# Patient Record
Sex: FEMALE | Race: OTHER | Employment: FULL TIME | URBAN - METROPOLITAN AREA
[De-identification: names, ages, dates, MRNs, and addresses within clinical notes are randomized per-mention and may not be internally consistent; named-entity substitution may affect disease eponyms.]

---

## 2017-06-29 ENCOUNTER — HOSPITAL ENCOUNTER (OUTPATIENT)
Age: 32
Setting detail: SPECIMEN
Discharge: HOME | End: 2017-06-29
Payer: COMMERCIAL

## 2017-06-29 ENCOUNTER — OFFICE VISIT (OUTPATIENT)
Dept: OBGYN CLINIC | Age: 32
End: 2017-06-29
Payer: COMMERCIAL

## 2017-06-29 VITALS
WEIGHT: 229 LBS | RESPIRATION RATE: 18 BRPM | HEART RATE: 75 BPM | SYSTOLIC BLOOD PRESSURE: 125 MMHG | DIASTOLIC BLOOD PRESSURE: 80 MMHG | BODY MASS INDEX: 38.11 KG/M2

## 2017-06-29 DIAGNOSIS — R35.0 FREQUENT URINATION: ICD-10-CM

## 2017-06-29 DIAGNOSIS — Z01.419 WELL WOMAN EXAM: Primary | ICD-10-CM

## 2017-06-29 DIAGNOSIS — K42.9 UMBILICAL HERNIA WITHOUT OBSTRUCTION AND WITHOUT GANGRENE: ICD-10-CM

## 2017-06-29 DIAGNOSIS — Z12.4 CERVICAL CANCER SCREENING: ICD-10-CM

## 2017-06-29 PROCEDURE — 99395 PREV VISIT EST AGE 18-39: CPT | Performed by: SPECIALIST

## 2017-06-29 RX ORDER — NITROFURANTOIN 25; 75 MG/1; MG/1
100 CAPSULE ORAL 2 TIMES DAILY
Qty: 20 CAPSULE | Refills: 0 | Status: SHIPPED | OUTPATIENT
Start: 2017-06-29 | End: 2017-07-09

## 2017-06-29 ASSESSMENT — ENCOUNTER SYMPTOMS
VOMITING: 0
CONSTIPATION: 0
NAUSEA: 0
DIARRHEA: 0
COUGH: 0
APNEA: 0
ABDOMINAL PAIN: 0
ABDOMINAL DISTENTION: 0
EYE PAIN: 0

## 2017-06-30 LAB
CHLAMYDIA BY THIN PREP: NEGATIVE
N. GONORRHOEAE DNA, THIN PREP: NEGATIVE

## 2017-07-05 LAB — CYTOLOGY REPORT: NORMAL

## 2017-10-10 ENCOUNTER — HOSPITAL ENCOUNTER (EMERGENCY)
Age: 32
Discharge: HOME | End: 2017-10-10
Attending: EMERGENCY MEDICINE
Payer: COMMERCIAL

## 2017-10-10 VITALS
HEART RATE: 73 BPM | SYSTOLIC BLOOD PRESSURE: 113 MMHG | WEIGHT: 227.31 LBS | BODY MASS INDEX: 37.87 KG/M2 | HEIGHT: 65 IN | TEMPERATURE: 97.7 F | DIASTOLIC BLOOD PRESSURE: 59 MMHG | OXYGEN SATURATION: 98 % | RESPIRATION RATE: 16 BRPM

## 2017-10-10 DIAGNOSIS — K08.89 PAIN, DENTAL: Primary | ICD-10-CM

## 2017-10-10 PROCEDURE — 99282 EMERGENCY DEPT VISIT SF MDM: CPT

## 2017-10-10 RX ORDER — PENICILLIN V POTASSIUM 500 MG/1
500 TABLET ORAL 4 TIMES DAILY
Qty: 40 TABLET | Refills: 0 | Status: SHIPPED | OUTPATIENT
Start: 2017-10-10 | End: 2017-10-20

## 2017-10-10 RX ORDER — IBUPROFEN 800 MG/1
800 TABLET ORAL EVERY 8 HOURS PRN
Qty: 25 TABLET | Refills: 0 | Status: SHIPPED | OUTPATIENT
Start: 2017-10-10 | End: 2019-08-15 | Stop reason: ALTCHOICE

## 2017-10-10 RX ORDER — TRAMADOL HYDROCHLORIDE 50 MG/1
50 TABLET ORAL EVERY 6 HOURS PRN
Qty: 15 TABLET | Refills: 0 | Status: SHIPPED | OUTPATIENT
Start: 2017-10-10 | End: 2017-10-20

## 2017-10-10 ASSESSMENT — PAIN DESCRIPTION - PAIN TYPE: TYPE: ACUTE PAIN

## 2017-10-10 ASSESSMENT — PAIN SCALES - GENERAL: PAINLEVEL_OUTOF10: 6

## 2017-10-10 ASSESSMENT — PAIN DESCRIPTION - LOCATION: LOCATION: MOUTH

## 2017-10-10 NOTE — ED PROVIDER NOTES
in nurses notes    Allergies:is allergic to morphine. PHYSICAL EXAM     INITIAL VITALS: BP (!) 113/59   Pulse 73   Temp 97.7 °F (36.5 °C) (Oral)   Resp 16   Ht 5' 5\" (1.651 m)   Wt 227 lb 5 oz (103.1 kg)   SpO2 98%   BMI 37.83 kg/m²   CONSTITUTIONAL: Vital signs reviewed, Alert and oriented X 3. HEAD: Atraumatic, Normocephalic. EYES: Eyes are normal to inspection, Pupils equal, round and reactive to light. NECK: Normal ROM, No jugular venous distention, No meningeal signs, Cervical spine nontender. MOUTH:  + dental pain at 29, with no signs of abscess formation or Bassem sign noted. No swelling involving the airway at all. No tongue elevation. No trismus. Lips are normal.  No tooth fractures. RESPIRATORY CHEST: No respiratory distress. ABDOMEN: Abdomen is nontender, No distension. UPPER EXTREMITY: Inspection normal, No cyanosis. NEURO: GCS is 15, Speech normal, Memory normal.   SKIN: Skin is warm, Skin is dry. PSYCHIATRIC: Oriented X 3, Normal affect. EMERGENCY DEPARTMENT COURSE:   Pain meds and antibiotic prescriptions. Pt provided with dental clinic list and instructed to call as soon as possible for an appointment. Instructed to return for worsening or any new symptoms including throat swelling, difficulty swallowing or breathing. Pt agrees. FINAL IMPRESSION:     1.  Pain, dental          DISPOSITION:  DISPOSITION Decision to Discharge      PATIENT REFERRED TO:  Kamla Dudley MD  [address] [address]  [phone]    Call in 1 day      [facility] ED  [place] [address]  [phone]    If symptoms worsen      DISCHARGE MEDICATIONS:  New Prescriptions    IBUPROFEN (ADVIL;MOTRIN) 800 MG TABLET    Take 1 tablet by mouth every 8 hours as needed for Pain    PENICILLIN V POTASSIUM (VEETID) 500 MG TABLET    Take 1 tablet by mouth 4 times daily for 10 days    TRAMADOL (ULTRAM) 50 MG TABLET    Take 1 tablet by mouth every 6 hours as needed for Pain       (Please note that portions of this note were completed with a voice recognition program.  Efforts were made to edit the dictations but occasionally words are mis-transcribed.)    Jonny Vázquez 124, PA-C  10/10/17 1248

## 2017-11-10 ENCOUNTER — HOSPITAL ENCOUNTER (EMERGENCY)
Age: 32
Discharge: HOME | End: 2017-11-10
Attending: EMERGENCY MEDICINE
Payer: COMMERCIAL

## 2017-11-10 VITALS
RESPIRATION RATE: 16 BRPM | HEART RATE: 73 BPM | HEIGHT: 65 IN | WEIGHT: 230 LBS | SYSTOLIC BLOOD PRESSURE: 109 MMHG | TEMPERATURE: 97.8 F | DIASTOLIC BLOOD PRESSURE: 67 MMHG | BODY MASS INDEX: 38.32 KG/M2 | OXYGEN SATURATION: 97 %

## 2017-11-10 DIAGNOSIS — Z20.5 EXPOSURE TO HEPATITIS: Primary | ICD-10-CM

## 2017-11-10 LAB
HAV IGM SER IA-ACNC: NONREACTIVE
HEPATITIS B CORE IGM ANTIBODY: NONREACTIVE
HEPATITIS B SURFACE ANTIGEN: NONREACTIVE
HEPATITIS C ANTIBODY: NONREACTIVE
HIV AG/AB: NONREACTIVE

## 2017-11-10 PROCEDURE — 87389 HIV-1 AG W/HIV-1&-2 AB AG IA: CPT

## 2017-11-10 PROCEDURE — 36415 COLL VENOUS BLD VENIPUNCTURE: CPT

## 2017-11-10 PROCEDURE — 99283 EMERGENCY DEPT VISIT LOW MDM: CPT

## 2017-11-10 PROCEDURE — 80074 ACUTE HEPATITIS PANEL: CPT

## 2017-11-10 NOTE — ED PROVIDER NOTES
headache, numbness or focal weakness. Skin:  Denies rash. Negative in 10 essential Systems except as mentioned above and in the HPI. PAST MEDICAL HISTORY    has a past medical history of Abnormal Pap smear; Blood type A+; Bone disease; Gestational diabetes; Menarche; Parity; and Postpartum depression. SURGICAL HISTORY      has a past surgical history that includes Leg Surgery (Right, 10/2012). CURRENT MEDICATIONS       Current Discharge Medication List      CONTINUE these medications which have NOT CHANGED    Details   ibuprofen (ADVIL;MOTRIN) 800 MG tablet Take 1 tablet by mouth every 8 hours as needed for Pain  Qty: 25 tablet, Refills: 0      Acetaminophen 650 MG TABS Take 650 mg by mouth every 6 hours as needed for Pain  Qty: 30 tablet, Refills: 1             ALLERGIES     is allergic to morphine. FAMILY HISTORY     indicated that the status of her mother is unknown. She indicated that the status of her maternal grandmother is unknown. She indicated that the status of her maternal grandfather is unknown.      family history includes Cancer in her maternal grandfather; Depression in her mother; Diabetes in her mother; Early Death in her maternal grandfather and maternal grandmother; Heart Disease in her mother; High Blood Pressure in her maternal grandfather, maternal grandmother, and mother; Kidney Disease in her mother; Liver Disease in her maternal grandmother and mother; Stroke in her mother; Thyroid Disease in her mother. SOCIAL HISTORY      reports that she has been smoking Cigarettes. She has a 11.00 pack-year smoking history. She has never used smokeless tobacco. She reports that she uses drugs, including Marijuana. She reports that she does not drink alcohol. PHYSICAL EXAM     INITIAL VITALS:  height is 5' 5\" (1.651 m) and weight is 230 lb (104.3 kg). Her oral temperature is 97.8 °F (36.6 °C). Her blood pressure is 109/67 and her pulse is 73.  Her respiration is 16 and SpO2: 97%   Weight: 230 lb (104.3 kg)   Height: 5' 5\" (1.651 m)         CRITICAL CARE:      CONSULTS:  None      PROCEDURES:      FINAL IMPRESSION      1.  Exposure to hepatitis            DISPOSITION/PLAN   DISPOSITION Decision to Discharge        PATIENT REFERRED TO:  Harmeet Duval MD  [place] [phone]    Schedule an appointment as soon as possible for a visit         DISCHARGE MEDICATIONS:  Current Discharge Medication List          (Please note that portions of this note were completed with a voice recognition program.  Efforts were made to edit the dictations but occasionally words are mis-transcribed.)    Marquis Yodit DO  Attending Emergency Physician          Marquis Yodit DO  11/10/17 1021

## 2019-04-25 ENCOUNTER — HOSPITAL ENCOUNTER (OUTPATIENT)
Age: 34
Setting detail: OBSERVATION
Discharge: HOME | End: 2019-04-26
Attending: SURGERY | Admitting: SURGERY
Payer: COMMERCIAL

## 2019-04-25 ENCOUNTER — ANESTHESIA EVENT (OUTPATIENT)
Dept: OPERATING ROOM | Age: 34
End: 2019-04-25
Payer: COMMERCIAL

## 2019-04-25 ENCOUNTER — ANESTHESIA (OUTPATIENT)
Dept: OPERATING ROOM | Age: 34
End: 2019-04-25
Payer: COMMERCIAL

## 2019-04-25 VITALS — TEMPERATURE: 96.4 F | DIASTOLIC BLOOD PRESSURE: 63 MMHG | SYSTOLIC BLOOD PRESSURE: 103 MMHG | OXYGEN SATURATION: 91 %

## 2019-04-25 DIAGNOSIS — G89.18 ACUTE POST-OPERATIVE PAIN: Primary | ICD-10-CM

## 2019-04-25 DIAGNOSIS — Z87.19 S/P HERNIA REPAIR: ICD-10-CM

## 2019-04-25 DIAGNOSIS — Z98.890 S/P HERNIA REPAIR: ICD-10-CM

## 2019-04-25 LAB
-: NORMAL
HCG, PREGNANCY URINE (POC): NEGATIVE

## 2019-04-25 PROCEDURE — 3700000001 HC ADD 15 MINUTES (ANESTHESIA): Performed by: SURGERY

## 2019-04-25 PROCEDURE — 6360000002 HC RX W HCPCS: Performed by: SURGERY

## 2019-04-25 PROCEDURE — C1781 MESH (IMPLANTABLE): HCPCS | Performed by: SURGERY

## 2019-04-25 PROCEDURE — 7100000000 HC PACU RECOVERY - FIRST 15 MIN: Performed by: SURGERY

## 2019-04-25 PROCEDURE — 7100000001 HC PACU RECOVERY - ADDTL 15 MIN: Performed by: SURGERY

## 2019-04-25 PROCEDURE — 6360000002 HC RX W HCPCS: Performed by: NURSE ANESTHETIST, CERTIFIED REGISTERED

## 2019-04-25 PROCEDURE — 2709999900 HC NON-CHARGEABLE SUPPLY: Performed by: SURGERY

## 2019-04-25 PROCEDURE — 3700000000 HC ANESTHESIA ATTENDED CARE: Performed by: SURGERY

## 2019-04-25 PROCEDURE — 88302 TISSUE EXAM BY PATHOLOGIST: CPT

## 2019-04-25 PROCEDURE — 2580000003 HC RX 258: Performed by: ANESTHESIOLOGY

## 2019-04-25 PROCEDURE — 6370000000 HC RX 637 (ALT 250 FOR IP): Performed by: ANESTHESIOLOGY

## 2019-04-25 PROCEDURE — 6360000002 HC RX W HCPCS: Performed by: ANESTHESIOLOGY

## 2019-04-25 PROCEDURE — 3600000002 HC SURGERY LEVEL 2 BASE: Performed by: SURGERY

## 2019-04-25 PROCEDURE — 6370000000 HC RX 637 (ALT 250 FOR IP): Performed by: SURGERY

## 2019-04-25 PROCEDURE — 2500000003 HC RX 250 WO HCPCS: Performed by: SURGERY

## 2019-04-25 PROCEDURE — G0378 HOSPITAL OBSERVATION PER HR: HCPCS

## 2019-04-25 PROCEDURE — 3600000012 HC SURGERY LEVEL 2 ADDTL 15MIN: Performed by: SURGERY

## 2019-04-25 PROCEDURE — 2500000003 HC RX 250 WO HCPCS: Performed by: NURSE ANESTHETIST, CERTIFIED REGISTERED

## 2019-04-25 PROCEDURE — 84703 CHORIONIC GONADOTROPIN ASSAY: CPT

## 2019-04-25 DEVICE — PATCH HERN M DIA2.5IN CIR W/ STRP SEPRA TECHNOLOGY ABSRB: Type: IMPLANTABLE DEVICE | Site: UMBILICAL | Status: FUNCTIONAL

## 2019-04-25 RX ORDER — SODIUM CHLORIDE 0.9 % (FLUSH) 0.9 %
10 SYRINGE (ML) INJECTION PRN
Status: DISCONTINUED | OUTPATIENT
Start: 2019-04-25 | End: 2019-04-26 | Stop reason: HOSPADM

## 2019-04-25 RX ORDER — NEOSTIGMINE METHYLSULFATE 5 MG/5 ML
SYRINGE (ML) INTRAVENOUS PRN
Status: DISCONTINUED | OUTPATIENT
Start: 2019-04-25 | End: 2019-04-25 | Stop reason: SDUPTHER

## 2019-04-25 RX ORDER — DOCUSATE SODIUM 100 MG/1
100 CAPSULE, LIQUID FILLED ORAL 2 TIMES DAILY
Qty: 30 CAPSULE | Refills: 2 | Status: SHIPPED | OUTPATIENT
Start: 2019-04-25 | End: 2019-08-15 | Stop reason: ALTCHOICE

## 2019-04-25 RX ORDER — SODIUM CHLORIDE, SODIUM LACTATE, POTASSIUM CHLORIDE, CALCIUM CHLORIDE 600; 310; 30; 20 MG/100ML; MG/100ML; MG/100ML; MG/100ML
INJECTION, SOLUTION INTRAVENOUS CONTINUOUS
Status: DISCONTINUED | OUTPATIENT
Start: 2019-04-25 | End: 2019-04-25

## 2019-04-25 RX ORDER — M-VIT,TX,IRON,MINS/CALC/FOLIC 27MG-0.4MG
1 TABLET ORAL DAILY
COMMUNITY
End: 2020-09-17

## 2019-04-25 RX ORDER — SENNA PLUS 8.6 MG/1
1 TABLET ORAL DAILY PRN
Status: DISCONTINUED | OUTPATIENT
Start: 2019-04-25 | End: 2019-04-26 | Stop reason: HOSPADM

## 2019-04-25 RX ORDER — OXYCODONE HYDROCHLORIDE AND ACETAMINOPHEN 5; 325 MG/1; MG/1
1 TABLET ORAL EVERY 4 HOURS PRN
Status: DISCONTINUED | OUTPATIENT
Start: 2019-04-25 | End: 2019-04-26 | Stop reason: HOSPADM

## 2019-04-25 RX ORDER — GLYCOPYRROLATE 1 MG/5 ML
SYRINGE (ML) INTRAVENOUS PRN
Status: DISCONTINUED | OUTPATIENT
Start: 2019-04-25 | End: 2019-04-25 | Stop reason: SDUPTHER

## 2019-04-25 RX ORDER — PROMETHAZINE HYDROCHLORIDE 25 MG/ML
6.25 INJECTION, SOLUTION INTRAMUSCULAR; INTRAVENOUS
Status: DISCONTINUED | OUTPATIENT
Start: 2019-04-25 | End: 2019-04-25 | Stop reason: CLARIF

## 2019-04-25 RX ORDER — OXYCODONE HYDROCHLORIDE AND ACETAMINOPHEN 5; 325 MG/1; MG/1
2 TABLET ORAL EVERY 4 HOURS PRN
Status: DISCONTINUED | OUTPATIENT
Start: 2019-04-25 | End: 2019-04-26 | Stop reason: HOSPADM

## 2019-04-25 RX ORDER — MEPERIDINE HYDROCHLORIDE 50 MG/ML
12.5 INJECTION INTRAMUSCULAR; INTRAVENOUS; SUBCUTANEOUS EVERY 5 MIN PRN
Status: DISCONTINUED | OUTPATIENT
Start: 2019-04-25 | End: 2019-04-25 | Stop reason: HOSPADM

## 2019-04-25 RX ORDER — ROCURONIUM BROMIDE 10 MG/ML
INJECTION, SOLUTION INTRAVENOUS PRN
Status: DISCONTINUED | OUTPATIENT
Start: 2019-04-25 | End: 2019-04-25 | Stop reason: SDUPTHER

## 2019-04-25 RX ORDER — MIDAZOLAM HYDROCHLORIDE 1 MG/ML
INJECTION INTRAMUSCULAR; INTRAVENOUS PRN
Status: DISCONTINUED | OUTPATIENT
Start: 2019-04-25 | End: 2019-04-25 | Stop reason: SDUPTHER

## 2019-04-25 RX ORDER — OXYCODONE HYDROCHLORIDE AND ACETAMINOPHEN 5; 325 MG/1; MG/1
2 TABLET ORAL PRN
Status: DISCONTINUED | OUTPATIENT
Start: 2019-04-25 | End: 2019-04-25 | Stop reason: HOSPADM

## 2019-04-25 RX ORDER — SODIUM CHLORIDE 0.9 % (FLUSH) 0.9 %
10 SYRINGE (ML) INJECTION EVERY 12 HOURS SCHEDULED
Status: DISCONTINUED | OUTPATIENT
Start: 2019-04-25 | End: 2019-04-26 | Stop reason: HOSPADM

## 2019-04-25 RX ORDER — LABETALOL 20 MG/4 ML (5 MG/ML) INTRAVENOUS SYRINGE
5 EVERY 10 MIN PRN
Status: DISCONTINUED | OUTPATIENT
Start: 2019-04-25 | End: 2019-04-25 | Stop reason: HOSPADM

## 2019-04-25 RX ORDER — DIPHENHYDRAMINE HYDROCHLORIDE 50 MG/ML
12.5 INJECTION INTRAMUSCULAR; INTRAVENOUS
Status: DISCONTINUED | OUTPATIENT
Start: 2019-04-25 | End: 2019-04-25 | Stop reason: HOSPADM

## 2019-04-25 RX ORDER — CEFAZOLIN SODIUM 1 G/3ML
INJECTION, POWDER, FOR SOLUTION INTRAMUSCULAR; INTRAVENOUS PRN
Status: DISCONTINUED | OUTPATIENT
Start: 2019-04-25 | End: 2019-04-25 | Stop reason: SDUPTHER

## 2019-04-25 RX ORDER — SCOLOPAMINE TRANSDERMAL SYSTEM 1 MG/1
1 PATCH, EXTENDED RELEASE TRANSDERMAL
Status: DISCONTINUED | OUTPATIENT
Start: 2019-04-25 | End: 2019-04-26 | Stop reason: HOSPADM

## 2019-04-25 RX ORDER — DEXAMETHASONE SODIUM PHOSPHATE 4 MG/ML
INJECTION, SOLUTION INTRA-ARTICULAR; INTRALESIONAL; INTRAMUSCULAR; INTRAVENOUS; SOFT TISSUE PRN
Status: DISCONTINUED | OUTPATIENT
Start: 2019-04-25 | End: 2019-04-25 | Stop reason: SDUPTHER

## 2019-04-25 RX ORDER — BUPIVACAINE HYDROCHLORIDE 5 MG/ML
INJECTION, SOLUTION EPIDURAL; INTRACAUDAL PRN
Status: DISCONTINUED | OUTPATIENT
Start: 2019-04-25 | End: 2019-04-25 | Stop reason: ALTCHOICE

## 2019-04-25 RX ORDER — ONDANSETRON 2 MG/ML
INJECTION INTRAMUSCULAR; INTRAVENOUS PRN
Status: DISCONTINUED | OUTPATIENT
Start: 2019-04-25 | End: 2019-04-25 | Stop reason: SDUPTHER

## 2019-04-25 RX ORDER — ONDANSETRON 2 MG/ML
4 INJECTION INTRAMUSCULAR; INTRAVENOUS EVERY 6 HOURS PRN
Status: DISCONTINUED | OUTPATIENT
Start: 2019-04-25 | End: 2019-04-26 | Stop reason: HOSPADM

## 2019-04-25 RX ORDER — PROPOFOL 10 MG/ML
INJECTION, EMULSION INTRAVENOUS PRN
Status: DISCONTINUED | OUTPATIENT
Start: 2019-04-25 | End: 2019-04-25 | Stop reason: SDUPTHER

## 2019-04-25 RX ORDER — FAMOTIDINE 20 MG/1
20 TABLET, FILM COATED ORAL 2 TIMES DAILY
Status: DISCONTINUED | OUTPATIENT
Start: 2019-04-25 | End: 2019-04-26 | Stop reason: HOSPADM

## 2019-04-25 RX ORDER — OXYCODONE HYDROCHLORIDE AND ACETAMINOPHEN 5; 325 MG/1; MG/1
1 TABLET ORAL EVERY 6 HOURS PRN
Qty: 28 TABLET | Refills: 0 | Status: SHIPPED | OUTPATIENT
Start: 2019-04-25 | End: 2019-05-02

## 2019-04-25 RX ORDER — OXYCODONE HYDROCHLORIDE AND ACETAMINOPHEN 5; 325 MG/1; MG/1
1 TABLET ORAL PRN
Status: DISCONTINUED | OUTPATIENT
Start: 2019-04-25 | End: 2019-04-25 | Stop reason: HOSPADM

## 2019-04-25 RX ORDER — FENTANYL CITRATE 50 UG/ML
INJECTION, SOLUTION INTRAMUSCULAR; INTRAVENOUS PRN
Status: DISCONTINUED | OUTPATIENT
Start: 2019-04-25 | End: 2019-04-25 | Stop reason: SDUPTHER

## 2019-04-25 RX ORDER — KETOROLAC TROMETHAMINE 30 MG/ML
INJECTION, SOLUTION INTRAMUSCULAR; INTRAVENOUS PRN
Status: DISCONTINUED | OUTPATIENT
Start: 2019-04-25 | End: 2019-04-25 | Stop reason: SDUPTHER

## 2019-04-25 RX ORDER — DOCUSATE SODIUM 100 MG/1
100 CAPSULE, LIQUID FILLED ORAL 2 TIMES DAILY
Status: DISCONTINUED | OUTPATIENT
Start: 2019-04-25 | End: 2019-04-26 | Stop reason: HOSPADM

## 2019-04-25 RX ORDER — LIDOCAINE HYDROCHLORIDE 10 MG/ML
INJECTION, SOLUTION EPIDURAL; INFILTRATION; INTRACAUDAL; PERINEURAL PRN
Status: DISCONTINUED | OUTPATIENT
Start: 2019-04-25 | End: 2019-04-25 | Stop reason: SDUPTHER

## 2019-04-25 RX ADMIN — Medication 0.4 MG: at 13:09

## 2019-04-25 RX ADMIN — ROCURONIUM BROMIDE 10 MG: 10 INJECTION INTRAVENOUS at 12:30

## 2019-04-25 RX ADMIN — ROCURONIUM BROMIDE 30 MG: 10 INJECTION INTRAVENOUS at 12:06

## 2019-04-25 RX ADMIN — SODIUM CHLORIDE, POTASSIUM CHLORIDE, SODIUM LACTATE AND CALCIUM CHLORIDE: 600; 310; 30; 20 INJECTION, SOLUTION INTRAVENOUS at 09:35

## 2019-04-25 RX ADMIN — KETOROLAC TROMETHAMINE 30 MG: 30 INJECTION, SOLUTION INTRAMUSCULAR at 12:52

## 2019-04-25 RX ADMIN — HYDROMORPHONE HYDROCHLORIDE 0.5 MG: 1 INJECTION, SOLUTION INTRAMUSCULAR; INTRAVENOUS; SUBCUTANEOUS at 13:42

## 2019-04-25 RX ADMIN — OXYCODONE HYDROCHLORIDE AND ACETAMINOPHEN 2 TABLET: 5; 325 TABLET ORAL at 23:12

## 2019-04-25 RX ADMIN — LIDOCAINE HYDROCHLORIDE 50 MG: 10 INJECTION, SOLUTION EPIDURAL; INFILTRATION; INTRACAUDAL; PERINEURAL at 12:05

## 2019-04-25 RX ADMIN — Medication 3 MG: at 13:09

## 2019-04-25 RX ADMIN — OXYCODONE HYDROCHLORIDE AND ACETAMINOPHEN 2 TABLET: 5; 325 TABLET ORAL at 19:03

## 2019-04-25 RX ADMIN — ONDANSETRON 4 MG: 2 INJECTION INTRAMUSCULAR; INTRAVENOUS at 12:52

## 2019-04-25 RX ADMIN — HYDROMORPHONE HYDROCHLORIDE 0.5 MG: 1 INJECTION, SOLUTION INTRAMUSCULAR; INTRAVENOUS; SUBCUTANEOUS at 14:20

## 2019-04-25 RX ADMIN — FAMOTIDINE 20 MG: 20 TABLET ORAL at 20:42

## 2019-04-25 RX ADMIN — MIDAZOLAM 2 MG: 1 INJECTION INTRAMUSCULAR; INTRAVENOUS at 12:02

## 2019-04-25 RX ADMIN — ROCURONIUM BROMIDE 10 MG: 10 INJECTION INTRAVENOUS at 12:14

## 2019-04-25 RX ADMIN — DEXAMETHASONE SODIUM PHOSPHATE 10 MG: 4 INJECTION, SOLUTION INTRA-ARTICULAR; INTRALESIONAL; INTRAMUSCULAR; INTRAVENOUS; SOFT TISSUE at 12:08

## 2019-04-25 RX ADMIN — HYDROMORPHONE HYDROCHLORIDE 0.5 MG: 1 INJECTION, SOLUTION INTRAMUSCULAR; INTRAVENOUS; SUBCUTANEOUS at 13:52

## 2019-04-25 RX ADMIN — CEFAZOLIN 2000 MG: 1 INJECTION, POWDER, FOR SOLUTION INTRAMUSCULAR; INTRAVENOUS at 12:08

## 2019-04-25 RX ADMIN — FENTANYL CITRATE 100 MCG: 50 INJECTION, SOLUTION INTRAMUSCULAR; INTRAVENOUS at 12:05

## 2019-04-25 RX ADMIN — PROPOFOL 200 MG: 10 INJECTION, EMULSION INTRAVENOUS at 12:05

## 2019-04-25 RX ADMIN — SODIUM CHLORIDE, POTASSIUM CHLORIDE, SODIUM LACTATE AND CALCIUM CHLORIDE: 600; 310; 30; 20 INJECTION, SOLUTION INTRAVENOUS at 13:01

## 2019-04-25 RX ADMIN — OXYCODONE HYDROCHLORIDE AND ACETAMINOPHEN 2 TABLET: 5; 325 TABLET ORAL at 15:12

## 2019-04-25 RX ADMIN — DOCUSATE SODIUM 100 MG: 100 CAPSULE, LIQUID FILLED ORAL at 20:42

## 2019-04-25 RX ADMIN — Medication 2 G: at 20:43

## 2019-04-25 RX ADMIN — Medication 2 G: at 12:08

## 2019-04-25 ASSESSMENT — PAIN DESCRIPTION - LOCATION
LOCATION: ARM
LOCATION: ABDOMEN

## 2019-04-25 ASSESSMENT — PULMONARY FUNCTION TESTS
PIF_VALUE: 23
PIF_VALUE: 3
PIF_VALUE: 24
PIF_VALUE: 21
PIF_VALUE: 20
PIF_VALUE: 23
PIF_VALUE: 23
PIF_VALUE: 26
PIF_VALUE: 23
PIF_VALUE: 22
PIF_VALUE: 23
PIF_VALUE: 22
PIF_VALUE: 24
PIF_VALUE: 18
PIF_VALUE: 22
PIF_VALUE: 24
PIF_VALUE: 24
PIF_VALUE: 22
PIF_VALUE: 23
PIF_VALUE: 19
PIF_VALUE: 23
PIF_VALUE: 24
PIF_VALUE: 1
PIF_VALUE: 22
PIF_VALUE: 26
PIF_VALUE: 25
PIF_VALUE: 22
PIF_VALUE: 23
PIF_VALUE: 24
PIF_VALUE: 2
PIF_VALUE: 21
PIF_VALUE: 23
PIF_VALUE: 19
PIF_VALUE: 19
PIF_VALUE: 22
PIF_VALUE: 23
PIF_VALUE: 24
PIF_VALUE: 19
PIF_VALUE: 24
PIF_VALUE: 23
PIF_VALUE: 1
PIF_VALUE: 25
PIF_VALUE: 2
PIF_VALUE: 19
PIF_VALUE: 21
PIF_VALUE: 23
PIF_VALUE: 1
PIF_VALUE: 4
PIF_VALUE: 22
PIF_VALUE: 24
PIF_VALUE: 23
PIF_VALUE: 19
PIF_VALUE: 3
PIF_VALUE: 21
PIF_VALUE: 23
PIF_VALUE: 22
PIF_VALUE: 23
PIF_VALUE: 19
PIF_VALUE: 23
PIF_VALUE: 22
PIF_VALUE: 23
PIF_VALUE: 27
PIF_VALUE: 21
PIF_VALUE: 21
PIF_VALUE: 22
PIF_VALUE: 24
PIF_VALUE: 21
PIF_VALUE: 23
PIF_VALUE: 3
PIF_VALUE: 20
PIF_VALUE: 25
PIF_VALUE: 23

## 2019-04-25 ASSESSMENT — PAIN SCALES - GENERAL
PAINLEVEL_OUTOF10: 8
PAINLEVEL_OUTOF10: 7
PAINLEVEL_OUTOF10: 0
PAINLEVEL_OUTOF10: 0
PAINLEVEL_OUTOF10: 8
PAINLEVEL_OUTOF10: 9
PAINLEVEL_OUTOF10: 4
PAINLEVEL_OUTOF10: 9
PAINLEVEL_OUTOF10: 8

## 2019-04-25 ASSESSMENT — PAIN DESCRIPTION - PAIN TYPE
TYPE: SURGICAL PAIN

## 2019-04-25 ASSESSMENT — PAIN - FUNCTIONAL ASSESSMENT: PAIN_FUNCTIONAL_ASSESSMENT: 0-10

## 2019-04-25 ASSESSMENT — LIFESTYLE VARIABLES: SMOKING_STATUS: 1

## 2019-04-25 NOTE — ANESTHESIA PRE PROCEDURE
Department of Anesthesiology  Preprocedure Note       Name:  Davide Quach   Age:  29 y.o.  :  1985                                          MRN:  [id number]         Date:  2019      Surgeon: Fuentes Arceo):  Manuela Ricardo MD    Procedure: HERNIA UMBILICAL REPAIR W/MESH (N/A )    Medications prior to admission:   Prior to Admission medications    Medication Sig Start Date End Date Taking? Authorizing Provider   ibuprofen (ADVIL;MOTRIN) 800 MG tablet Take 1 tablet by mouth every 8 hours as needed for Pain 10/10/17   Rachel Voss PA-C   Acetaminophen 650 MG TABS Take 650 mg by mouth every 6 hours as needed for Pain 9/1/15   Effie Muhmamad DO       Current medications:    Current Facility-Administered Medications   Medication Dose Route Frequency Provider Last Rate Last Dose    lactated ringers infusion   Intravenous Continuous Arvin Soliman MD           Allergies:     Allergies   Allergen Reactions    Morphine Other (See Comments)     Muscle spasms in eyes - bilateral       Problem List:    Patient Active Problem List   Diagnosis Code    Right hip pain M25.551    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    Amenorrhea N91.2    Encounter for supervision of other normal pregnancy J22.27    Umbilical hernia Q71.4    Morbid obesity with BMI of 40.0-44.9, adult ([facility] E66.01, Z68.41     , M apg , wt 8#8 O80       Past Medical History:        Diagnosis Date    Abnormal Pap smear     LSIL    Blood type A+     Bone disease     fibrousdysplasia    Gestational diabetes     2 out of 3 preg== 1st-diet control----2nd-insulin/diet    Menarche @12y/o    Parity       --  vag del -- ALVERTO 9/10/2013    Postpartum depression 2011    Dx pp depression, put on Prozac       Past Surgical History:        Procedure Laterality Date    LEG SURGERY Right 10/2012    right leg d/t fibrousdysplaia       Social History:    Social History     Tobacco Use    Smoking status: Current Every Day Smoker     Packs/day: 1.00     Years: 11.00     Pack years: 11.00     Types: Cigarettes    Smokeless tobacco: Never Used   Substance Use Topics    Alcohol use: No                                Ready to quit: Not Answered  Counseling given: Not Answered      Vital Signs (Current): There were no vitals filed for this visit. BP Readings from Last 3 Encounters:   11/10/17 109/67   10/10/17 (!) 113/59   06/29/17 125/80       NPO Status:                                                                                 BMI:   Wt Readings from Last 3 Encounters:   11/10/17 230 lb (104.3 kg)   10/10/17 227 lb 5 oz (103.1 kg)   06/29/17 229 lb (103.9 kg)     There is no height or weight on file to calculate BMI.    CBC:   Lab Results   Component Value Date    WBC 8.3 09/01/2015    RBC 3.96 09/01/2015    RBC 3.76 06/05/2012    HGB 12.4 09/02/2015    HCT 36.9 09/02/2015    MCV 95.9 09/01/2015    RDW 13.0 09/01/2015     09/01/2015     06/05/2012       CMP:   Lab Results   Component Value Date     10/05/2013    K 4.3 10/05/2013     10/05/2013    CO2 24 10/05/2013    BUN 12 10/05/2013    CREATININE 0.56 10/05/2013    GFRAA >60 10/05/2013    LABGLOM >60 10/05/2013    GLUCOSE 78 06/18/2015    GLUCOSE 88 10/05/2013    GLUCOSE 76 01/11/2012    CALCIUM 8.6 06/23/2012    BILITOT 0.34 06/23/2012    ALKPHOS 95 06/23/2012    AST 21 06/23/2012    ALT 23 06/23/2012       POC Tests: No results for input(s): POCGLU, POCNA, POCK, POCCL, POCBUN, POCHEMO, POCHCT in the last 72 hours.     Coags: No results found for: PROTIME, INR, APTT    HCG (If Applicable):   Lab Results   Component Value Date    PREGTESTUR POSITIVE (A) 12/24/2014    HCG NEGATIVE 10/22/2012    HCGQUANT 39,224 (H) 01/24/2013        ABGs: No results found for: PHART, PO2ART, RKM8WLQ, WTD3NBE, BEART, F1ENQUOO     Type & Screen (If Applicable):  No results found for: [facility]    Anesthesia Evaluation  Patient summary reviewed and Nursing notes reviewed no history of anesthetic complications:   Airway: Mallampati: I  TM distance: >3 FB   Neck ROM: full  Mouth opening: > = 3 FB Dental: normal exam         Pulmonary:normal exam    (+) current smoker                           Cardiovascular:Negative CV ROS                      Neuro/Psych:   Negative Neuro/Psych ROS              GI/Hepatic/Renal:   (+) morbid obesity          Endo/Other: Negative Endo/Other ROS                    Abdominal:           Vascular:                                        Anesthesia Plan      general     ASA 2       Induction: intravenous. MIPS: Postoperative opioids intended and Prophylactic antiemetics administered. Anesthetic plan and risks discussed with patient. Plan discussed with CRNA.                   Hemant Londono MD   4/25/2019

## 2019-04-25 NOTE — H&P
HISTORY and [address]47         NAME:  Jacinta Hinkle  MRN: [id number]   YOB: 1985   Date: 2019   Age: 29 y.o. Gender: female       COMPLAINT AND PRESENT HISTORY:      Fausto Caceres is a 29 yr old female who is having an umbilical hernia repaired with mesh. She has been having pain in the area., Hernia has been present for about 3-4 yrs since she had a baby, Pain increased over time, She has no change in bowel habits or blood in stool. She says she has good health Denies chronic diseases. Eating and sleeping well, No recent illnesses    DIAGNOSTIC RESULTS   Radiology:     EKG:   Labs:    U/A:      PAST MEDICAL HISTORY     Past Medical History:   Diagnosis Date    Abnormal Pap smear     LSIL    Blood type A+     Bone disease     fibrousdysplasia    Gestational diabetes     2 out of 3 preg== 1st-diet control----2nd-insulin/diet    Menarche @12y/o    Parity       --  vag del -- ALVERTO 9/10/2013    Postpartum depression 2011    Dx pp depression, put on Prozac       Pt denies any history of Diabetes mellitus type 2, stroke, heart disease, COPD, Asthma, GERD, HLD, Cancer, Seizures,Thyroid disease, Kidney Disease, Hepatitis, TB, Psychiatric Disorders or Substance abuse.     SURGICAL HISTORY       Past Surgical History:   Procedure Laterality Date    DILATION AND CURETTAGE OF UTERUS      LEG SURGERY Right 10/2012    right leg d/t fibrousdysplaia       FAMILY HISTORY       Family History   Problem Relation Age of Onset    Diabetes Mother     High Blood Pressure Mother     Heart Disease Mother         stents    Kidney Disease Mother         d/t diabetes    Depression Mother     Stroke Mother     Liver Disease Mother     Thyroid Disease Mother     Early Death Maternal Grandmother     High Blood Pressure Maternal Grandmother     Liver Disease Maternal Grandmother     Early Death Maternal Grandfather     High Blood Pressure Maternal Grandfather  Cancer Maternal Grandfather         colon       SOCIAL HISTORY       Social History     Socioeconomic History    Marital status: Single     Spouse name: None    Number of children: None    Years of education: None    Highest education level: None   Occupational History    None   Social Needs    Financial resource strain: None    Food insecurity:     Worry: None     Inability: None    Transportation needs:     Medical: None     Non-medical: None   Tobacco Use    Smoking status: Current Every Day Smoker     Packs/day: 1.00     Years: 11.00     Pack years: 11.00     Types: Cigarettes    Smokeless tobacco: Never Used   Substance and Sexual Activity    Alcohol use: No    Drug use: No     Types: Marijuana     Comment: History of    Sexual activity: Yes     Partners: Male   Lifestyle    Physical activity:     Days per week: None     Minutes per session: None    Stress: None   Relationships    Social connections:     Talks on phone: None     Gets together: None     Attends Caodaism service: None     Active member of club or organization: None     Attends meetings of clubs or organizations: None     Relationship status: None    Intimate partner violence:     Fear of current or ex partner: None     Emotionally abused: None     Physically abused: None     Forced sexual activity: None   Other Topics Concern    None   Social History Narrative    None           REVIEW OF SYSTEMS      Allergies   Allergen Reactions    Morphine Other (See Comments)     Muscle spasms in eyes - bilateral       No current facility-administered medications on file prior to encounter.       Current Outpatient Medications on File Prior to Encounter   Medication Sig Dispense Refill    Multiple Vitamins-Minerals (THERAPEUTIC MULTIVITAMIN-MINERALS) tablet Take 1 tablet by mouth daily      Acetaminophen 650 MG TABS Take 650 mg by mouth every 6 hours as needed for Pain 30 tablet 1    ibuprofen (ADVIL;MOTRIN) 800 MG tablet Take 1 tablet by mouth every 8 hours as needed for Pain 25 tablet 0                      General health:  Patient states health is overall very good, No fevers or infections recently, She feels well and has good energy                  Skin:  No  Skin lesions. .    Head, eyes, ears, nose, throat:  Does not get headaches, No sore throats or sinus problems,      Neck:  No pain, stiffness or masses. Cardiovascular/Respiratory system:  Has no chest pressure,   Denies CAD, Denies lung problems and no cough              Gastrointestinal tract: Has umbilical hernia and has pain on palpation  No change in bowel habits,  LMP 1 week  G 2 P 2  Hernia formed shortly after the birth of her last child in 2015    Genitourinary:  Currently has no UTI sx    Locomotor:  No bone or joint pains. No swelling or deformities. Neuropsychiatric:  No referable complaints. See HPI. Umbilical hernia     GENERAL PHYSICAL EXAM:         Vitals: BP 94/61   Pulse 62   Temp 97.3 °F (36.3 °C) (Oral)   Resp 18   Ht 5' 6\" (1.676 m)   Wt 230 lb (104.3 kg)   SpO2 97%   BMI 37.12 kg/m²  Body mass index is 37.12 kg/m². GENERAL APPEARANCE:  Dano Hunter is 29 y.o.,  female, moderately obese,  Physical Exam   Constitutional: She appears well-developed and well-nourished. HENT:   Head: Normocephalic. Right Ear: External ear normal.   Left Ear: External ear normal.   Nose: Nose normal.   Mouth/Throat: Oropharynx is clear and moist.   Eyes: Pupils are equal, round, and reactive to light. Neck: Normal range of motion. No JVD present. No thyromegaly present. Cardiovascular: Normal rate, normal heart sounds and intact distal pulses. No murmur heard. Pulmonary/Chest: Effort normal. She has no wheezes. Abdominal: Soft. Bowel sounds are normal.   Umbilical hernia is present. Approx 6 cm oval, Non tender to palpation. Slighly below and left of umbilicus   Musculoskeletal: Normal range of motion.  She exhibits no edema or deformity. Neurological: She is alert. She displays normal reflexes. No cranial nerve deficit. Skin: Skin is warm. Capillary refill takes less than 2 seconds. Psychiatric: She has a normal mood and affect. Her behavior is normal. Thought content normal.         PROVISIONAL DIAGNOSES:        Umbilical hernia   For repair with mesh  Active Problems:    * No active hospital problems. *  Resolved Problems:    * No resolved hospital problems.  JUSTIN Villanueva - CNP on 4/25/2019 at 9:39 AM

## 2019-04-25 NOTE — OP NOTE
OPERATIVE NOTE    DATE OF PROCEDURE: 4/25/2019     Jimbo Whelan MD    ASSISTANT: none    PREOPERATIVE DIAGNOSIS : large umbilical  hernia    POSTOPERATIVE DIAGNOSIS: Same    OPERATION: umbilical hernia repair with mesh    ANESTHESIA: General anesthesia    ESTIMATED BLOOD LOSS:  53MF    COMPLICATIONS: None. SPECIMENS:  Was Obtained: hernia sac    HISTORY: The patient is a 29y.o. year old female with history of above preop diagnosis. I explained the risk, benefits, expected outcome, and alternatives to the procedure. Patient understands and is in agreement. PROCEDURE: The patient was given general anesthesia. The abdomen was prepped and draped in typical sterile fashion. Incision is made in the midline at the umbilicus and dissection is taken down with electro bovie cautery to the hernia sac. The hernia sac is opened and removed and sent for specimen. The adhesions are taken down sharply. The ventralex ST with strap circular mesh medium is dipped in saline and inserted into the peritoneal space. The mesh is sutured into place with interrupted O-PDS suture. The fascia is closed over the mesh with 0-Vicryl in figure of 8 sutures. The thin umbilical skin is ischemic and had to be removed. The scarpas layer is closed with 3-0 vicryl in interrupted suture. The dermis is closed with 4-0 Vicryl in interrupted fashion, and the skin is closed with 4-0 Monocryl in running fashion. The patient surgical glue is applied to the skin and the patient is awakened and extubated and taken to the recovery room in stable condition.     Electronically signed by Rosalva Cole MD on 4/25/2019 at 1:12 PM

## 2019-04-25 NOTE — ANESTHESIA POSTPROCEDURE EVALUATION
Department of Anesthesiology  Postprocedure Note    Patient: Josseline Trejo  MRN: [id number]  YOB: 1985  Date of evaluation: 4/25/2019  Time:  3:08 PM     Procedure Summary     Date:  04/25/19 Room / Location:  [address] [place] [address]    Anesthesia Start:  1201 Anesthesia Stop:  6557    Procedure: HERNIA UMBILICAL REPAIR W/MESH (N/A Abdomen) Diagnosis:  (UMBILICAL HERNIA    PAT ON ADMIT PER ANES)    Surgeon:  Chika Morales MD Responsible Provider:  Tabitha Oneill MD    Anesthesia Type:  general ASA Status:  2          Anesthesia Type: general    Ambrocio Phase I: Ambrocio Score: 8    Ambrocio Phase II:      Last vitals: Reviewed and per EMR flowsheets.        Anesthesia Post Evaluation    Comments: POST- ANESTHESIA EVALUATION       Pt Name: Josseline Trejo  MRN: [id number]  [place]: 1985  Date of evaluation: 4/25/2019  Time:  3:08 PM      BP (!) 109/59   Pulse 58   Temp 98.5 °F (36.9 °C) (Oral)   Resp 20   Ht 5' 6\" (1.676 m)   Wt 230 lb (104.3 kg)   SpO2 99%   BMI 37.12 kg/m²      Consciousness Level  Awake  Cardiopulmonary Status  Stable  Pain Adequately Treated YES  Nausea / Vomiting  NO  Adequate Hydration  YES  Anesthesia Related Complications NONE      Electronically signed by Tabitha Oneill MD on 4/25/2019 at 3:08 PM

## 2019-04-25 NOTE — CARE COORDINATION
[address]  DVT Prophylaxis and Vaccine Status  Work List  Mandatory for all patients      Patient must be on both Chemical prophylaxis and Mechanical prophylaxis.  If chemical/mechanical prophylaxis is not ordered, the physician must document a reason for not using prophylaxis     Chemical Prophylaxis  Is patient on chemical prophylaxis: No  If no chemical prophylaxis Is a order in for No Chemical VTE prophylaxisYes  If no was the physician notified not applicable      Mechanical Prophylaxis  Is patient on mechanical prophylaxis, intermittent pneumatic compression device: Yes  If no was the physician notified not applicable        Pneumonia Vaccine  Vaccine indicated:  Not indicated  If indicated was the vaccine given: not applicable    Influenza Vaccine (applicable from October through March):  Vaccine indicated: Up-to-date  If indicated was the vaccine given: yes    Patient Education  Education completed on DVT prophylaxis: yes

## 2019-04-26 VITALS
DIASTOLIC BLOOD PRESSURE: 60 MMHG | TEMPERATURE: 98.1 F | WEIGHT: 230 LBS | HEART RATE: 58 BPM | OXYGEN SATURATION: 96 % | BODY MASS INDEX: 53.23 KG/M2 | RESPIRATION RATE: 20 BRPM | HEIGHT: 55 IN | SYSTOLIC BLOOD PRESSURE: 100 MMHG

## 2019-04-26 LAB
ANION GAP SERPL CALCULATED.3IONS-SCNC: 12 MMOL/L (ref 9–17)
BUN BLDV-MCNC: 8 MG/DL (ref 6–20)
BUN/CREAT BLD: ABNORMAL (ref 9–20)
CALCIUM SERPL-MCNC: 8.4 MG/DL (ref 8.6–10.4)
CHLORIDE BLD-SCNC: 104 MMOL/L (ref 98–107)
CO2: 22 MMOL/L (ref 20–31)
CREAT SERPL-MCNC: 0.49 MG/DL (ref 0.5–0.9)
GFR AFRICAN AMERICAN: >60 ML/MIN
GFR NON-AFRICAN AMERICAN: >60 ML/MIN
GFR SERPL CREATININE-BSD FRML MDRD: ABNORMAL ML/MIN/{1.73_M2}
GFR SERPL CREATININE-BSD FRML MDRD: ABNORMAL ML/MIN/{1.73_M2}
GLUCOSE BLD-MCNC: 142 MG/DL (ref 70–99)
HCT VFR BLD CALC: 39.3 % (ref 36–46)
HEMOGLOBIN: 13.2 G/DL (ref 12–16)
MCH RBC QN AUTO: 32.1 PG (ref 26–34)
MCHC RBC AUTO-ENTMCNC: 33.6 G/DL (ref 31–37)
MCV RBC AUTO: 95.5 FL (ref 80–100)
NRBC AUTOMATED: ABNORMAL PER 100 WBC
PDW BLD-RTO: 13.1 % (ref 11.5–14.9)
PLATELET # BLD: 229 K/UL (ref 150–450)
PMV BLD AUTO: 7.4 FL (ref 6–12)
POTASSIUM SERPL-SCNC: 4.1 MMOL/L (ref 3.7–5.3)
RBC # BLD: 4.11 M/UL (ref 4–5.2)
SODIUM BLD-SCNC: 138 MMOL/L (ref 135–144)
SURGICAL PATHOLOGY REPORT: NORMAL
WBC # BLD: 14.5 K/UL (ref 3.5–11)

## 2019-04-26 PROCEDURE — 85027 COMPLETE CBC AUTOMATED: CPT

## 2019-04-26 PROCEDURE — G0378 HOSPITAL OBSERVATION PER HR: HCPCS

## 2019-04-26 PROCEDURE — 36415 COLL VENOUS BLD VENIPUNCTURE: CPT

## 2019-04-26 PROCEDURE — 96372 THER/PROPH/DIAG INJ SC/IM: CPT

## 2019-04-26 PROCEDURE — 80048 BASIC METABOLIC PNL TOTAL CA: CPT

## 2019-04-26 PROCEDURE — 6360000002 HC RX W HCPCS: Performed by: SURGERY

## 2019-04-26 PROCEDURE — 6370000000 HC RX 637 (ALT 250 FOR IP): Performed by: SURGERY

## 2019-04-26 RX ADMIN — FAMOTIDINE 20 MG: 20 TABLET ORAL at 07:47

## 2019-04-26 RX ADMIN — Medication 2 G: at 03:24

## 2019-04-26 RX ADMIN — ENOXAPARIN SODIUM 40 MG: 100 INJECTION SUBCUTANEOUS at 07:47

## 2019-04-26 RX ADMIN — DOCUSATE SODIUM 100 MG: 100 CAPSULE, LIQUID FILLED ORAL at 07:47

## 2019-04-26 RX ADMIN — OXYCODONE HYDROCHLORIDE AND ACETAMINOPHEN 2 TABLET: 5; 325 TABLET ORAL at 03:24

## 2019-04-26 RX ADMIN — OXYCODONE HYDROCHLORIDE AND ACETAMINOPHEN 2 TABLET: 5; 325 TABLET ORAL at 07:47

## 2019-04-26 ASSESSMENT — PAIN SCALES - GENERAL
PAINLEVEL_OUTOF10: 0
PAINLEVEL_OUTOF10: 9
PAINLEVEL_OUTOF10: 8

## 2019-04-26 NOTE — PLAN OF CARE
Problem: Pain:  Description  Pain management should include both nonpharmacologic and pharmacologic interventions. Goal: Control of acute pain  Description  Control of acute pain  4/26/2019 1039 by Tyrone Hackett RN  Outcome: Met This Shift  Note:   Adequate pain control maintained this shift. Medicated per PRN orders. See MAR. Problem: Skin Integrity:  Goal: Will show no infection signs and symptoms  Description  Will show no infection signs and symptoms  4/26/2019 1039 by Tyrone Hackett RN  Outcome: Met This Shift  Note:   Patient remains afebrile this shift. Incision is clean, dry and intact. No redness or drainage noted.

## 2019-04-26 NOTE — ANESTHESIA POSTPROCEDURE EVALUATION
Department of Anesthesiology  Postprocedure Note    Patient: Dedra Hardy  MRN: [id number]  YOB: 1985  Date of evaluation: 4/26/2019  Time:  9:04 AM     Procedure Summary     Date:  04/25/19 Room / Location:  [address] [place] [address] / [address] [place] [address]    Anesthesia Start:  6508 Anesthesia Stop:  363    Procedure: HERNIA UMBILICAL REPAIR W/MESH (N/A Abdomen) Diagnosis:  (UMBILICAL HERNIA    PAT ON ADMIT PER ANES)    Surgeon:  Gladys Sanchez MD Responsible Provider:  Kyle Chaudhary MD    Anesthesia Type:  general ASA Status:  2          Anesthesia Type: general    Ambrocio Phase I: Ambrocio Score: 8    Ambrocio Phase II:      Last vitals: Reviewed and per EMR flowsheets. Anesthesia Post Evaluation    Comments: Patient about to go home voices no complaints.

## 2019-04-26 NOTE — PLAN OF CARE
Problem: Pain:  Description  Pain management should include both nonpharmacologic and pharmacologic interventions. Goal: Pain level will decrease  Description  Pain level will decrease  4/26/2019 1113 by Мария Soni RN  Outcome: Completed     Problem: Pain:  Description  Pain management should include both nonpharmacologic and pharmacologic interventions.   Goal: Control of chronic pain  Description  Control of chronic pain  4/26/2019 1113 by Мария Soni RN  Outcome: Completed     Problem: Skin Integrity:  Goal: Will show no infection signs and symptoms  Description  Will show no infection signs and symptoms  4/26/2019 1113 by Мария Soni RN  Outcome: Completed     Problem: Skin Integrity:  Goal: Absence of new skin breakdown  Description  Absence of new skin breakdown  4/26/2019 1113 by Мария Soni RN  Outcome: Completed

## 2019-04-26 NOTE — PROGRESS NOTES
Surgery Progress Note              POD # 1    PATIENT NAME: Herbie Gilliland     TODAY'S DATE: 4/26/2019, 7:05 AM    Chief complaint: s/p hernia repair with mesh    SUBJECTIVE:    Pt is feeling well, tolerating regular diet. Patients pain is well controlled. Pt is  passing gas. Pt has had a bowel movement. OBJECTIVE:   VITALS:  /60   Pulse 58   Temp 98.1 °F (36.7 °C) (Oral)   Resp 20   Ht (!) 5.6\" (0.142 m)   Wt 230 lb (104.3 kg)   SpO2 96%   BMI 5156.49 kg/m²     INTAKE/OUTPUT:      Intake/Output Summary (Last 24 hours) at 4/26/2019 0705  Last data filed at 4/26/2019 8954  Gross per 24 hour   Intake 1500 ml   Output 610 ml   Net 890 ml                 CONSTITUTIONAL:  awake and alert.   No acute distress  CARDIOVASCULAR:  regular rate and rhythm and No Murmur  LUNGS:  CTA Bilaterally  ABDOMEN:   Abdomen soft, non-tender, non-distended  INCISION: Incision clean/dry/intact    Data:  CBC:   Lab Results   Component Value Date    WBC 8.3 09/01/2015    RBC 3.96 09/01/2015    RBC 3.76 06/05/2012    HGB 12.4 09/02/2015    HCT 36.9 09/02/2015    MCV 95.9 09/01/2015    MCH 32.6 09/01/2015    MCHC 34.0 09/01/2015    RDW 13.0 09/01/2015     09/01/2015     06/05/2012    MPV 9.2 09/01/2015     CMP:    Lab Results   Component Value Date     10/05/2013    K 4.3 10/05/2013     10/05/2013    CO2 24 10/05/2013    BUN 12 10/05/2013    CREATININE 0.56 10/05/2013    GFRAA >60 10/05/2013    LABGLOM >60 10/05/2013    GLUCOSE 78 06/18/2015    GLUCOSE 88 10/05/2013    GLUCOSE 76 01/11/2012    LABALBU 4.0 06/23/2012    CALCIUM 8.6 06/23/2012    BILITOT 0.34 06/23/2012    ALKPHOS 95 06/23/2012    AST 21 06/23/2012    ALT 23 06/23/2012           ASSESSMENT   29 y.o. female Patient Active Hospital Problem List:    Patient Active Problem List   Diagnosis    Right hip pain    ABC (aneurysmal bone cyst)    Amenorrhea    Encounter for supervision of other normal pregnancy    Umbilical hernia    Morbid obesity with BMI of 40.0-44.9, adult ([facility] [address]     , M apg , wt 8#8    S/P hernia repair             Plan  1. GI prophylaxis proton pump inhibitor per orders, pharmacologic prophylaxis (with any of the following: enoxaparin (Lovenox) 40mg SQ 2h prior to surgery then QD) and intermittent pneumatic compression boots  2. normal diet as tolerated  3.  Discharge home    Electronically signed by Debbie Mendoza MD  on 2019 at 7:05 AM

## 2019-04-26 NOTE — DISCHARGE INSTR - ACTIVITY
Activity: no heavy lifting, pushing, pulling for 6 weeks, no driving for 2 weeks or while on analgesics

## 2019-04-26 NOTE — PLAN OF CARE
Problem: Pain:  Description  Pain management should include both nonpharmacologic and pharmacologic interventions.   Goal: Pain level will decrease  Description  Pain level will decrease  4/26/2019 0625 by Kavitha Crook RN  Outcome: Ongoing     Problem: Skin Integrity:  Goal: Will show no infection signs and symptoms  Description  Will show no infection signs and symptoms  4/26/2019 0625 by Kavitha Crook RN  Outcome: Ongoing

## 2019-07-30 ENCOUNTER — APPOINTMENT (OUTPATIENT)
Dept: ULTRASOUND IMAGING | Age: 34
End: 2019-07-30
Payer: COMMERCIAL

## 2019-07-30 ENCOUNTER — APPOINTMENT (OUTPATIENT)
Dept: CT IMAGING | Age: 34
End: 2019-07-30
Payer: COMMERCIAL

## 2019-07-30 ENCOUNTER — HOSPITAL ENCOUNTER (EMERGENCY)
Age: 34
Discharge: HOME | End: 2019-07-30
Attending: EMERGENCY MEDICINE
Payer: COMMERCIAL

## 2019-07-30 VITALS
RESPIRATION RATE: 16 BRPM | DIASTOLIC BLOOD PRESSURE: 93 MMHG | HEIGHT: 65 IN | WEIGHT: 226 LBS | SYSTOLIC BLOOD PRESSURE: 137 MMHG | BODY MASS INDEX: 37.65 KG/M2 | OXYGEN SATURATION: 98 % | HEART RATE: 74 BPM | TEMPERATURE: 98.3 F

## 2019-07-30 DIAGNOSIS — R10.9 ABDOMINAL PAIN, UNSPECIFIED ABDOMINAL LOCATION: Primary | ICD-10-CM

## 2019-07-30 LAB
ABSOLUTE EOS #: 0.2 K/UL (ref 0–0.4)
ABSOLUTE IMMATURE GRANULOCYTE: NORMAL K/UL (ref 0–0.3)
ABSOLUTE LYMPH #: 2.3 K/UL (ref 1–4.8)
ABSOLUTE MONO #: 0.5 K/UL (ref 0.1–1.3)
ALBUMIN SERPL-MCNC: 4.1 G/DL (ref 3.5–5.2)
ALBUMIN/GLOBULIN RATIO: ABNORMAL (ref 1–2.5)
ALP BLD-CCNC: 56 U/L (ref 35–104)
ALT SERPL-CCNC: 13 U/L (ref 5–33)
ANION GAP SERPL CALCULATED.3IONS-SCNC: 12 MMOL/L (ref 9–17)
AST SERPL-CCNC: 13 U/L
BASOPHILS # BLD: 1 % (ref 0–2)
BASOPHILS ABSOLUTE: 0 K/UL (ref 0–0.2)
BILIRUB SERPL-MCNC: 0.18 MG/DL (ref 0.3–1.2)
BILIRUBIN URINE: NEGATIVE
BUN BLDV-MCNC: 12 MG/DL (ref 6–20)
BUN/CREAT BLD: ABNORMAL (ref 9–20)
CALCIUM SERPL-MCNC: 8.9 MG/DL (ref 8.6–10.4)
CHLORIDE BLD-SCNC: 105 MMOL/L (ref 98–107)
CO2: 20 MMOL/L (ref 20–31)
COLOR: YELLOW
COMMENT UA: NORMAL
CREAT SERPL-MCNC: 0.48 MG/DL (ref 0.5–0.9)
DIFFERENTIAL TYPE: NORMAL
EOSINOPHILS RELATIVE PERCENT: 2 % (ref 0–4)
GFR AFRICAN AMERICAN: >60 ML/MIN
GFR NON-AFRICAN AMERICAN: >60 ML/MIN
GFR SERPL CREATININE-BSD FRML MDRD: ABNORMAL ML/MIN/{1.73_M2}
GFR SERPL CREATININE-BSD FRML MDRD: ABNORMAL ML/MIN/{1.73_M2}
GLUCOSE BLD-MCNC: 93 MG/DL (ref 70–99)
GLUCOSE URINE: NEGATIVE
HCG(URINE) PREGNANCY TEST: NEGATIVE
HCT VFR BLD CALC: 41.1 % (ref 36–46)
HEMOGLOBIN: 13.9 G/DL (ref 12–16)
IMMATURE GRANULOCYTES: NORMAL %
KETONES, URINE: NEGATIVE
LACTIC ACID: 0.5 MMOL/L (ref 0.5–2.2)
LEUKOCYTE ESTERASE, URINE: NEGATIVE
LYMPHOCYTES # BLD: 30 % (ref 24–44)
MCH RBC QN AUTO: 32.1 PG (ref 26–34)
MCHC RBC AUTO-ENTMCNC: 33.7 G/DL (ref 31–37)
MCV RBC AUTO: 95.2 FL (ref 80–100)
MONOCYTES # BLD: 6 % (ref 1–7)
NITRITE, URINE: NEGATIVE
NRBC AUTOMATED: NORMAL PER 100 WBC
PDW BLD-RTO: 14.2 % (ref 11.5–14.9)
PH UA: 5 (ref 5–8)
PLATELET # BLD: 222 K/UL (ref 150–450)
PLATELET ESTIMATE: NORMAL
PMV BLD AUTO: 8 FL (ref 6–12)
POTASSIUM SERPL-SCNC: 4 MMOL/L (ref 3.7–5.3)
PROTEIN UA: NEGATIVE
RBC # BLD: 4.32 M/UL (ref 4–5.2)
RBC # BLD: NORMAL 10*6/UL
SEG NEUTROPHILS: 61 % (ref 36–66)
SEGMENTED NEUTROPHILS ABSOLUTE COUNT: 4.8 K/UL (ref 1.3–9.1)
SODIUM BLD-SCNC: 137 MMOL/L (ref 135–144)
SPECIFIC GRAVITY UA: 1.03 (ref 1–1.03)
TOTAL PROTEIN: 6.9 G/DL (ref 6.4–8.3)
TURBIDITY: CLEAR
URINE HGB: NEGATIVE
UROBILINOGEN, URINE: NORMAL
WBC # BLD: 7.8 K/UL (ref 3.5–11)
WBC # BLD: NORMAL 10*3/UL

## 2019-07-30 PROCEDURE — 99284 EMERGENCY DEPT VISIT MOD MDM: CPT

## 2019-07-30 PROCEDURE — 80053 COMPREHEN METABOLIC PANEL: CPT

## 2019-07-30 PROCEDURE — 81025 URINE PREGNANCY TEST: CPT

## 2019-07-30 PROCEDURE — 85025 COMPLETE CBC W/AUTO DIFF WBC: CPT

## 2019-07-30 PROCEDURE — 36415 COLL VENOUS BLD VENIPUNCTURE: CPT

## 2019-07-30 PROCEDURE — 76830 TRANSVAGINAL US NON-OB: CPT

## 2019-07-30 PROCEDURE — 81003 URINALYSIS AUTO W/O SCOPE: CPT

## 2019-07-30 PROCEDURE — 2580000003 HC RX 258: Performed by: EMERGENCY MEDICINE

## 2019-07-30 PROCEDURE — 93976 VASCULAR STUDY: CPT

## 2019-07-30 PROCEDURE — 6360000004 HC RX CONTRAST MEDICATION: Performed by: EMERGENCY MEDICINE

## 2019-07-30 PROCEDURE — 83605 ASSAY OF LACTIC ACID: CPT

## 2019-07-30 PROCEDURE — 74177 CT ABD & PELVIS W/CONTRAST: CPT

## 2019-07-30 RX ORDER — 0.9 % SODIUM CHLORIDE 0.9 %
80 INTRAVENOUS SOLUTION INTRAVENOUS ONCE
Status: COMPLETED | OUTPATIENT
Start: 2019-07-30 | End: 2019-07-30

## 2019-07-30 RX ORDER — SENNOSIDES 8.6 MG
1300 CAPSULE ORAL EVERY 8 HOURS PRN
COMMUNITY
End: 2019-08-15 | Stop reason: SINTOL

## 2019-07-30 RX ORDER — SODIUM CHLORIDE 0.9 % (FLUSH) 0.9 %
10 SYRINGE (ML) INJECTION PRN
Status: DISCONTINUED | OUTPATIENT
Start: 2019-07-30 | End: 2019-07-30 | Stop reason: HOSPADM

## 2019-07-30 RX ADMIN — Medication 10 ML: at 18:00

## 2019-07-30 RX ADMIN — SODIUM CHLORIDE 80 ML: 9 INJECTION, SOLUTION INTRAVENOUS at 18:00

## 2019-07-30 RX ADMIN — IOVERSOL 75 ML: 741 INJECTION INTRA-ARTERIAL; INTRAVENOUS at 17:59

## 2019-07-30 ASSESSMENT — ENCOUNTER SYMPTOMS
COUGH: 0
NAUSEA: 1
ABDOMINAL PAIN: 1
BACK PAIN: 0
VOMITING: 0
DIARRHEA: 0
SHORTNESS OF BREATH: 0

## 2019-07-30 ASSESSMENT — PAIN SCALES - GENERAL: PAINLEVEL_OUTOF10: 0

## 2019-07-30 NOTE — ED NOTES
Report given to [facility], RN from ED. Report method in person   The following was reviewed with receiving RN:   Current vital signs:  BP (!) 137/93   Pulse 74   Temp 98.3 °F (36.8 °C) (Oral)   Resp 16   Ht 5' 5\" (1.651 m)   Wt 226 lb (102.5 kg)   LMP 07/10/2019   SpO2 98%   BMI 37.61 kg/m²                MEWS Score: 1     Any medication or safety alerts were reviewed. Any pending diagnostics and notifications were also reviewed, as well as any safety concerns or issues, abnormal labs, abnormal imaging, and abnormal assessment findings. Questions were answered.       Leighann Hardy RN  07/30/19 7458

## 2019-07-30 NOTE — ED PROVIDER NOTES
[address] ED  eMERGENCY dEPARTMENT eNCOUnter      Pt Name: Pinkey Lanes  MRN: [id number]  [place] 1985  Date of evaluation: 19      CHIEF COMPLAINT       Chief Complaint   Patient presents with    Abdominal Pain     HISTORY OF PRESENT ILLNESS   HPI 29 y.o. female presents with complaints of abdominal pain. The pain is located in the right upper quadrant radiates across her entire abdomen to her left lower quadrant. Symptoms started 3 days ago. Pain is rated as severe in intensity. She took a Norco prior to arrival.  Pain is worsened by movements twisting and turning or lifting. Patient had a umbilical hernia repair with mesh 2019. REVIEW OF SYSTEMS     Review of Systems   Constitutional: Negative for chills and fever. HENT: Negative for congestion. Eyes: Negative for visual disturbance. Respiratory: Negative for cough and shortness of breath. Cardiovascular: Negative for chest pain. Gastrointestinal: Positive for abdominal pain and nausea. Negative for diarrhea and vomiting. Genitourinary: Negative for dysuria and vaginal discharge. Musculoskeletal: Negative for arthralgias and back pain. Neurological: Negative for headaches. Hematological: Negative for adenopathy.          PAST MEDICAL HISTORY     Past Medical History:   Diagnosis Date    Abnormal Pap smear     LSIL    Blood type A+     Bone disease     fibrousdysplasia    Gestational diabetes     2 out of 3 preg== 1st-diet control----2nd-insulin/diet    Menarche @14y/o    Parity       --  vag del -- ALVERTO 9/10/2013    Postpartum depression 2011    Dx pp depression, put on Prozac       SURGICAL HISTORY       Past Surgical History:   Procedure Laterality Date    DILATION AND CURETTAGE OF UTERUS      LEG SURGERY Right 10/2012    right leg d/t fibrousdysplaia    UMBILICAL HERNIA REPAIR N/A 9778    HERNIA UMBILICAL REPAIR W/MESH performed by Tahira Chaudhry MD at [address] Discharge Medication List as of 7/30/2019  8:07 PM      CONTINUE these medications which have NOT CHANGED    Details   acetaminophen (TYLENOL 8 HOUR) 650 MG extended release tablet Take 1,300 mg by mouth every 8 hours as needed for PainHistorical Med      Multiple Vitamins-Minerals (THERAPEUTIC MULTIVITAMIN-MINERALS) tablet Take 1 tablet by mouth dailyHistorical Med      docusate sodium (COLACE) 100 MG capsule Take 1 capsule by mouth 2 times daily, Disp-30 capsule, R-2Print      ibuprofen (ADVIL;MOTRIN) 800 MG tablet Take 1 tablet by mouth every 8 hours as needed for Pain, Disp-25 tablet, R-0Print             ALLERGIES     is allergic to morphine. FAMILY HISTORY     She indicated that the status of her mother is unknown. She indicated that the status of her maternal grandmother is unknown. She indicated that the status of her maternal grandfather is unknown. SOCIAL HISTORY      reports that she has been smoking cigarettes. She has a 17.00 pack-year smoking history. She has never used smokeless tobacco. She reports that she does not drink alcohol or use drugs.     PHYSICAL EXAM     INITIAL VITALS: BP (!) 137/93   Pulse 74   Temp 98.3 °F (36.8 °C) (Oral)   Resp 16   Ht 5' 5\" (1.651 m)   Wt 226 lb (102.5 kg)   LMP 07/10/2019   SpO2 98%   BMI 37.61 kg/m²   General: NAD   head: Normocephalic, atraumatic  Eye: Pupils equal round reactive to light, no conjunctivitis  Heart: Regular rate and rhythm no murmurs  Lungs: Clear to auscultation bilaterally, no respiratory distress  Chest wall: No crepitus, no tenderness palpation  Abdomen: Soft, umbilical TTP, nondistended, with no peritoneal signs  Neurologic: Patient is alert and oriented x3, motor and sensation is intact in all 4 extremities, fluent speech  Extremities: Full range of motion, no cyanosis, no edema, no signs of trauma, no tenderness to palpation    MEDICAL DECISION MAKING:     MDM  This is a 60-year-old, a few months out from an umbilical hernia repair, presenting with complaints of severe abdominal pain. She is concerned that she has some complication from her hernia repair. She is tender on exam, will check laboratory studies repeat a CT scan. Patient is declining any analgesics at this time will continue to reassess closely. Hospital course:  5:36 PM  Laboratory studies unremarkable CT scan is pending. No distress. 6:30 PM  CT scan shows some possible free fluid. Will obtain pelvic US.     7:30 PM  US shows no sign of torsion. Small amount of pelvic fluid. Possible left adnexa prominent veins  - pt's pain is RUQ to umbilical, doubt pelvic congestion syndrome. Pt reassessed, abdomen remains soft, continues to decline any analgesics. No lifting until FU with her surgeon. DIAGNOSTIC RESULTS     RADIOLOGY:All plain film, CT, MRI, and formal ultrasound images (except ED bedside ultrasound) are read by the radiologist and the images and interpretations are directly viewed by the emergency physician. US NON OB TRANSVAGINAL   Final Result   Flow was seen within each ovary, arguing against acute torsion. Small amount of free pelvic fluid. Prominent veins are seen at the left adnexa, a nonspecific finding though   which have been described in conjunction with pelvic congestion syndrome, a   cause of chronic pelvic pain. US DUP ABD PEL RETRO SCROT LIMITED   Final Result   Flow was seen within each ovary, arguing against acute torsion. Small amount of free pelvic fluid. Prominent veins are seen at the left adnexa, a nonspecific finding though   which have been described in conjunction with pelvic congestion syndrome, a   cause of chronic pelvic pain. CT ABDOMEN PELVIS W IV CONTRAST   Final Result   Small amount of free pelvic fluid with density greater than simple fluid,   suggestive of hemorrhagic or proteinaceous debris.   Fullness of the adnexa   bilaterally can reflect underlying cystic change. If patient is focally   symptomatic, ultrasound could be considered for further assessment. Focal induration of subcutaneous fat deep to the umbilicus, typically   postoperative. Correlate with surgical history. Hepatomegaly. LABS: All lab results were reviewed by myself, and all abnormals are listed below. Labs Reviewed   COMPREHENSIVE METABOLIC PANEL - Abnormal; Notable for the following components:       Result Value    CREATININE 0.48 (*)     Total Bilirubin 0.18 (*)     All other components within normal limits   URINE RT REFLEX TO CULTURE   PREGNANCY, URINE   CBC WITH AUTO DIFFERENTIAL   LACTIC ACID       EMERGENCY DEPARTMENT COURSE:   Vitals:    Vitals:    07/30/19 1527   BP: (!) 137/93   Pulse: 74   Resp: 16   Temp: 98.3 °F (36.8 °C)   TempSrc: Oral   SpO2: 98%   Weight: 226 lb (102.5 kg)   Height: 5' 5\" (1.651 m)       The patient was given the following medications while in the emergency department:  Orders Placed This Encounter   Medications    ioversol (OPTIRAY) 74 % injection 75 mL    0.9 % sodium chloride bolus    sodium chloride flush 0.9 % injection 10 mL     -------------------------  CRITICAL CARE:   CONSULTS: None  PROCEDURES: Procedures     FINAL IMPRESSION      1.  Abdominal pain, unspecified abdominal location          DISPOSITION/PLAN   DISPOSITION        PATIENT REFERRED TO:  Neal Jaramillo MD  [address] [place] [address]  [phone]      as scheduled    [facility] ED  [address] [address]  [phone]    If symptoms worsen      DISCHARGE MEDICATIONS:  Discharge Medication List as of 7/30/2019  8:07 PM            Bola De La Graza MD  Attending Emergency Physician                      Bola De La Garza MD  07/30/19 3146

## 2019-08-14 NOTE — PATIENT INSTRUCTIONS
Patient Education        Eating Healthy Foods: Care Instructions  Your Care Instructions    Eating healthy foods can help lower your risk for disease. Healthy food gives you energy and keeps your heart strong, your brain active, your muscles working, and your bones strong. A healthy diet includes a variety of foods from the basic food groups: grains, vegetables, fruits, milk and milk products, and meat and beans. Some people may eat more of their favorite foods from only one food group and, as a result, miss getting the nutrients they need. So, it is important to pay attention not only to what you eat but also to what you are missing from your diet. You can eat a healthy, balanced diet by making a few small changes. Follow-up care is a key part of your treatment and safety. Be sure to make and go to all appointments, and call your doctor if you are having problems. It's also a good idea to know your test results and keep a list of the medicines you take. How can you care for yourself at home? Look at what you eat  · Keep a food diary for a week or two and record everything you eat or drink. Track the number of servings you eat from each food group. · For a balanced diet every day, eat a variety of:  ? 6 or more ounce-equivalents of grains, such as cereals, breads, crackers, rice, or pasta, every day. An ounce-equivalent is 1 slice of bread, 1 cup of ready-to-eat cereal, or ½ cup of cooked rice, cooked pasta, or cooked cereal.  ? 2½ cups of vegetables, especially:  § Dark-green vegetables such as broccoli and spinach. § Orange vegetables such as carrots and sweet potatoes. § Dry beans (such as aguayo and kidney beans) and peas (such as lentils). ? 2 cups of fresh, frozen, or canned fruit. A small apple or 1 banana or orange equals 1 cup. ? 3 cups of nonfat or low-fat milk, yogurt, or other milk products. ? 5½ ounces of meat and beans, such as chicken, fish, lean meat, beans, nuts, and seeds.  One egg, 1
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
## 2019-08-15 ENCOUNTER — OFFICE VISIT (OUTPATIENT)
Dept: FAMILY MEDICINE CLINIC | Age: 34
End: 2019-08-15
Payer: COMMERCIAL

## 2019-08-15 VITALS
HEART RATE: 67 BPM | DIASTOLIC BLOOD PRESSURE: 66 MMHG | SYSTOLIC BLOOD PRESSURE: 95 MMHG | HEIGHT: 65 IN | BODY MASS INDEX: 36.65 KG/M2 | WEIGHT: 220 LBS

## 2019-08-15 DIAGNOSIS — R10.84 GENERALIZED ABDOMINAL PAIN: Primary | ICD-10-CM

## 2019-08-15 DIAGNOSIS — Z76.89 ESTABLISHING CARE WITH NEW DOCTOR, ENCOUNTER FOR: ICD-10-CM

## 2019-08-15 DIAGNOSIS — R53.82 CHRONIC FATIGUE: ICD-10-CM

## 2019-08-15 DIAGNOSIS — M79.604 CHRONIC PAIN OF RIGHT LOWER EXTREMITY: ICD-10-CM

## 2019-08-15 DIAGNOSIS — Z98.890 S/P HERNIA REPAIR: ICD-10-CM

## 2019-08-15 DIAGNOSIS — G89.29 CHRONIC PAIN OF RIGHT LOWER EXTREMITY: ICD-10-CM

## 2019-08-15 DIAGNOSIS — R16.0 HEPATOMEGALY: ICD-10-CM

## 2019-08-15 DIAGNOSIS — Z87.19 S/P HERNIA REPAIR: ICD-10-CM

## 2019-08-15 DIAGNOSIS — F17.200 CURRENT SMOKER: ICD-10-CM

## 2019-08-15 DIAGNOSIS — Z13.1 SCREENING FOR DIABETES MELLITUS: ICD-10-CM

## 2019-08-15 PROCEDURE — 99204 OFFICE O/P NEW MOD 45 MIN: CPT | Performed by: FAMILY MEDICINE

## 2019-08-15 ASSESSMENT — PATIENT HEALTH QUESTIONNAIRE - PHQ9
SUM OF ALL RESPONSES TO PHQ9 QUESTIONS 1 & 2: 0
SUM OF ALL RESPONSES TO PHQ QUESTIONS 1-9: 0
SUM OF ALL RESPONSES TO PHQ QUESTIONS 1-9: 0
2. FEELING DOWN, DEPRESSED OR HOPELESS: 0
1. LITTLE INTEREST OR PLEASURE IN DOING THINGS: 0

## 2019-08-15 ASSESSMENT — ENCOUNTER SYMPTOMS
RESPIRATORY NEGATIVE: 1
SHORTNESS OF BREATH: 0
CONSTIPATION: 0
VOMITING: 0
APNEA: 0
CHEST TIGHTNESS: 0
DIARRHEA: 0
SORE THROAT: 0
NAUSEA: 0
EYE PAIN: 0
COLOR CHANGE: 0
COUGH: 0
TROUBLE SWALLOWING: 0
ABDOMINAL PAIN: 0

## 2019-08-26 ENCOUNTER — HOSPITAL ENCOUNTER (OUTPATIENT)
Age: 34
Setting detail: SPECIMEN
Discharge: HOME | End: 2019-08-26
Payer: COMMERCIAL

## 2019-08-26 ENCOUNTER — OFFICE VISIT (OUTPATIENT)
Dept: OBGYN CLINIC | Age: 34
End: 2019-08-26
Payer: COMMERCIAL

## 2019-08-26 VITALS
HEART RATE: 78 BPM | HEIGHT: 65 IN | SYSTOLIC BLOOD PRESSURE: 104 MMHG | WEIGHT: 218 LBS | DIASTOLIC BLOOD PRESSURE: 62 MMHG | BODY MASS INDEX: 36.32 KG/M2

## 2019-08-26 DIAGNOSIS — Z01.419 WELL WOMAN EXAM: Primary | ICD-10-CM

## 2019-08-26 PROCEDURE — 99395 PREV VISIT EST AGE 18-39: CPT | Performed by: SPECIALIST

## 2019-08-26 ASSESSMENT — ENCOUNTER SYMPTOMS
VOMITING: 0
EYE PAIN: 0
COUGH: 0
ABDOMINAL PAIN: 0
CONSTIPATION: 0
NAUSEA: 0
DIARRHEA: 0
ABDOMINAL DISTENTION: 0
APNEA: 0

## 2019-08-26 NOTE — PROGRESS NOTES
SMEAR     Appointment in 1 year. Dante Kelly am scribing for, and in the presence of Dr. Olvin Mendez. Electronically signed by: Gt Lemus 8/26/19 3:42 PM       I agree to the above documentation placed by my scribe Gt Lemus. I reviewed the scribe's note and agree with the documented findings and plan of care. Any areas of disagreement are noted on the chart. I have personally evaluated this patient. Additional findings are as noted. I agree with the chief complaint, past medical history, past surgical history, allergies, medications, social and family history as documented unless otherwise noted below.      Electronically signed by Olvin Mendez MD on 8/27/2019 at 2:32 AM

## 2019-09-09 LAB — CYTOLOGY REPORT: NORMAL

## 2019-09-10 DIAGNOSIS — B96.89 BV (BACTERIAL VAGINOSIS): Primary | ICD-10-CM

## 2019-09-10 DIAGNOSIS — N76.0 BV (BACTERIAL VAGINOSIS): Primary | ICD-10-CM

## 2019-09-10 RX ORDER — FLUCONAZOLE 100 MG/1
100 TABLET ORAL DAILY
Qty: 7 TABLET | Refills: 0 | Status: SHIPPED | OUTPATIENT
Start: 2019-09-10 | End: 2019-09-17

## 2019-09-10 RX ORDER — METRONIDAZOLE 500 MG/1
500 TABLET ORAL 2 TIMES DAILY
Qty: 14 TABLET | Refills: 0 | Status: SHIPPED | OUTPATIENT
Start: 2019-09-10 | End: 2019-09-17

## 2019-09-12 ENCOUNTER — HOSPITAL ENCOUNTER (OUTPATIENT)
Age: 34
Discharge: HOME | End: 2019-09-12
Payer: COMMERCIAL

## 2019-09-12 DIAGNOSIS — R53.82 CHRONIC FATIGUE: ICD-10-CM

## 2019-09-12 DIAGNOSIS — Z76.89 ESTABLISHING CARE WITH NEW DOCTOR, ENCOUNTER FOR: ICD-10-CM

## 2019-09-12 DIAGNOSIS — R10.84 GENERALIZED ABDOMINAL PAIN: ICD-10-CM

## 2019-09-12 DIAGNOSIS — R16.0 HEPATOMEGALY: ICD-10-CM

## 2019-09-12 DIAGNOSIS — Z13.1 SCREENING FOR DIABETES MELLITUS: ICD-10-CM

## 2019-09-12 DIAGNOSIS — Z98.890 S/P HERNIA REPAIR: ICD-10-CM

## 2019-09-12 DIAGNOSIS — Z87.19 S/P HERNIA REPAIR: ICD-10-CM

## 2019-09-12 LAB
CHOLESTEROL, FASTING: 166 MG/DL
CHOLESTEROL/HDL RATIO: 4.5
HDLC SERPL-MCNC: 37 MG/DL
LDL CHOLESTEROL: 112 MG/DL (ref 0–130)
TRIGLYCERIDE, FASTING: 85 MG/DL
TSH SERPL DL<=0.05 MIU/L-ACNC: 1.01 MIU/L (ref 0.3–5)
VITAMIN D 25-HYDROXY: 21.6 NG/ML (ref 30–100)
VLDLC SERPL CALC-MCNC: ABNORMAL MG/DL (ref 1–30)

## 2019-09-12 PROCEDURE — 84443 ASSAY THYROID STIM HORMONE: CPT

## 2019-09-12 PROCEDURE — 82306 VITAMIN D 25 HYDROXY: CPT

## 2019-09-12 PROCEDURE — 80061 LIPID PANEL: CPT

## 2019-09-12 PROCEDURE — 36415 COLL VENOUS BLD VENIPUNCTURE: CPT

## 2019-09-12 PROCEDURE — 83036 HEMOGLOBIN GLYCOSYLATED A1C: CPT

## 2019-09-13 LAB
ESTIMATED AVERAGE GLUCOSE: 120 MG/DL
HBA1C MFR BLD: 5.8 % (ref 4–6)

## 2019-09-24 NOTE — PATIENT INSTRUCTIONS
cookies. · Bake, broil, or steam foods. Don't nair them. · Be physically active. Get at least 30 minutes of exercise on most days of the week. Walking is a good choice. You also may want to do other activities, such as running, swimming, cycling, or playing tennis or team sports. · Stay at a healthy weight or lose weight by making the changes in eating and physical activity listed above. Losing just a small amount of weight, even 5 to 10 pounds, can reduce your risk for having a heart attack or stroke. · Do not smoke. When should you call for help? Watch closely for changes in your health, and be sure to contact your doctor if:    · You need help making lifestyle changes.     · You have questions about your medicine. Where can you learn more? Go to [email or web address] org and sign in to your [employer] account. Enter G802 in the [employer] box to learn more about \"High Cholesterol: Care Instructions. \"     If you do not have an account, please click on the \"Sign Up Now\" link. Current as of: July 22, 2018  Content Version: 12.1  © 5220-5117 [employer]. Care instructions adapted under license by [facility] [place] ([facility]). If you have questions about a medical condition or this instruction, always ask your healthcare professional. [address] any warranty or liability for your use of this information. Patient Education        Learning About Vitamin D  Why is it important to get enough vitamin D? Your body needs vitamin D to absorb calcium. Calcium keeps your bones and muscles, including your heart, healthy and strong. If your muscles don't get enough calcium, they can cramp, hurt, or feel weak. You may have long-term (chronic) muscle aches and pains. If you don't get enough vitamin D throughout life, you have an increased chance of having thin and brittle bones (osteoporosis) in your later years.  Children who don't get enough vitamin D may not grow as much as others their age. They also have a chance of getting a rare disease called rickets. It causes weak bones. Vitamin D and calcium are added to many foods. And your body uses sunshine to make its own vitamin D. How much vitamin D do you need? The [place] of Medicine recommends that people ages 3 through 79 get 600 IU (international units) every day. Adults 71 and older need 800 IU every day. Blood tests for vitamin D can check your vitamin D level. But there is no standard normal range used by all laboratories. You're likely getting enough vitamin D if your levels are in the range of 20 to 50 ng/mL. How can you get more vitamin D? Foods that contain vitamin D include:  · [place], tuna, and mackerel. These are some of the best foods to eat when you need to get more vitamin D.  · Cheese, egg yolks, and beef liver. These foods have vitamin D in small amounts. · Milk, soy drinks, orange juice, yogurt, margarine, and some kinds of cereal have vitamin D added to them. Some people don't make vitamin D as well as others. They may have to take extra care in getting enough vitamin D. Things that reduce how much vitamin D your body makes include:  · Dark skin, such as many  Americans have. · Age, especially if you are older than 72. · Digestive problems, such as Crohn's or celiac disease. · Liver and kidney disease. Some people who do not get enough vitamin D may need supplements. Are there any risks from taking vitamin D?  · Too much vitamin D:  ? Can damage your kidneys. ? Can cause nausea and vomiting, constipation, and weakness. ? Raises the amount of calcium in your blood. If this happens, you can get confused or have an irregular heart rhythm. · Vitamin D may interact with other medicines. Tell your doctor about all of the medicines you take, including over-the-counter drugs, herbs, and pills. Tell your doctor about all of your current medical problems. Where can you learn more?   Go to

## 2019-09-24 NOTE — PROGRESS NOTES
[facility] PHYSIC[facility]ANS  [facility] [address] [facility] [address]  [address] [address]  Dept: [phone]     2019   Mayelin Melvin (:  1985) is a 36 y.o. female, No chief complaint on file. Health Maintenance Due   Topic Date Due    Pneumococcal 0-64 years Vaccine (1 of 1 - PPSV23) 1992    Varicella Vaccine (1 of 2 - 13+ 2-dose series) 1999    Flu vaccine (1) 2019       HPI    Mayelin Melvin is a 36 y.o. female is here to discuss recent lab results. Dyslipidemia. Education regarding hyperlipidemia was given. Questions answered. Reviewed diet and exercise. Encouraged regular exercise. Discussed diet modification. Medications: none. . The patient does not use medications that may worsen dyslipidemias (corticosteroids, progestins, anabolic steroids, diuretics, beta-blockers, amiodarone, cyclosporine, olanzapine). The patient exercises frequently. The patient is known to have coexisting coronary artery disease. The 10-year CVD risk score (D'Agostino, et al., 2008) is: 1.6%    Values used to calculate the score:      Age: 36 years      Sex: Female      Diabetic: No      Tobacco smoker: Yes      Systolic Blood Pressure: 507 mmHg      Is BP treated: No      HDL Cholesterol: 37 mg/dL      Total Cholesterol: 166 mg/dL   Patient has a 10-yr ASCVD risk of >7.5% without DM and is therefore a candidate for none-intensity statin therapy based on updated guidelines. Patient is not prescribed not-intensity statin therapy.   Lab Results   Component Value Date    CHOL 188 2012     Lab Results   Component Value Date    TRIG 180 (H) 2012     Lab Results   Component Value Date    HDL 37 (L) 2019    HDL 45 2012     Lab Results   Component Value Date    LDLCHOLESTEROL 112 2019    LDLCHOLESTEROL 107 (H) 2012     Lab Results   Component Value Date    VLDL NOT REPORTED 2019    VLDL NOT REPORTED 2012     Lab Results present. Cardiovascular: Normal rate, regular rhythm, normal heart sounds and intact distal pulses. Exam reveals no gallop and no friction rub. No murmur heard. Pulmonary/Chest: Effort normal and breath sounds normal. No respiratory distress. She has no wheezes. She has no rales. Abdominal: Soft. Bowel sounds are normal. She exhibits no distension. There is no tenderness. There is no rebound and no guarding. No hernia. Musculoskeletal: Normal range of motion. She exhibits no edema. Right upper leg: She exhibits no tenderness (varicose veins) and no swelling. Varicose veins both LE. Lymphadenopathy:     She has no cervical adenopathy. Neurological: She is alert and oriented to person, place, and time. No sensory deficit. Coordination normal.   Skin: Skin is warm and dry. Capillary refill takes less than 2 seconds. No rash noted. Psychiatric: She has a normal mood and affect. Her behavior is normal. Judgment and thought content normal.   Nursing note and vitals reviewed. Most recent labs reviewed with patient, and all questions answered.   Lab Results   Component Value Date    WBC 7.8 07/30/2019    HGB 13.9 07/30/2019    HCT 41.1 07/30/2019    MCV 95.2 07/30/2019     07/30/2019     Lab Results   Component Value Date     07/30/2019    K 4.0 07/30/2019     07/30/2019    CO2 20 07/30/2019    BUN 12 07/30/2019    CREATININE 0.48 07/30/2019    GLUCOSE 93 07/30/2019    GLUCOSE 76 01/11/2012    CALCIUM 8.9 07/30/2019      Lab Results   Component Value Date    ALT 13 07/30/2019    AST 13 07/30/2019    ALKPHOS 56 07/30/2019    BILITOT 0.18 (L) 07/30/2019     Lab Results   Component Value Date    TSH 1.01 09/12/2019     Lab Results   Component Value Date    CHOL 188 06/23/2012     Lab Results   Component Value Date    TRIG 180 (H) 06/23/2012     Lab Results   Component Value Date    HDL 37 (L) 09/12/2019    HDL 45 06/23/2012     Lab Results   Component Value Date    LDLCHOLESTEROL

## 2019-09-26 ENCOUNTER — OFFICE VISIT (OUTPATIENT)
Dept: FAMILY MEDICINE CLINIC | Age: 34
End: 2019-09-26
Payer: COMMERCIAL

## 2019-09-26 VITALS
BODY MASS INDEX: 35.39 KG/M2 | WEIGHT: 212.4 LBS | HEART RATE: 67 BPM | RESPIRATION RATE: 18 BRPM | SYSTOLIC BLOOD PRESSURE: 100 MMHG | HEIGHT: 65 IN | OXYGEN SATURATION: 97 % | DIASTOLIC BLOOD PRESSURE: 66 MMHG | TEMPERATURE: 98.1 F

## 2019-09-26 DIAGNOSIS — R73.03 PREDIABETES: Primary | ICD-10-CM

## 2019-09-26 DIAGNOSIS — M79.604 CHRONIC PAIN OF RIGHT LOWER EXTREMITY: ICD-10-CM

## 2019-09-26 DIAGNOSIS — G89.29 CHRONIC PAIN OF RIGHT LOWER EXTREMITY: ICD-10-CM

## 2019-09-26 DIAGNOSIS — E55.9 VITAMIN D INSUFFICIENCY: ICD-10-CM

## 2019-09-26 DIAGNOSIS — I83.813 VARICOSE VEINS OF BOTH LOWER EXTREMITIES WITH PAIN: ICD-10-CM

## 2019-09-26 DIAGNOSIS — E78.5 DYSLIPIDEMIA: ICD-10-CM

## 2019-09-26 PROCEDURE — 4004F PT TOBACCO SCREEN RCVD TLK: CPT | Performed by: FAMILY MEDICINE

## 2019-09-26 PROCEDURE — G8417 CALC BMI ABV UP PARAM F/U: HCPCS | Performed by: FAMILY MEDICINE

## 2019-09-26 PROCEDURE — 99213 OFFICE O/P EST LOW 20 MIN: CPT | Performed by: FAMILY MEDICINE

## 2019-09-26 PROCEDURE — G8427 DOCREV CUR MEDS BY ELIG CLIN: HCPCS | Performed by: FAMILY MEDICINE

## 2019-09-26 ASSESSMENT — ENCOUNTER SYMPTOMS
EYE PAIN: 0
COUGH: 0
SHORTNESS OF BREATH: 0
DIARRHEA: 0
SORE THROAT: 0
RESPIRATORY NEGATIVE: 1
CHEST TIGHTNESS: 0
CONSTIPATION: 0
ABDOMINAL PAIN: 0
APNEA: 0
COLOR CHANGE: 0
TROUBLE SWALLOWING: 0
VOMITING: 0
NAUSEA: 0

## 2019-09-26 NOTE — PROGRESS NOTES
Visit Information    Have you changed or started any medications since your last visit including any over-the-counter medicines, vitamins, or herbal medicines? no   Are you having any side effects from any of your medications? -  no  Have you stopped taking any of your medications? Is so, why? -  no    Have you seen any other physician or provider since your last visit? Yes - Records Obtained  Have you had any other diagnostic tests since your last visit? Yes - Records Obtained  Have you been seen in the emergency room and/or had an admission to a hospital since we last saw you? No  Have you had your routine dental cleaning in the past 6 months? no    Have you activated your [employer] account? If not, what are your barriers?  Yes     Patient Care Team:  JUSTIN Rosado CNP as PCP - General (Family Medicine)  JUSTIN Urena CNP as PCP - [facility] Provider    Medical History Review  Past Medical, Family, and Social History reviewed and does not contribute to the patient presenting condition    Health Maintenance   Topic Date Due    Pneumococcal 0-64 years Vaccine (1 of 1 - PPSV23) 08/30/1992    Varicella Vaccine (1 of 2 - 13+ 2-dose series) 08/30/1999    Flu vaccine (1) 09/01/2019    A1C test (Diabetic or Prediabetic)  09/12/2020    Cervical cancer screen  08/26/2022    DTaP/Tdap/Td vaccine (2 - Td) 06/18/2025    HIV screen  Completed

## 2019-09-30 ENCOUNTER — HOSPITAL ENCOUNTER (OUTPATIENT)
Dept: ULTRASOUND IMAGING | Age: 34
Discharge: HOME | End: 2019-10-02
Payer: COMMERCIAL

## 2019-09-30 DIAGNOSIS — R16.0 HEPATOMEGALY: ICD-10-CM

## 2019-09-30 PROCEDURE — 76705 ECHO EXAM OF ABDOMEN: CPT

## 2019-10-27 ASSESSMENT — ENCOUNTER SYMPTOMS
CHEST TIGHTNESS: 0
DIARRHEA: 0
SORE THROAT: 0
TROUBLE SWALLOWING: 0
SHORTNESS OF BREATH: 0
NAUSEA: 0
COLOR CHANGE: 0
EYE PAIN: 0
APNEA: 0
CONSTIPATION: 0
RESPIRATORY NEGATIVE: 1
ABDOMINAL PAIN: 0
VOMITING: 0
COUGH: 0

## 2019-10-28 ENCOUNTER — OFFICE VISIT (OUTPATIENT)
Dept: FAMILY MEDICINE CLINIC | Age: 34
End: 2019-10-28
Payer: COMMERCIAL

## 2019-10-28 VITALS
WEIGHT: 213 LBS | HEIGHT: 65 IN | HEART RATE: 72 BPM | BODY MASS INDEX: 35.49 KG/M2 | OXYGEN SATURATION: 97 % | DIASTOLIC BLOOD PRESSURE: 60 MMHG | SYSTOLIC BLOOD PRESSURE: 108 MMHG

## 2019-10-28 DIAGNOSIS — I83.813 VARICOSE VEINS OF BOTH LOWER EXTREMITIES WITH PAIN: ICD-10-CM

## 2019-10-28 DIAGNOSIS — R16.0 HEPATOMEGALY: Primary | ICD-10-CM

## 2019-10-28 DIAGNOSIS — Z23 NEED FOR PNEUMOCOCCAL VACCINATION: ICD-10-CM

## 2019-10-28 DIAGNOSIS — Z23 NEED FOR INFLUENZA VACCINATION: ICD-10-CM

## 2019-10-28 PROCEDURE — 90472 IMMUNIZATION ADMIN EACH ADD: CPT | Performed by: FAMILY MEDICINE

## 2019-10-28 PROCEDURE — 4004F PT TOBACCO SCREEN RCVD TLK: CPT | Performed by: FAMILY MEDICINE

## 2019-10-28 PROCEDURE — G8427 DOCREV CUR MEDS BY ELIG CLIN: HCPCS | Performed by: FAMILY MEDICINE

## 2019-10-28 PROCEDURE — 99213 OFFICE O/P EST LOW 20 MIN: CPT | Performed by: FAMILY MEDICINE

## 2019-10-28 PROCEDURE — G8417 CALC BMI ABV UP PARAM F/U: HCPCS | Performed by: FAMILY MEDICINE

## 2019-10-28 PROCEDURE — 90732 PPSV23 VACC 2 YRS+ SUBQ/IM: CPT | Performed by: FAMILY MEDICINE

## 2019-10-28 PROCEDURE — 90686 IIV4 VACC NO PRSV 0.5 ML IM: CPT | Performed by: FAMILY MEDICINE

## 2019-10-28 PROCEDURE — 90471 IMMUNIZATION ADMIN: CPT | Performed by: FAMILY MEDICINE

## 2019-10-28 PROCEDURE — G8482 FLU IMMUNIZE ORDER/ADMIN: HCPCS | Performed by: FAMILY MEDICINE

## 2020-01-27 ENCOUNTER — HOSPITAL ENCOUNTER (EMERGENCY)
Age: 35
Discharge: HOME | End: 2020-01-27
Attending: EMERGENCY MEDICINE
Payer: COMMERCIAL

## 2020-01-27 VITALS
HEART RATE: 72 BPM | OXYGEN SATURATION: 99 % | RESPIRATION RATE: 16 BRPM | DIASTOLIC BLOOD PRESSURE: 65 MMHG | TEMPERATURE: 98.3 F | HEIGHT: 65 IN | BODY MASS INDEX: 33.99 KG/M2 | SYSTOLIC BLOOD PRESSURE: 125 MMHG | WEIGHT: 204 LBS

## 2020-01-27 PROCEDURE — 99282 EMERGENCY DEPT VISIT SF MDM: CPT

## 2020-01-27 RX ORDER — PENICILLIN V POTASSIUM 500 MG/1
500 TABLET ORAL 4 TIMES DAILY
Qty: 40 TABLET | Refills: 0 | Status: SHIPPED | OUTPATIENT
Start: 2020-01-27 | End: 2020-02-06

## 2020-01-27 RX ORDER — IBUPROFEN 800 MG/1
800 TABLET ORAL EVERY 8 HOURS PRN
Qty: 20 TABLET | Refills: 0 | Status: SHIPPED | OUTPATIENT
Start: 2020-01-27 | End: 2020-09-17

## 2020-01-27 ASSESSMENT — PAIN DESCRIPTION - LOCATION: LOCATION: MOUTH;JAW

## 2020-01-27 ASSESSMENT — PAIN DESCRIPTION - ONSET: ONSET: PROGRESSIVE

## 2020-01-27 ASSESSMENT — PAIN DESCRIPTION - PAIN TYPE: TYPE: ACUTE PAIN

## 2020-01-27 ASSESSMENT — PAIN DESCRIPTION - FREQUENCY: FREQUENCY: INTERMITTENT

## 2020-01-27 ASSESSMENT — PAIN DESCRIPTION - DESCRIPTORS: DESCRIPTORS: THROBBING;SHARP

## 2020-01-27 ASSESSMENT — PAIN SCALES - GENERAL: PAINLEVEL_OUTOF10: 8

## 2020-01-27 ASSESSMENT — PAIN DESCRIPTION - ORIENTATION: ORIENTATION: LEFT;LOWER

## 2020-01-27 NOTE — ED PROVIDER NOTES
[address]  eMERGENCY dEPARTMENT eNCOUnter      Pt Name: Amarilys Gross  MRN: [id number]  [place] 1985  Date of evaluation: 1/27/20      CHIEF COMPLAINT:   Chief Complaint   Patient presents with    Dental Pain     patient reports that a cap fell off her (L) lower molar and she believes that it is infected. Has an appointment at [facility] on 2/6/2020. HISTORY OF PRESENT ILLNESS    Amarilys Gross is a 36 y.o. female who presents with complaints of left lower dental pain that began on Saturday. Pt states her tooth chipped and a cap fell off when she was eating. Reports her gum is now swollen and the pain is radiating to her ear and into her  Head. Denies n/v/f/c/abd pain/CP/SOB. Pt has a dental appointment on 2/6/20. REVIEW OF SYSTEMS     Constitutional: Denies recent fever, chills. Eyes: No visual changes. Neck: No neck pain. Respiratory: Denies recent shortness of breath. Cardiac:  Denies recent chest pain. GI: denies any recent abdominal pain nausea or vomiting. Denies Blood in the stool or black tarry stools. : denies dysuria. Musculoskeletal: Denies focal weakness. Neurologic: denies headache or focal weakness. Skin:  Denies any rash. Negative in 10 essential Systems except as mentioned above and in the HPI. PAST MEDICAL HISTORY   PMH:  has a past medical history of Abnormal Pap smear, Blood type A+, Bone disease, Gestational diabetes, Menarche, Parity, Postpartum depression, and Venous insufficiency of right leg. none otherwise stated in nurses notes  Surgical History:  has a past surgical history that includes Leg Surgery (Right, 10/2012); Dilation and curettage of uterus; and Umbilical hernia repair (N/A, 4/25/2019). none otherwise stated in nurses notes  Social History:  reports that she has been smoking cigarettes. She has a 17.00 pack-year smoking history.  She has never used smokeless tobacco. She reports that she does not drink alcohol or use drugs. none otherwise stated in nurses notes  Family History: none otherwise stated in nurses notes  Psychiatric History: none otherwise stated in nurses notes    Allergies:is allergic to morphine. PHYSICAL EXAM     INITIAL VITALS: /65   Pulse 72   Temp 98.3 °F (36.8 °C) (Oral)   Resp 16   Ht 5' 5\" (1.651 m)   Wt 204 lb (92.5 kg)   LMP 01/10/2020   SpO2 99%   BMI 33.95 kg/m²   CONSTITUTIONAL: Vital signs reviewed, Alert and oriented X 3. HEAD: Atraumatic, Normocephalic. EYES: Eyes are normal to inspection, Pupils equal, round and reactive to light. NECK: Normal ROM, No jugular venous distention, No meningeal signs, Cervical spine nontender. MOUTH:  + dental pain at 20 chipped with to no cap. Tenderness over the gum, with no signs of abscess formation or Bassem sign noted. No swelling involving the airway at all. No trismus. Lips are normal.  No tongue elevation. No tenderness over floor of mouth. Controlling secretions. Speaking in full sentences. RESPIRATORY CHEST: No respiratory distress. ABDOMEN: Abdomen is nontender, No distension. UPPER EXTREMITY: Inspection normal, No cyanosis. NEURO: GCS is 15, Speech normal, Memory normal.   SKIN: Skin is warm, Skin is dry. PSYCHIATRIC: Oriented X 3, Normal affect. EMERGENCY DEPARTMENT COURSE:   Pain meds and antibiotic prescriptions. Pt provided with dental clinic list and instructed to call as soon as possible for an appointment. Instructed to return for worsening or any new symptoms including throat swelling, difficulty swallowing or breathing. Pt agrees. FINAL IMPRESSION:     1.  Pain, dental          DISPOSITION:  DISPOSITION Decision To Discharge 01/27/2020 03:33:50 PM        PATIENT REFERRED TO:  Pamela Casas, APRN - SANDRA  [address]  [address]  [address]  [address] [address]  [address]

## 2020-01-28 NOTE — ED PROVIDER NOTES
eMERGENCY dEPARTMENT eNCOUnter   Independent Attestation     Pt Name: Carolina Moore  MRN: [id number]  [place] 1985  Date of evaluation: 1/28/20     Carolina Moore is a 36 y.o. female with CC: Dental Pain (patient reports that a cap fell off her (L) lower molar and she believes that it is infected. Has an appointment at [facility] on 2/6/2020.)      Based on the medical record the care appears appropriate. I was personally available for consultation in the Emergency Department.     Karley Silvestre MD  Attending Emergency Physician                    Nikunj Rabago MD  01/28/20 [id number]

## 2020-01-29 ENCOUNTER — TELEPHONE (OUTPATIENT)
Dept: FAMILY MEDICINE CLINIC | Age: 35
End: 2020-01-29

## 2020-01-29 NOTE — TELEPHONE ENCOUNTER
[facility] ([facility] ED Follow up Call    Reason for ED visit:  Dental pain          Hi Taylor Navas , this is Rema Zambrano from Dr. Nedra Leonard's office, just calling to see how you are doing after your recent ED visit. Did you receive discharge instructions? Yes  Do you understand the discharge instructions? Yes  Did the ED give you any new prescriptions? Yes  Were you able to fill your prescriptions? Yes      Do you have one of our red, yellow and green  Zone sheets that help you to determine when you should go to the ED? Yes      Do you need or want to make a follow up appt with your PCP? No    Do you have any further needs in the home i.e. Equipment? No        FU appts/Provider:    No future appointments.

## 2020-09-17 ENCOUNTER — OFFICE VISIT (OUTPATIENT)
Dept: OBGYN CLINIC | Age: 35
End: 2020-09-17
Payer: COMMERCIAL

## 2020-09-17 ENCOUNTER — HOSPITAL ENCOUNTER (OUTPATIENT)
Age: 35
Setting detail: SPECIMEN
Discharge: HOME | End: 2020-09-17
Payer: COMMERCIAL

## 2020-09-17 VITALS
BODY MASS INDEX: 37.65 KG/M2 | WEIGHT: 226 LBS | HEIGHT: 65 IN | SYSTOLIC BLOOD PRESSURE: 114 MMHG | DIASTOLIC BLOOD PRESSURE: 72 MMHG | TEMPERATURE: 98.5 F | HEART RATE: 71 BPM

## 2020-09-17 LAB
CONTROL: PRESENT
HCG QUANTITATIVE: ABNORMAL IU/L
PREGNANCY TEST URINE, POC: POSITIVE

## 2020-09-17 PROCEDURE — G8417 CALC BMI ABV UP PARAM F/U: HCPCS | Performed by: CLINICAL NURSE SPECIALIST

## 2020-09-17 PROCEDURE — 36415 COLL VENOUS BLD VENIPUNCTURE: CPT | Performed by: CLINICAL NURSE SPECIALIST

## 2020-09-17 PROCEDURE — 81025 URINE PREGNANCY TEST: CPT | Performed by: CLINICAL NURSE SPECIALIST

## 2020-09-17 PROCEDURE — G8427 DOCREV CUR MEDS BY ELIG CLIN: HCPCS | Performed by: CLINICAL NURSE SPECIALIST

## 2020-09-17 PROCEDURE — 4004F PT TOBACCO SCREEN RCVD TLK: CPT | Performed by: CLINICAL NURSE SPECIALIST

## 2020-09-17 PROCEDURE — 99213 OFFICE O/P EST LOW 20 MIN: CPT | Performed by: CLINICAL NURSE SPECIALIST

## 2020-09-17 RX ORDER — PROMETHAZINE HYDROCHLORIDE 25 MG/1
25 TABLET ORAL EVERY 8 HOURS PRN
Qty: 30 TABLET | Refills: 2 | Status: SHIPPED | OUTPATIENT
Start: 2020-09-17 | End: 2021-02-09

## 2020-09-17 RX ORDER — PRENATAL VIT 91/IRON/FOLIC/DHA 28-975-200
1 COMBINATION PACKAGE (EA) ORAL DAILY
Qty: 30 EACH | Refills: 11 | Status: SHIPPED | OUTPATIENT
Start: 2020-09-17 | End: 2020-12-08

## 2020-09-17 ASSESSMENT — PATIENT HEALTH QUESTIONNAIRE - PHQ9
1. LITTLE INTEREST OR PLEASURE IN DOING THINGS: 0
2. FEELING DOWN, DEPRESSED OR HOPELESS: 0
SUM OF ALL RESPONSES TO PHQ QUESTIONS 1-9: 0
SUM OF ALL RESPONSES TO PHQ QUESTIONS 1-9: 0
SUM OF ALL RESPONSES TO PHQ9 QUESTIONS 1 & 2: 0

## 2020-09-17 NOTE — PROGRESS NOTES
DATE OF VISIT:  20  PATIENT NAME:  Carlos Jung     YOB: 1985    SUBJECTIVE:    Chief Complaint:  Chief Complaint   Patient presents with    Amenorrhea       HPI:  Lopez Sahiljayashreefam  is being seen today for missed menses. She reports a  positive pregnancy test on n/a. This  is not a planned pregnancy. She is  accepting at this time. LMP:20     Sure and definite: Yes     28 day cycle: Yes    She was not on a contraceptive at the time of conception. Estimated weeks gestation today : 10w 5d  Tentative ALVERTO: 4/10/21    Relationship with FOB: involved    OBJECTIVE:    Vitals:    20 1512   BP: 114/72   Site: Left Upper Arm   Position: Sitting   Cuff Size: Small Adult   Pulse: 71   Temp: 98.5 °F (36.9 °C)   TempSrc: Temporal   Weight: 226 lb (102.5 kg)   Height: 5' 5\" (1.651 m)     Body mass index is 37.61 kg/m². No LMP recorded. Mother's ethnicity:    Father's ethnicity:      She is complaining today of:   Pain: Yes  Cramping: Yes  Bleeding or spotting: No    Nausea: Yes  Vomiting: No    Breast enlargement/tenderness: Yes  Fatigue: Yes  Frequency of urination: Yes    Previous high risk obstetrical history: n/a  OB History    Para Term  AB Living   6 4 5 0 1 5   SAB TAB Ectopic Molar Multiple Live Births   1 0 0   0 6      # Outcome Date GA Lbr Randy/2nd Weight Sex Delivery Anes PTL Lv   6 Term 09/01/15 40w4d  8 lb 8 oz (3.856 kg) M Vag-Spont EPI  MIKE      Birth Comments: Education information given to mother and she verbalizes understanding about the following:  Hour for [employer]. Patient Safety Education. Infant security including the four band system and the HUGS system.   Skin to Skin Contact for Yo   5 Term 13 39w0d  7 lb (3.175 kg) F Vag-Spont None N MIKE   4 Term 12 40w0d  7 lb 13 oz (3.544 kg) F Vag-Spont EPI N MIKE      Birth Comments: NO GDM - pitocin induction - AROM   3 Term 11 39w1d  7 lb (3.175 kg) F Vag-Spont

## 2020-10-12 ENCOUNTER — HOSPITAL ENCOUNTER (OUTPATIENT)
Age: 35
Setting detail: SPECIMEN
Discharge: HOME | End: 2020-10-12
Payer: COMMERCIAL

## 2020-10-12 ENCOUNTER — INITIAL PRENATAL (OUTPATIENT)
Dept: OBGYN CLINIC | Age: 35
End: 2020-10-12
Payer: COMMERCIAL

## 2020-10-12 VITALS
HEART RATE: 82 BPM | DIASTOLIC BLOOD PRESSURE: 72 MMHG | SYSTOLIC BLOOD PRESSURE: 114 MMHG | BODY MASS INDEX: 38.61 KG/M2 | WEIGHT: 232 LBS

## 2020-10-12 LAB
AMPHETAMINE SCREEN URINE: NEGATIVE
BARBITURATE SCREEN URINE: NEGATIVE
BENZODIAZEPINE SCREEN, URINE: NEGATIVE
BILIRUBIN URINE: NEGATIVE
BUPRENORPHINE URINE: NORMAL
CANNABINOID SCREEN URINE: NEGATIVE
COCAINE METABOLITE, URINE: NEGATIVE
COLOR: YELLOW
COMMENT UA: ABNORMAL
GLUCOSE URINE: NEGATIVE
KETONES, URINE: NEGATIVE
LEUKOCYTE ESTERASE, URINE: ABNORMAL
MDMA URINE: NORMAL
METHADONE SCREEN, URINE: NEGATIVE
METHAMPHETAMINE, URINE: NORMAL
NITRITE, URINE: NEGATIVE
OPIATES, URINE: NEGATIVE
OXYCODONE SCREEN URINE: NEGATIVE
PH UA: 6 (ref 5–8)
PHENCYCLIDINE, URINE: NEGATIVE
PROPOXYPHENE, URINE: NORMAL
PROTEIN UA: ABNORMAL
SPECIFIC GRAVITY UA: 1.02 (ref 1–1.03)
TEST INFORMATION: NORMAL
TRICYCLIC ANTIDEPRESSANTS, UR: NORMAL
TURBIDITY: ABNORMAL
URINE HGB: ABNORMAL
UROBILINOGEN, URINE: NORMAL

## 2020-10-12 PROCEDURE — 4004F PT TOBACCO SCREEN RCVD TLK: CPT | Performed by: CLINICAL NURSE SPECIALIST

## 2020-10-12 PROCEDURE — G8427 DOCREV CUR MEDS BY ELIG CLIN: HCPCS | Performed by: CLINICAL NURSE SPECIALIST

## 2020-10-12 PROCEDURE — 99213 OFFICE O/P EST LOW 20 MIN: CPT | Performed by: CLINICAL NURSE SPECIALIST

## 2020-10-12 PROCEDURE — 59899 UNLISTED PX MAT CARE&DLVR: CPT | Performed by: CLINICAL NURSE SPECIALIST

## 2020-10-12 PROCEDURE — G8417 CALC BMI ABV UP PARAM F/U: HCPCS | Performed by: CLINICAL NURSE SPECIALIST

## 2020-10-12 PROCEDURE — G8484 FLU IMMUNIZE NO ADMIN: HCPCS | Performed by: CLINICAL NURSE SPECIALIST

## 2020-10-12 NOTE — PROGRESS NOTES
Ami Gant is a 30 y.o. female 13w5d         OB History    Para Term  AB Living   7 4 5 0 1 5   SAB TAB Ectopic Molar Multiple Live Births   1 0 0   0 6      # Outcome Date GA Lbr Randy/2nd Weight Sex Delivery Anes PTL Lv   7 Current            6 Term 09/01/15 40w4d  8 lb 8 oz (3.856 kg) M Vag-Spont EPI  MIKE      Birth Comments: Education information given to mother and she verbalizes understanding about the following:  Hour for [employer]. Patient Safety Education. Infant security including the four band system and the HUGS system. Skin to Skin Contact for Yo   5 Term 13 39w0d  7 lb (3.175 kg) F Vag-Spont None N MIKE   4 Term 12 40w0d  7 lb 13 oz (3.544 kg) F Vag-Spont EPI N MIKE      Birth Comments: NO GDM - pitocin induction - AROM   3 Term 11 39w1d  7 lb (3.175 kg) F Vag-Spont EPI N MIKE      Birth Comments: GDM - insulin control   2 Term 09 40w3d  7 lb 8 oz (3.402 kg) M Vag-Spont EPI N MIKE      Birth Comments: GDM - diet controled   1 SAB     U    DEC      Birth Comments: 1 st trimester incomplete ab followed by D&C               Blood pressure 114/72, pulse 82, weight 232 lb (105.2 kg), last menstrual period 01/10/2020, not currently breastfeeding. The patient was seen and evaluated. There was Positive fetal movements. No contractions or leakage of fluid. Signs and symptoms of  labor as well as labor were reviewed. A Quad Screen was ordered for a 15-20 week gestational age window. Dates were reviewed with the patient. An 18-22 week anatomy ultrasound has been ordered. The patient will return to the office for her next visit in 2 weeks. See antepartum flow sheet.      Patient Active Problem List    Diagnosis Date Noted    Varicose veins of both lower extremities with pain 10/28/2019    BMI 35.0-35.9,adult 10/28/2019    Hepatomegaly 08/15/2019    S/P hernia repair     Umbilical hernia     Morbid obesity with BMI of 40.0-44.9, adult ([facility] [address] 2015    Right hip pain 2012    ABC (aneurysmal bone cyst) 2012        Diagnosis Orders   1. HRP (high risk pregnancy), second trimester  Glucose tolerance, 1 hour   2. 13 weeks gestation of pregnancy     3. Genetic screening  Maternal Serum Screen Quad   4. Screen for STD (sexually transmitted disease)  Chlamydia Trachomatis & Neisseria gonorrhoeae (GC) by amplified detection   5. Cervical cancer screening  PAP SMEAR   Continue prenatal vitamins, increase water intake and frequent rest periods as needed. 13w5d Prenatal history and OB education, including milestones throughout the pregnancy, vaccinations recommended while pregnant, any risk factors that may cause the patient to become high risk requiring  testing, and any viruses that may cause complications or fetal anomalies was completed. Total time spent with patient was 20 minutes face-to-face.     Electronically signed by: Devin Byrne CNP

## 2020-10-13 LAB
-: ABNORMAL
ABO/RH: NORMAL
ABSOLUTE EOS #: 0.17 K/UL (ref 0–0.44)
ABSOLUTE IMMATURE GRANULOCYTE: 0.03 K/UL (ref 0–0.3)
ABSOLUTE LYMPH #: 1.88 K/UL (ref 1.1–3.7)
ABSOLUTE MONO #: 0.55 K/UL (ref 0.1–1.2)
AMORPHOUS: ABNORMAL
ANTIBODY SCREEN: NEGATIVE
BACTERIA: ABNORMAL
BASOPHILS # BLD: 0 % (ref 0–2)
BASOPHILS ABSOLUTE: <0.03 K/UL (ref 0–0.2)
CASTS UA: ABNORMAL /LPF (ref 0–2)
CRYSTALS, UA: ABNORMAL /HPF
CULTURE: NORMAL
DIFFERENTIAL TYPE: ABNORMAL
EOSINOPHILS RELATIVE PERCENT: 2 % (ref 1–4)
EPITHELIAL CELLS UA: ABNORMAL /HPF (ref 0–5)
GLUCOSE ADMINISTRATION: ABNORMAL
GLUCOSE TOLERANCE SCREEN 50G: 178 MG/DL (ref 70–135)
HCT VFR BLD CALC: 39.5 % (ref 36.3–47.1)
HEMOGLOBIN: 13 G/DL (ref 11.9–15.1)
HEPATITIS B SURFACE ANTIGEN: NONREACTIVE
HIV AG/AB: NONREACTIVE
IMMATURE GRANULOCYTES: 0 %
LYMPHOCYTES # BLD: 19 % (ref 24–43)
Lab: NORMAL
MCH RBC QN AUTO: 32.7 PG (ref 25.2–33.5)
MCHC RBC AUTO-ENTMCNC: 32.9 G/DL (ref 28.4–34.8)
MCV RBC AUTO: 99.2 FL (ref 82.6–102.9)
MONOCYTES # BLD: 5 % (ref 3–12)
MUCUS: ABNORMAL
NRBC AUTOMATED: 0 PER 100 WBC
OTHER OBSERVATIONS UA: ABNORMAL
PDW BLD-RTO: 12.9 % (ref 11.8–14.4)
PLATELET # BLD: 237 K/UL (ref 138–453)
PLATELET ESTIMATE: ABNORMAL
PMV BLD AUTO: 9.9 FL (ref 8.1–13.5)
RBC # BLD: 3.98 M/UL (ref 3.95–5.11)
RBC # BLD: ABNORMAL 10*6/UL
RBC UA: ABNORMAL /HPF (ref 0–2)
RENAL EPITHELIAL, UA: ABNORMAL /HPF
RUBV IGG SER QL: 129.7 IU/ML
SEG NEUTROPHILS: 74 % (ref 36–65)
SEGMENTED NEUTROPHILS ABSOLUTE COUNT: 7.53 K/UL (ref 1.5–8.1)
SICKLE CELL SCREEN: NEGATIVE
SPECIMEN DESCRIPTION: NORMAL
T. PALLIDUM, IGG: NONREACTIVE
TRICHOMONAS: ABNORMAL
TSH SERPL DL<=0.05 MIU/L-ACNC: 0.73 MIU/L (ref 0.3–5)
WBC # BLD: 10.2 K/UL (ref 3.5–11.3)
WBC # BLD: ABNORMAL 10*3/UL
WBC UA: ABNORMAL /HPF (ref 0–5)
YEAST: ABNORMAL

## 2020-10-13 RX ORDER — NITROFURANTOIN 25; 75 MG/1; MG/1
100 CAPSULE ORAL 2 TIMES DAILY
Qty: 20 CAPSULE | Refills: 0 | Status: SHIPPED | OUTPATIENT
Start: 2020-10-13 | End: 2020-10-14

## 2020-10-14 RX ORDER — NITROFURANTOIN 25; 75 MG/1; MG/1
100 CAPSULE ORAL 2 TIMES DAILY
Qty: 20 CAPSULE | Refills: 0 | Status: SHIPPED | OUTPATIENT
Start: 2020-10-14 | End: 2020-10-24

## 2020-10-15 ENCOUNTER — HOSPITAL ENCOUNTER (OUTPATIENT)
Age: 35
Discharge: HOME | End: 2020-10-15
Payer: COMMERCIAL

## 2020-10-15 LAB
3 HR GLUCOSE: 103 MG/DL (ref 65–139)
AMOUNT GLUCOSE GIVEN: 100 G
GLUCOSE FASTING: 98 MG/DL (ref 65–94)
GLUCOSE TOLERANCE TEST 1 HOUR: 200 MG/DL (ref 65–179)
GLUCOSE TOLERANCE TEST 2 HOUR: 197 MG/DL (ref 65–154)

## 2020-10-15 PROCEDURE — 82951 GLUCOSE TOLERANCE TEST (GTT): CPT

## 2020-10-15 PROCEDURE — 36415 COLL VENOUS BLD VENIPUNCTURE: CPT

## 2020-10-15 PROCEDURE — 82952 GTT-ADDED SAMPLES: CPT

## 2020-10-16 RX ORDER — GLUCOSAMINE HCL/CHONDROITIN SU 500-400 MG
CAPSULE ORAL
Qty: 120 STRIP | Refills: 5 | Status: SHIPPED | OUTPATIENT
Start: 2020-10-16 | End: 2021-04-19 | Stop reason: ALTCHOICE

## 2020-10-16 RX ORDER — LANCETS 30 GAUGE
1 EACH MISCELLANEOUS 4 TIMES DAILY
Qty: 200 EACH | Refills: 0 | Status: SHIPPED | OUTPATIENT
Start: 2020-10-16 | End: 2021-04-19 | Stop reason: ALTCHOICE

## 2020-10-16 NOTE — PROGRESS NOTES
Referral sent to [facility] and prescription for glucometer and supplies sent to [facility] aid per Bethel Coronel CNP verbal orders.

## 2020-10-19 ENCOUNTER — TELEPHONE (OUTPATIENT)
Dept: PERINATAL CARE | Age: 35
End: 2020-10-19

## 2020-10-19 NOTE — TELEPHONE ENCOUNTER
Glucometer, Test Strips, Lancets and alcohol swabs Testing four times daily. One month supply x three refills. Script called into [facility] at [employer]  [phone].

## 2020-10-20 ENCOUNTER — TELEPHONE (OUTPATIENT)
Dept: OBGYN CLINIC | Age: 35
End: 2020-10-20

## 2020-10-20 NOTE — TELEPHONE ENCOUNTER
PA was needed for pt to get her Glucose meter. PA was filled out and faxed over to [employer].  PA pending

## 2020-10-21 ENCOUNTER — HOSPITAL ENCOUNTER (OUTPATIENT)
Dept: DIABETES SERVICES | Age: 35
Setting detail: THERAPIES SERIES
Discharge: HOME | End: 2020-10-21
Payer: COMMERCIAL

## 2020-10-21 PROCEDURE — G0108 DIAB MANAGE TRN  PER INDIV: HCPCS

## 2020-10-21 NOTE — PROGRESS NOTES
Gestational Diabetes Education Progress Note - PHONE  This visit is a TELEPHONE encounter (During ACCLZ-45 public health emergency). Pursuant to the emergency declaration under the [facility], [address] [place] [address] authority and the [employer] Act, this TELEPHONE Visit was conducted with patient's (and/or legal guardian's) consent, to reduce the patient's risk of exposure to COVID-19 and provide necessary medical care. The patient (and/or legal guardian) has also been advised to contact this office for worsening conditions or problems, and seek emergency medical treatment and/or call 911 if deemed necessary. Patient identification was verified at the start of the visit: Yes    Total time spent for this encounter: 1 hour    Services were provided through a video synchronous discussion virtually to substitute for in-person clinic visit. Patient and provider were located at their individual home/office. Assessment of learning needs and self care was done - see Gestational Diabetes Screening          Teaching provided at this session included:   _X__ Definition of gestational diabetes    _X_ Risk factors   _X__ Effects on pregnancy       _ X_ Management   _X__ Self-monitoring of blood sugar (FBS and 2 hrs postprandial)   _X__ Blood sugar targets FBS 65-90 and <120 2 hrs postprandial)   _X__  Role of Insulins and use of injection methods  _x__ Pen   __x_ Travis Carbon ( discussed in general )    Patient did use insulin with prior pregnancy    _x___Hyperglycemia  _x__ Hypoglycemia and treatment           Meal planning:   _X_  Avoid fruit juice and sugary beverages.               _X_  Aim to eat small frequent meals and snacks. (ie., 3 + 3)                _X__  Eat balanced meals according to divided dinner plate sample            Planned follow-up:    _x_  Another appointment /  phone call support has been scheduled for RD on - one month 11/23/2020             _X_ Patient is to report blood glucose test results to physician as directed by  prescribing physician. Resource materials provided via mail prior to call includes:   Gestational Diabetes Mellitus ( GDM) toolkit form [place] gestational diabetes postpartum care learning collaborative 2018. Gestational diabetes handout from [facility] jan 2016  \"Simple Guidelines for meal planning with gestational diabetes\" handout 3 meals and 3 snacks  SMBG sheets to fax back to [facility] weekly  BD  healthy injection site selection and rotation with 6 mm insulin syringe and 4 mm pen needle  Did you have gestational diabetes when you were pregnant? Handout from [facility]  April 2014      Patient has set goal for practice of diabetes self management :  SMBG- check fasting and 2 hours PP, eat 3 meals and 3 snacks/day, avoid sugary beverages. Patient stated has use of a home BG meter and has /  has not started to test BG. Self report BG are fasting in last 3 days 66 -  72 - 80 - post meal BG bit higher 143 - 139 and 92 ( prior to education on CHO controlled meals)   ID that patient will need cut back on morning coffee with flavored creamers. Patient stated understanding of role of insulin in care of gestational diabetes and if needs support to start to use insulin follow up care will be planned.     Luis Mcallister RN CDE

## 2020-10-27 ENCOUNTER — ROUTINE PRENATAL (OUTPATIENT)
Dept: OBGYN CLINIC | Age: 35
End: 2020-10-27
Payer: COMMERCIAL

## 2020-10-27 ENCOUNTER — HOSPITAL ENCOUNTER (OUTPATIENT)
Age: 35
Setting detail: SPECIMEN
Discharge: HOME | End: 2020-10-27
Payer: COMMERCIAL

## 2020-10-27 VITALS
BODY MASS INDEX: 39.44 KG/M2 | HEART RATE: 77 BPM | DIASTOLIC BLOOD PRESSURE: 70 MMHG | SYSTOLIC BLOOD PRESSURE: 115 MMHG | WEIGHT: 237 LBS

## 2020-10-27 PROBLEM — O24.319 PREGESTATIONAL DIABETES MELLITUS, MODIFIED WHITE CLASS B: Status: ACTIVE | Noted: 2020-10-27

## 2020-10-27 PROBLEM — Z23 NEED FOR VACCINATION: Status: ACTIVE | Noted: 2020-10-27

## 2020-10-27 PROBLEM — Z3A.15 15 WEEKS GESTATION OF PREGNANCY: Status: ACTIVE | Noted: 2020-10-27

## 2020-10-27 PROBLEM — Z86.32 PREVIOUS GESTATIONAL DIABETES MELLITUS, ANTEPARTUM, SECOND TRIMESTER: Status: ACTIVE | Noted: 2020-10-27

## 2020-10-27 PROBLEM — O09.292 PREVIOUS GESTATIONAL DIABETES MELLITUS, ANTEPARTUM, SECOND TRIMESTER: Status: ACTIVE | Noted: 2020-10-27

## 2020-10-27 PROCEDURE — 90688 IIV4 VACCINE SPLT 0.5 ML IM: CPT | Performed by: SPECIALIST

## 2020-10-27 PROCEDURE — G8417 CALC BMI ABV UP PARAM F/U: HCPCS | Performed by: SPECIALIST

## 2020-10-27 PROCEDURE — G8427 DOCREV CUR MEDS BY ELIG CLIN: HCPCS | Performed by: SPECIALIST

## 2020-10-27 PROCEDURE — 99213 OFFICE O/P EST LOW 20 MIN: CPT | Performed by: SPECIALIST

## 2020-10-27 PROCEDURE — G8482 FLU IMMUNIZE ORDER/ADMIN: HCPCS | Performed by: SPECIALIST

## 2020-10-27 PROCEDURE — 3046F HEMOGLOBIN A1C LEVEL >9.0%: CPT | Performed by: SPECIALIST

## 2020-10-27 PROCEDURE — 2022F DILAT RTA XM EVC RTNOPTHY: CPT | Performed by: SPECIALIST

## 2020-10-27 PROCEDURE — 4004F PT TOBACCO SCREEN RCVD TLK: CPT | Performed by: SPECIALIST

## 2020-10-27 RX ORDER — DEXTROSE 4 G
TABLET,CHEWABLE ORAL
COMMUNITY
Start: 2020-10-19 | End: 2022-09-08

## 2020-10-27 NOTE — PROGRESS NOTES
Patient was given FLU in the Left Deltoid. NDC# [id number]  LOT# [id number]   Exp date- 06/30/21   Patient's last injection was n/a. Patient's last annual exam was on n/a. Patient tolerated well without difficulty.

## 2020-10-27 NOTE — PROGRESS NOTES
Kathy Galaviz is a 30 y.o. female 15w6d        OB History    Para Term  AB Living   7 4 5 0 1 5   SAB TAB Ectopic Molar Multiple Live Births   1 0 0   0 6      # Outcome Date GA Lbr Randy/2nd Weight Sex Delivery Anes PTL Lv   7 Current            6 Term 09/01/15 40w4d  8 lb 8 oz (3.856 kg) M Vag-Spont EPI  MIKE      Birth Comments: Education information given to mother and she verbalizes understanding about the following:  Hour for [employer]. Patient Safety Education. Infant security including the four band system and the HUGS system. Skin to Skin Contact for Yo   5 Term 13 39w0d  7 lb (3.175 kg) F Vag-Spont None N MIKE   4 Term 12 40w0d  7 lb 13 oz (3.544 kg) F Vag-Spont EPI N MIKE      Birth Comments: NO GDM - pitocin induction - AROM   3 Term 11 39w1d  7 lb (3.175 kg) F Vag-Spont EPI N MIKE      Birth Comments: GDM - insulin control   2 Term 09 40w3d  7 lb 8 oz (3.402 kg) M Vag-Spont EPI N MIKE      Birth Comments: GDM - diet controled   1 SAB     U    DEC      Birth Comments: 1 st trimester incomplete ab followed by [facility]        Chief Complaint   Patient presents with    Routine Prenatal Visit     patient is here for prenatal visit she is 15w6d    Gestational Diabetes            Blood pressure 115/70, pulse 77, weight 237 lb (107.5 kg), last menstrual period 01/10/2020, not currently breastfeeding. The patient was seen and evaluated. There was N/A fetal movements. No contractions or leakage of fluid. Signs and symptoms of  labor as well as labor were reviewed. Dates were reviewed with the patient. The patient will return to the office for her next visit in 1 week. See antepartum flow sheet.      GENERAL EXAM:  HEENT: Normal    Thyroid: Normal  Lymph Nodes: Normal  Neurological: Normal  Skin: Normal  Heart:Normal   Lungs: Normal   Breasts: Normal   Abdomen: Normal   Extremities: Normal   Musculoskeletal: Normal    PELVIC EXAM:  Vulva: Normal  Vagina: Normal  Cervix: Normal  Uterus: Enlarged 15 Gestational Weeks  Adnexa: Normal  Rectum: Normal  Spines: Average  Sacrum: Concave  Subpubic Arch: Normal  Diag. Conjugate: Not Done  Length (cm): N/A  Pelvic Type: Gynecoid      Patient Active Problem List    Diagnosis Date Noted    Need for vaccination 10/27/2020    15 weeks gestation of pregnancy 10/27/2020    Previous gestational diabetes mellitus, antepartum, second trimester 10/27/2020    Pregestational diabetes mellitus, modified White class B 10/27/2020    Varicose veins of both lower extremities with pain 10/28/2019    BMI 35.0-35.9,adult 10/28/2019    Hepatomegaly 08/15/2019    S/P hernia repair 94/67/5777    Umbilical hernia 74/87/3543    Morbid obesity with BMI of 40.0-44.9, adult ([facility] [address] 05/27/2015    Right hip pain 09/24/2012    ABC (aneurysmal bone cyst) 09/24/2012        Diagnosis Orders   1. Need for vaccination  INFLUENZA, QUADV, 0.5ML, 6 MO AND OLDER, IM, MDV, (FLUZONE QUADV)   2. 15 weeks gestation of pregnancy  PAP SMEAR    Chlamydia Trachomatis & Neisseria gonorrhoeae (GC) by amplified detection   3. Morbid obesity with BMI of 40.0-44.9, adult ([address]     4. Pregestational diabetes mellitus, modified White class B       Patient with gestational diabetes. She was advised to keep appointment with MFM as scheduled. Fetal heart rate auscultated at 152 bpm and fundal height was not measured today due to early gestational age. Pap and cultures done today. Patient advised to continue taking prenatal vitamins and to rest as necessary. Lu Vivas am scribing for, and in the presence of Dr. John Sheppard. Electronically signed by: Carmell Romberg 10/27/20 11:47 AM   I agree to the above documentation placed by my scribe Carmell Romberg. I reviewed the scribe's note and agree with the documented findings and plan of care. Any areas of disagreement are noted on the chart.    I have personally evaluated this patient. Additional findings are as noted. I agree with the chief complaint, past medical history, past surgical history, allergies, medications, social and family history as documented unless otherwise noted below.      Electronically signed by Jamie Harrington MD on 10/28/2020 at 3:53 AM

## 2020-10-28 PROBLEM — Z72.0 TOBACCO ABUSE: Status: ACTIVE | Noted: 2020-10-28

## 2020-10-28 ASSESSMENT — ENCOUNTER SYMPTOMS
SHORTNESS OF BREATH: 0
RESPIRATORY NEGATIVE: 1
APNEA: 0
DIARRHEA: 0
COUGH: 0
NAUSEA: 0
CHEST TIGHTNESS: 0
TROUBLE SWALLOWING: 0
EYE PAIN: 0
COLOR CHANGE: 0
CONSTIPATION: 0
ABDOMINAL PAIN: 0
VOMITING: 0
SORE THROAT: 0

## 2020-10-29 ENCOUNTER — HOSPITAL ENCOUNTER (OUTPATIENT)
Age: 35
Discharge: HOME | End: 2020-10-29
Payer: COMMERCIAL

## 2020-10-29 ENCOUNTER — OFFICE VISIT (OUTPATIENT)
Dept: FAMILY MEDICINE CLINIC | Age: 35
End: 2020-10-29
Payer: COMMERCIAL

## 2020-10-29 ENCOUNTER — ROUTINE PRENATAL (OUTPATIENT)
Dept: PERINATAL CARE | Age: 35
End: 2020-10-29
Payer: COMMERCIAL

## 2020-10-29 VITALS
DIASTOLIC BLOOD PRESSURE: 72 MMHG | HEIGHT: 65 IN | WEIGHT: 237 LBS | SYSTOLIC BLOOD PRESSURE: 118 MMHG | HEART RATE: 80 BPM | TEMPERATURE: 97 F | BODY MASS INDEX: 39.49 KG/M2 | RESPIRATION RATE: 20 BRPM

## 2020-10-29 VITALS
WEIGHT: 238.4 LBS | TEMPERATURE: 97.5 F | HEIGHT: 65 IN | DIASTOLIC BLOOD PRESSURE: 60 MMHG | OXYGEN SATURATION: 97 % | SYSTOLIC BLOOD PRESSURE: 120 MMHG | HEART RATE: 90 BPM | BODY MASS INDEX: 39.72 KG/M2

## 2020-10-29 PROBLEM — Z20.5 EXPOSURE TO HEPATITIS C: Status: ACTIVE | Noted: 2020-10-29

## 2020-10-29 PROBLEM — E78.5 DYSLIPIDEMIA: Status: ACTIVE | Noted: 2020-10-29

## 2020-10-29 PROBLEM — E55.9 VITAMIN D INSUFFICIENCY: Status: ACTIVE | Noted: 2020-10-29

## 2020-10-29 LAB
HBA1C MFR BLD: 5.8 %
HEMOGLOBIN: 12.7 G/DL (ref 11.9–15.1)

## 2020-10-29 PROCEDURE — G8417 CALC BMI ABV UP PARAM F/U: HCPCS | Performed by: FAMILY MEDICINE

## 2020-10-29 PROCEDURE — 76805 OB US >/= 14 WKS SNGL FETUS: CPT | Performed by: OBSTETRICS & GYNECOLOGY

## 2020-10-29 PROCEDURE — 2022F DILAT RTA XM EVC RTNOPTHY: CPT | Performed by: OBSTETRICS & GYNECOLOGY

## 2020-10-29 PROCEDURE — 83036 HEMOGLOBIN GLYCOSYLATED A1C: CPT | Performed by: FAMILY MEDICINE

## 2020-10-29 PROCEDURE — G8417 CALC BMI ABV UP PARAM F/U: HCPCS | Performed by: OBSTETRICS & GYNECOLOGY

## 2020-10-29 PROCEDURE — 85018 HEMOGLOBIN: CPT

## 2020-10-29 PROCEDURE — G8482 FLU IMMUNIZE ORDER/ADMIN: HCPCS | Performed by: FAMILY MEDICINE

## 2020-10-29 PROCEDURE — 4004F PT TOBACCO SCREEN RCVD TLK: CPT | Performed by: FAMILY MEDICINE

## 2020-10-29 PROCEDURE — 82105 ALPHA-FETOPROTEIN SERUM: CPT

## 2020-10-29 PROCEDURE — G8427 DOCREV CUR MEDS BY ELIG CLIN: HCPCS | Performed by: FAMILY MEDICINE

## 2020-10-29 PROCEDURE — 99243 OFF/OP CNSLTJ NEW/EST LOW 30: CPT | Performed by: OBSTETRICS & GYNECOLOGY

## 2020-10-29 PROCEDURE — G8427 DOCREV CUR MEDS BY ELIG CLIN: HCPCS | Performed by: OBSTETRICS & GYNECOLOGY

## 2020-10-29 PROCEDURE — G8482 FLU IMMUNIZE ORDER/ADMIN: HCPCS | Performed by: OBSTETRICS & GYNECOLOGY

## 2020-10-29 PROCEDURE — 99214 OFFICE O/P EST MOD 30 MIN: CPT | Performed by: FAMILY MEDICINE

## 2020-10-29 PROCEDURE — 36415 COLL VENOUS BLD VENIPUNCTURE: CPT

## 2020-10-29 RX ORDER — NEOMYCIN SULFATE, POLYMYXIN B SULFATE, BACITRACIN ZINC, HYDROCORTISONE 3.5; 10000; 400; 1 MG/G; [USP'U]/G; [USP'U]/G; MG/G
0.5 OINTMENT OPHTHALMIC 2 TIMES DAILY
Qty: 1 TUBE | Refills: 0 | Status: SHIPPED | OUTPATIENT
Start: 2020-10-29 | End: 2021-03-22 | Stop reason: ALTCHOICE

## 2020-10-29 ASSESSMENT — ENCOUNTER SYMPTOMS
EYE DISCHARGE: 1
EYE ITCHING: 1

## 2020-10-29 ASSESSMENT — VISUAL ACUITY: OU: 1

## 2020-10-29 NOTE — PROGRESS NOTES
Visit Information    Have you changed or started any medications since your last visit including any over-the-counter medicines, vitamins, or herbal medicines? no   Are you having any side effects from any of your medications? -  no  Have you stopped taking any of your medications? Is so, why? -  no    Have you seen any other physician or provider since your last visit? Yes - Records Obtained  Have you had any other diagnostic tests since your last visit? Yes - Records Obtained  Have you been seen in the emergency room and/or had an admission to a hospital since we last saw you? Yes - Records Obtained  Have you had your routine dental cleaning in the past 6 months? no    Have you activated your [employer] account? If not, what are your barriers?  Yes     Patient Care Team:  JUSTIN Veloz CNP as PCP - General (Family Medicine)  JUSTIN Urena CNP as PCP - [facility] Provider    Medical History Review  Past Medical, Family, and Social History reviewed and does contribute to the patient presenting condition    Health Maintenance   Topic Date Due    Varicella vaccine (1 of 2 - 2-dose childhood series) 08/30/1987    A1C test (Diabetic or Prediabetic)  09/12/2020    Cervical cancer screen  08/26/2022    DTaP/Tdap/Td vaccine (3 - Td) 06/18/2025    Flu vaccine  Completed    Pneumococcal 0-64 years Vaccine  Completed    HIV screen  Completed    Hepatitis A vaccine  Aged Out    Hepatitis B vaccine  Aged Out    Hib vaccine  Aged Out    Meningococcal (ACWY) vaccine  Aged Out

## 2020-10-29 NOTE — PROGRESS NOTES
[facility] PHYSICIANS  [facility]  [address] [facility]  [address] [address]  Dept: [phone]     10/29/2020   Kae Cueva (:  1985) is a 30 y.o. female,   Chief Complaint   Patient presents with    Weight Management    Gestational Diabetes    Eye Problem     left eye      Health Maintenance Due   Topic Date Due    Varicella vaccine (1 of 2 - 2-dose childhood series) 1987    A1C test (Diabetic or Prediabetic)  2020       HPI    Kae Cueva is a 30 y.o. female patient is an established patient. Patient has a history of prediabetes, dyslipidemia, hepatomegaly, varicose veins, vitamin D deficiency, smoking, and morbid obesity. Patient is 12 weeks pregnant, she has 5 kids 5 kids (age range from 6- 12 y/o)-patient is established with Dr. Arthur Robles  Patient had a recent 3-hour Glucola test which came back positive. Patient was referred to maternal-fetal medicine which she has an appointment within the next few days. Patient started checking her own glucose levels which have been pretty stable. Hemoglobin A1c today was 5.8%. She is not currently on any type of therapy she is doing some diet control. Patient had also noted a white patch on her left upper eyelid. She has had this for 2 weeks. She has been doing hot compresses without any success. Patient complains of some mild itching but no heavy discharge. No problems with her infections. No trauma reported also. Mor Ortiz  Patient's recent hemoglobin A1c below. Patient admits to Pregnancy. Patient denies any polyphagia, polydipsia, polyuria. We discussed the importance lifestyle changes such as cutting down food/drinks high in carbohydrates and regular exercise to avoid any progression on this condition. We are going to continue to monitor the [facility]. Lab Results   Component Value Date    LABA1C 5.8 10/29/2020      VARICOSE VEINS BOTH LEGS  Bilateral LE varicose veins. Worsening, uses compression stockings with mild relief. No numbness to both legs. Patient is obese, she also stands a lot on her job at [employer]. Patient was evaluated by Dr. Britta Benito. Venous reflux noted on both legs. Worse on the right. Patient is scheduled to get a procedure to correct her venous reflux on her left. She continues to wear the compression stockings until she got pregnant compression stockings. Patient wears a full compression stocking all the way to her waistline which she is not able to do because of the increasing girth on her abdomen to the pregnancy. Appointment set up this year. HEPATOMEGALY  Patient had an abdominal ultrasound to evaluate elevated liver enzymes. Mild enlargement of her liver was found. No other significant findings. Patient had a recent hepatitis panel which also came back negative. We will be rechecking her hepatic enzymes. Patient denies any current problems with abdominal pain, yellowing of the skin, however, she continues to gain weight. We are going to continue to monitor. Patient was encouraged to follow-up after birth so we can discuss other options of weight control and improve fatty liver. MORBID OBESITY  Patient's Body mass index is 39.67 kg/m². BMI is elevated. This might be related to the increasing weight due to the pregnancy. We are going to continue to support her after giving birth perhaps start her on a weight management. Patient is very anxious about her weight gain. We also discussed about discussing contraception. She will be discussing this further with the gynecologist.  Wt Readings from Last 3 Encounters:   10/29/20 238 lb 6.4 oz (108.1 kg)   10/27/20 237 lb (107.5 kg)   10/12/20 232 lb (105.2 kg)     SMOKING  Patient had cut down on her smoking from half pack a day to 5 to 6 cigarettes a day. Patient was encouraged to quit smoking due to the pregnancy to her health. Patient continues to try.   She said that she will try to quit on her own. DYSLIPIDEMIA  Milagro Marcus is not currently is not on any statin. The patient is not known to have coexisting coronary artery disease. ASCVD Score below. The 10-year CVD risk score (Maydaino, et al., 2008) is: 2.7%    Values used to calculate the score:      Age: 30 years      Sex: Female      Diabetic: No      Tobacco smoker: Yes      Systolic Blood Pressure: 370 mmHg      Is BP treated: No      HDL Cholesterol: 37 mg/dL      Total Cholesterol: 166 mg/dL  Lab Results   Component Value Date/Time    CHOL 188 06/23/2012 02:02 PM    HDL 37 (L) 09/12/2019 04:17 PM    LDLCHOLESTEROL 112 09/12/2019 04:17 PM    TRIG 180 (H) 06/23/2012 02:02 PM    CHOLHDLRATIO 4.5 09/12/2019 04:17 PM    VLDL NOT REPORTED 09/12/2019 04:17 PM     VITAMIN D  Patient has a known Vitamin D Deficiency. she had a course of supplementation for 12 weeks. she is due to get levels check. We continue to discuss ways to manage this condition. We also discussed ways to improve the levels. Such as learning food groups that are high in Vitamin D. We also discuss that sun exposure is beneficial however, too much sun and sun damage can potentially result in skin cancer. Lab Results   Component Value Date/Time    VITD25 21.6 (L) 09/12/2019 04:17 PM      Margie Hernandez is due for Varicella vaccine. her  indication is age over 48. Benefits of getting immunization against shingles discussed, and patient is agreeable. she  denies side effects to prior immunizations. .        No data recorded   Ready to quit: Yes  Counseling given: Yes    Review of Systems   Constitutional: Negative. Negative for chills, fatigue and fever. Anxiety   HENT: Negative for congestion, ear pain, sore throat and trouble swallowing. Eyes: Positive for discharge and itching. Negative for pain and visual disturbance. Respiratory: Negative. Negative for apnea, cough, chest tightness and shortness of breath. Cardiovascular: Negative.     Gastrointestinal: Negative for abdominal pain, constipation, diarrhea, nausea and vomiting. Endocrine: Negative for cold intolerance and heat intolerance. Genitourinary: Negative for difficulty urinating, dysuria, frequency and genital sores. Musculoskeletal: Positive for arthralgias. Negative for gait problem and myalgias. Varicose veins on both legs, increasing in size. Has pain. Uses compression stockings. Skin: Negative for color change and rash. Neurological: Negative for weakness, light-headedness and headaches. Mild claudication     Psychiatric/Behavioral: Negative for agitation, behavioral problems, decreased concentration and sleep disturbance. The patient is nervous/anxious. Prior to Visit Medications    Medication Sig Taking? Authorizing Provider   neomycin-bacitracin-polymyxin-hydrocortisone (CORTISPORIN) 1 % ophthalmic ointment Place 0.5 inches into the left eye 2 times daily Yes JUSTIN Urena CNP   RA Alcohol Swabs 70 % PADS USE AS DIRECTED four times a day Yes Historical Provider, MD   blood glucose monitor kit and supplies Dispense sufficient amount for indicated testing frequency plus additional to accommodate PRN testing needs. Dispense all needed supplies to include: monitor, strips, lancing device, lancets, control solutions, alcohol swabs. Yes JUSTIN Carnes CNP   blood glucose monitor strips Test 4 times a day & as needed for symptoms of irregular blood glucose. Dispense sufficient amount for indicated testing frequency plus additional to accommodate PRN testing needs.  Yes JUSTIN Carnes CNP   Lancets MISC 1 each by Does not apply route 4 times daily Yes JUSTIN Carnes CNP   promethazine (PHENERGAN) 25 MG tablet Take 1 tablet by mouth every 8 hours as needed for Nausea Yes JUSTIN Carnes CNP   Prenatal MV-Min-Fe Fum-FA-DHA (PRENATAL+DHA) 28-0.975 & 200 MG MISC Take 1 tablet by mouth daily Yes JUSTIN Carnes CNP      Social History Tobacco Use    Smoking status: Current Every Day Smoker     Packs/day: 0.25     Years: 17.00     Pack years: 4.25     Types: Cigarettes    Smokeless tobacco: Never Used   Substance Use Topics    Alcohol use: No      Vitals:    10/29/20 0800   BP: 120/60   Pulse: 90   Temp: 97.5 °F (36.4 °C)   TempSrc: Temporal   SpO2: 97%   Weight: 238 lb 6.4 oz (108.1 kg)   Height: 5' 5\" (1.651 m)     Estimated body mass index is 39.67 kg/m² as calculated from the following:    Height as of this encounter: 5' 5\" (1.651 m). Weight as of this encounter: 238 lb 6.4 oz (108.1 kg). Physical Exam  Vitals signs and nursing note reviewed. Constitutional:       Appearance: She is well-developed. She is obese. HENT:      Head: Normocephalic. Right Ear: External ear normal.      Left Ear: External ear normal.      Nose: Nose normal.   Eyes:      General: Lids are normal. Lids are everted, no foreign bodies appreciated. Vision grossly intact. Gaze aligned appropriately. No scleral icterus. Left eye: Hordeolum present. Conjunctiva/sclera: Conjunctivae normal.      Pupils: Pupils are equal, round, and reactive to light. Neck:      Musculoskeletal: Normal range of motion and neck supple. Thyroid: No thyromegaly. Vascular: No JVD. Cardiovascular:      Rate and Rhythm: Normal rate and regular rhythm. Heart sounds: Normal heart sounds. No murmur. No friction rub. No gallop. Pulmonary:      Effort: Pulmonary effort is normal. No respiratory distress. Breath sounds: Normal breath sounds. No wheezing or rales. Abdominal:      General: Bowel sounds are normal. There is no distension. Palpations: Abdomen is soft. Tenderness: There is no abdominal tenderness. There is no guarding or rebound. Hernia: No hernia is present. Musculoskeletal: Normal range of motion. Right upper leg: She exhibits no tenderness (varicose veins) and no swelling.       Comments: Varicose veins both LE.     Lymphadenopathy:      Cervical: No cervical adenopathy. Skin:     General: Skin is warm and dry. Capillary Refill: Capillary refill takes less than 2 seconds. Findings: No rash. Neurological:      Mental Status: She is alert and oriented to person, place, and time. Sensory: No sensory deficit. Coordination: Coordination normal.   Psychiatric:         Mood and Affect: Mood is anxious. Behavior: Behavior normal.         Thought Content: Thought content normal.         Judgment: Judgment normal.       Most recent labs reviewed with patient, and all questions answered. Lab Results   Component Value Date    WBC 10.2 10/12/2020    HGB 13.0 10/12/2020    HCT 39.5 10/12/2020    MCV 99.2 10/12/2020     10/12/2020     Lab Results   Component Value Date     07/30/2019    K 4.0 07/30/2019     07/30/2019    CO2 20 07/30/2019    BUN 12 07/30/2019    CREATININE 0.48 07/30/2019    GLUCOSE 178 10/12/2020    GLUCOSE 93 07/30/2019    GLUCOSE 76 01/11/2012    CALCIUM 8.9 07/30/2019      Lab Results   Component Value Date    ALT 13 07/30/2019    AST 13 07/30/2019    ALKPHOS 56 07/30/2019    BILITOT 0.18 (L) 07/30/2019     Lab Results   Component Value Date    TSH 0.73 10/12/2020     Lab Results   Component Value Date    CHOL 188 06/23/2012     Lab Results   Component Value Date    TRIG 180 (H) 06/23/2012     Lab Results   Component Value Date    HDL 37 (L) 09/12/2019    HDL 45 06/23/2012     Lab Results   Component Value Date    LDLCHOLESTEROL 112 09/12/2019    LDLCHOLESTEROL 107 (H) 06/23/2012     Lab Results   Component Value Date    CHOLHDLRATIO 4.5 09/12/2019    CHOLHDLRATIO 4.2 06/23/2012     Lab Results   Component Value Date    LABA1C 5.8 10/29/2020      No results found for: [id number]  No results found for: FOLATE  Lab Results   Component Value Date    VITD25 21.6 (L) 09/12/2019     ASSESSMENT/PLAN:  1.  Hordeolum internum of left upper eyelid  Worsening  Use eye cream  Continue hot packs  Advised to report for worsening symptoms    - neomycin-bacitracin-polymyxin-hydrocortisone (CORTISPORIN) 1 % ophthalmic ointment; Place 0.5 inches into the left eye 2 times daily  Dispense: 1 Tube; Refill: 0    2. Gestational diabetes mellitus (GDM) in first trimester, gestational diabetes method of control unspecified  Ongoing  Established maternal-fetal medicine  Diet controlled for now  We will continue to monitor Hemoglobin A1c   DISCUSSED and ADVISED TO:  Continue to check Glucose levels at home. Report increased and low levels as discussed. Decrease carbohydrates, sugary drinks, desserts in your diet. Exercise regularly, as tolerated. Try to lose weight.    - POCT glycosylated hemoglobin (Hb A1C)    3. 12 weeks gestation of pregnancy  Stable  Established with obstetrician    4. Dyslipidemia  Likely stable  Recheck lipids  - Lipid Panel; Future    5. Hepatomegaly  Likely stable  Recheck LFTs  - Hepatic Function Panel; Future    6. Varicose veins of both lower extremities with pain  Failure to Improve  Continue follow-up with vascular surgeon    7. Vitamin D insufficiency  Continue Vitamin D supplementation  DISCUSSED AND ADVISED TO:  Foods that contain a lot of vitamin D includes [place], tuna, and mackerel. Cheese, egg yolks, and beef liver have small amounts of vit D.  Milk, soy drinks, orange juice, yogurt, margarine, and some kinds of cereal have vitamin D added to them. Continue to use sunblock when out in the sun to prevent skin cancer.    - Vitamin D 25 Hydroxy; Future    8. Need for varicella vaccine  Recommended by CDC. No current infection. Denies previous adverse reaction to vaccination.      - Varicella Zoster Antibody, IgG; Future    9. Tobacco abuse  Cutting down on her own  Counseling given to quit smoking. Support offered. Hand out given. Educational material given        10.  Class 2 severe obesity due to excess calories with serious comorbidity and body mass index (BMI) of 39.0 to 39.9 in adult (HCC)  BMI increasing--due to pregnancy  DISCUSSED AND ADVISED TO:  Eat a low-fat and low carbohydrates diet. Avoid fried foods especially fast food. Choose healthier options for snacks. Have 5-6 servings of fruits and vegetables per day. Cut down on eating processed food. Add 30 minutes to 1 hour aerobic exercise for 3-4 days a week. Health Maintenance Due   Topic Date Due    Varicella vaccine (1 of 2 - 2-dose childhood series) 08/30/1987    A1C test (Diabetic or Prediabetic)  09/12/2020      Return for Chronic conditions, after giving birth, 30mins. Gayathri Dior received counseling on the following healthy behaviors: nutrition, exercise and medication adherence  Reviewed prior labs and health maintenance  Continue current medications, diet and exercise. Discussed use, benefit, and side effects of prescribed medications. Barriers to medication compliance addressed. Patient given educational materials - see patient instructions  Was a self-tracking handout given in paper form or via [employer]? Yes    Requested Prescriptions     Signed Prescriptions Disp Refills    neomycin-bacitracin-polymyxin-hydrocortisone (CORTISPORIN) 1 % ophthalmic ointment 1 Tube 0     Sig: Place 0.5 inches into the left eye 2 times daily       All patient questions answered. Patient voiced understanding. Quality Measures    Body mass index is 39.67 kg/m². Elevated. Weight control planned discussed Healthy diet and regular exercise. BP: 120/60 Blood pressure is normal. Treatment plan consists of No treatment change needed.     Lab Results   Component Value Date    LDLCHOLESTEROL 112 09/12/2019    (goal LDL reduction with dx if diabetes is 50% LDL reduction)      PHQ Scores 9/17/2020 8/15/2019   PHQ2 Score 0 0   PHQ9 Score 0 0     Interpretation of Total Score Depression Severity: 1-4 = Minimal depression, 5-9 = Mild depression, 10-14 = Moderate depression, 15-19 = Moderately severe depression, 20-27 = Severe depression   This note was completed by using the assistance of a speech-recognition program. However, inadvertent computerized transcription errors may be present. Although every effort was made to ensure accuracy, no guarantees can be provided that every mistake has been identified and corrected by editing   An electronic signature was used to authenticate this note.   --JUSTIN Samuel - CNP on 10/29/2020 at 8:56 AM

## 2020-10-29 NOTE — PATIENT INSTRUCTIONS
Patient Education        Styes and Chalazia: Care Instructions  Your Care Instructions     Styes and chalazia (say \"zjn-QXH-kaj-uh\") are both conditions that can cause swelling of the eyelid. A stye is an infection in the root of an eyelash. The infection causes a tender red lump on the edge of the eyelid. The infection can spread until the whole eyelid becomes red and inflamed. Styes usually break open, and a tiny amount of pus drains. They usually clear up on their own in about a week, but they sometimes need treatment with antibiotics. A chalazion is a lump or cyst in the eyelid (chalazion is singular; chalazia is plural). It is caused by swelling and inflammation of deep oil glands inside the eyelid. Chalazia are usually not infected. They can take a few months to heal.  If a chalazion becomes more swollen and painful or does not go away, you may need to have it drained by your doctor. Follow-up care is a key part of your treatment and safety. Be sure to make and go to all appointments, and call your doctor if you are having problems. It's also a good idea to know your test results and keep a list of the medicines you take. How can you care for yourself at home? · Do not rub your eyes. Do not squeeze or try to open a stye or chalazion. · To help a stye or chalazion heal faster:  ? Put a warm, moist compress on your eye for 5 to 10 minutes, 3 to 6 times a day. Heat often brings a stye to a point where it drains on its own. Keep in mind that warm compresses will often increase swelling a little at first.  ? Do not use hot water or heat a wet cloth in a microwave oven. The compress may get too hot and can burn the eyelid. · Always wash your hands before and after you use a compress or touch your eyes. · If the doctor gave you antibiotic drops or ointment, use the medicine exactly as directed. Use the medicine for as long as instructed, even if your eye starts to feel better.   · To put in eyedrops or ointment:  ? Tilt your head back, and pull your lower eyelid down with one finger. ? Drop or squirt the medicine inside the lower lid. ? Close your eye for 30 to 60 seconds to let the drops or ointment move around. ? Do not touch the ointment or dropper tip to your eyelashes or any other surface. · Do not wear eye makeup or contact lenses until the stye or chalazion heals. · Do not share towels, pillows, or washcloths while you have a stye. When should you call for help? Call your doctor now or seek immediate medical care if:    · You have pain in your eye.     · You have a change in vision or loss of vision.     · Redness and swelling get much worse. Watch closely for changes in your health, and be sure to contact your doctor if:    · Your stye does not get better in 1 week.     · Your chalazion does not start to get better after several weeks. Where can you learn more? Go to https://[employer]. org and sign in to your [employer] account. Enter M616 in the [employer] box to learn more about \"Styes and Chalazia: Care Instructions. \"     If you do not have an account, please click on the \"Sign Up Now\" link. Current as of: December 18, 2019               Content Version: 12.6  © 8250-6565 Healthwise, Incorporated. Care instructions adapted under license by [employer]. If you have questions about a medical condition or this instruction, always ask your healthcare professional. [place] [address] any warranty or liability for your use of this information.

## 2020-10-30 LAB
C TRACH DNA GENITAL QL NAA+PROBE: NEGATIVE
HPV SOURCE: NORMAL
HPV, GENOTYPE 16: NOT DETECTED
HPV, GENOTYPE 18: NOT DETECTED
HPV, HIGH RISK OTHER: NOT DETECTED
N. GONORRHOEAE DNA: NEGATIVE
SEND OUT REPORT: NORMAL
SPECIMEN DESCRIPTION: NORMAL
TEST NAME: NORMAL

## 2020-10-31 LAB
AFP INTERPRETATION: NORMAL
AFP MOM: 1.07
AFP SPECIMEN: NORMAL
AFP: 26 NG/ML
DATE OF BIRTH: NORMAL
DATING METHOD: NORMAL
DETERMINED BY: NORMAL
DIABETIC: NEGATIVE
DUE DATE: NORMAL
ESTIMATED DUE DATE: NORMAL
FAMILY HISTORY NTD: NEGATIVE
GESTATIONAL AGE: NORMAL
INSULIN REQ DIABETES: NO
LAST MENSTRUAL PERIOD: NORMAL
MATERNAL AGE AT EDD: 34.6 YR
MATERNAL WEIGHT: 237
MONOCHORIONIC TWINS: NORMAL
NUMBER OF FETUSES: NORMAL
PATIENT WEIGHT UNITS: NORMAL
PATIENT WEIGHT: NORMAL
RACE (MATERNAL): NORMAL
RACE: NORMAL
REPEAT SPECIMEN?: NEGATIVE
SMOKING: NORMAL
SMOKING: NORMAL
VALPROIC/CARBAMAZEP: NORMAL
ZZ NTE CLEAN UP: HISTORY: NO

## 2020-11-02 LAB — CYTOLOGY REPORT: NORMAL

## 2020-11-03 ENCOUNTER — ROUTINE PRENATAL (OUTPATIENT)
Dept: OBGYN CLINIC | Age: 35
End: 2020-11-03
Payer: COMMERCIAL

## 2020-11-03 VITALS
SYSTOLIC BLOOD PRESSURE: 100 MMHG | HEART RATE: 82 BPM | WEIGHT: 234 LBS | BODY MASS INDEX: 38.94 KG/M2 | DIASTOLIC BLOOD PRESSURE: 60 MMHG

## 2020-11-03 PROBLEM — O09.92 HIGH-RISK PREGNANCY IN SECOND TRIMESTER: Status: ACTIVE | Noted: 2020-11-03

## 2020-11-03 PROBLEM — Z3A.16 16 WEEKS GESTATION OF PREGNANCY: Status: ACTIVE | Noted: 2020-11-03

## 2020-11-03 PROCEDURE — 99213 OFFICE O/P EST LOW 20 MIN: CPT | Performed by: SPECIALIST

## 2020-11-03 PROCEDURE — G8427 DOCREV CUR MEDS BY ELIG CLIN: HCPCS | Performed by: SPECIALIST

## 2020-11-03 PROCEDURE — 4004F PT TOBACCO SCREEN RCVD TLK: CPT | Performed by: SPECIALIST

## 2020-11-03 PROCEDURE — G8482 FLU IMMUNIZE ORDER/ADMIN: HCPCS | Performed by: SPECIALIST

## 2020-11-03 PROCEDURE — G8417 CALC BMI ABV UP PARAM F/U: HCPCS | Performed by: SPECIALIST

## 2020-11-03 RX ORDER — ASPIRIN 81 MG/1
81 TABLET ORAL DAILY
COMMUNITY
End: 2021-04-15

## 2020-11-04 NOTE — PROGRESS NOTES
Mian Blakely is a 30 y.o. female 16w6d        OB History    Para Term  AB Living   7 4 5 0 1 5   SAB TAB Ectopic Molar Multiple Live Births   1 0 0   0 6      # Outcome Date GA Lbr Randy/2nd Weight Sex Delivery Anes PTL Lv   7 Current            6 Term 09/01/15 40w4d  8 lb 8 oz (3.856 kg) M Vag-Spont EPI  MIKE      Birth Comments: Education information given to mother and she verbalizes understanding about the following:  Hour for [employer]. Patient Safety Education. Infant security including the four band system and the HUGS system. Skin to Skin Contact for Yo   5 Term 13 39w0d  7 lb (3.175 kg) F Vag-Spont None N MIKE   4 Term 12 40w0d  7 lb 13 oz (3.544 kg) F Vag-Spont EPI N MIKE      Birth Comments: NO GDM - pitocin induction - AROM   3 Term 11 39w1d  7 lb (3.175 kg) F Vag-Spont EPI N MIKE      Birth Comments: GDM - insulin control   2 Term 09 40w3d  7 lb 8 oz (3.402 kg) M Vag-Spont EPI N MIKE      Birth Comments: GDM - diet controled   1 SAB     U    DEC      Birth Comments: 1 st trimester incomplete ab followed by [facility]        Chief Complaint   Patient presents with    High Risk Pregnancy            Blood pressure 100/60, pulse 82, weight 234 lb (106.1 kg), last menstrual period 01/10/2020, not currently breastfeeding. The patient was seen and evaluated. There was N/A fetal movements. No contractions or leakage of fluid. Signs and symptoms of  labor as well as labor were reviewed. Dates were reviewed with the patient. The patient will return to the office for her next visit in 1 week. See antepartum flow sheet. Patient Active Problem List    Diagnosis Date Noted    16 weeks gestation of pregnancy 2020    High-risk pregnancy in second trimester 2020    Dyslipidemia 10/29/2020    Vitamin D insufficiency 10/29/2020    Exposure to hepatitis C 10/29/2020      [facility]'s ED on 11/10/2017:  With a chief complaint of wanting to be tested for hepatitis C. Patient states the father of her children who she is sexually active with has recently been diagnosed with hepatitis C. She is unsure if he uses IV drugs currently.  Tobacco abuse 10/28/2020    Need for vaccination 10/27/2020    15 weeks gestation of pregnancy 10/27/2020    Hx GDMA1 and 2 10/27/2020    Pregestational diabetes mellitus, modified White class B 10/27/2020    Varicose veins of both lower extremities with pain 10/28/2019    BMI 35.0-35.9,adult 10/28/2019    Hepatomegaly 08/15/2019    S/P hernia repair 21/85/4048     Umbilical hernia repair WITH mesh 4/8128      Umbilical hernia 27/53/8533    Right hip pain 09/24/2012    ABC (aneurysmal bone cyst) 09/24/2012    Diet controlled gestational diabetes mellitus (GDM) in second trimester         Diagnosis Orders   1. 16 weeks gestation of pregnancy     2. Diet controlled gestational diabetes mellitus (GDM) in second trimester     3. High-risk pregnancy in second trimester       Patient doing well. Fetal heart rate auscultated at 150 bpm and fundal height is 16 cm. today. Patient advised to continue taking prenatal vitamins and to rest as necessary. Ryan Wang am scribing for, and in the presence of Dr. Luis Frazier. Electronically signed by: Otilio Lew 11/8/20 7:37 AM   I agree to the above documentation placed by my scribe Otilio Lew. I reviewed the scribe's note and agree with the documented findings and plan of care. Any areas of disagreement are noted on the chart. I have personally evaluated this patient. Additional findings are as noted. I agree with the chief complaint, past medical history, past surgical history, allergies, medications, social and family history as documented unless otherwise noted below.      Electronically signed by Luis Frazier MD on 11/9/2020 at 3:35 AM

## 2020-11-10 ENCOUNTER — ROUTINE PRENATAL (OUTPATIENT)
Dept: OBGYN CLINIC | Age: 35
End: 2020-11-10
Payer: COMMERCIAL

## 2020-11-10 VITALS
SYSTOLIC BLOOD PRESSURE: 110 MMHG | DIASTOLIC BLOOD PRESSURE: 68 MMHG | WEIGHT: 239 LBS | HEART RATE: 88 BPM | BODY MASS INDEX: 39.77 KG/M2

## 2020-11-10 PROCEDURE — G8482 FLU IMMUNIZE ORDER/ADMIN: HCPCS | Performed by: CLINICAL NURSE SPECIALIST

## 2020-11-10 PROCEDURE — G8417 CALC BMI ABV UP PARAM F/U: HCPCS | Performed by: CLINICAL NURSE SPECIALIST

## 2020-11-10 PROCEDURE — 99213 OFFICE O/P EST LOW 20 MIN: CPT | Performed by: CLINICAL NURSE SPECIALIST

## 2020-11-10 PROCEDURE — 4004F PT TOBACCO SCREEN RCVD TLK: CPT | Performed by: CLINICAL NURSE SPECIALIST

## 2020-11-10 PROCEDURE — G8427 DOCREV CUR MEDS BY ELIG CLIN: HCPCS | Performed by: CLINICAL NURSE SPECIALIST

## 2020-11-10 NOTE — PROGRESS NOTES
Tommy Bryson is a 30 y.o. female 17w6d        OB History    Para Term  AB Living   7 4 5 0 1 5   SAB TAB Ectopic Molar Multiple Live Births   1 0 0   0 6      # Outcome Date GA Lbr Randy/2nd Weight Sex Delivery Anes PTL Lv   7 Current            6 Term 09/01/15 40w4d  8 lb 8 oz (3.856 kg) M Vag-Spont EPI  MIKE      Birth Comments: Education information given to mother and she verbalizes understanding about the following:  Hour for [employer]. Patient Safety Education. Infant security including the four band system and the HUGS system. Skin to Skin Contact for Yo   5 Term 13 39w0d  7 lb (3.175 kg) F Vag-Spont None N MIKE   4 Term 12 40w0d  7 lb 13 oz (3.544 kg) F Vag-Spont EPI N MIKE      Birth Comments: NO GDM - pitocin induction - AROM   3 Term 11 39w1d  7 lb (3.175 kg) F Vag-Spont EPI N MIKE      Birth Comments: GDM - insulin control   2 Term 09 40w3d  7 lb 8 oz (3.402 kg) M Vag-Spont EPI N MIKE      Birth Comments: GDM - diet controled   1 SAB     U    DEC      Birth Comments: 1 st trimester incomplete ab followed by D&C               Blood pressure 110/68, pulse 88, weight 239 lb (108.4 kg), last menstrual period 01/10/2020, not currently breastfeeding. The patient was seen and evaluated. There was Positive fetal movements. No contractions or leakage of fluid. Signs and symptoms of  labor as well as labor were reviewed. A Quad Screen was ordered for a 15-20 week gestational age window. Dates were reviewed with the patient. An 18-22 week anatomy ultrasound has been ordered. The patient will return to the office for her next visit in 1 weeks. See antepartum flow sheet.      Patient Active Problem List    Diagnosis Date Noted    16 weeks gestation of pregnancy 2020    High-risk pregnancy in second trimester 2020    Dyslipidemia 10/29/2020    Vitamin D insufficiency 10/29/2020    Exposure to hepatitis C 10/29/2020 [facility]'s ED on 11/10/2017: With a chief complaint of wanting to be tested for hepatitis C. Patient states the father of her children who she is sexually active with has recently been diagnosed with hepatitis C. She is unsure if he uses IV drugs currently.  Tobacco abuse 10/28/2020    Need for vaccination 10/27/2020    15 weeks gestation of pregnancy 10/27/2020    Hx GDMA1 and 2 10/27/2020    Pregestational diabetes mellitus, modified White class B 10/27/2020    Varicose veins of both lower extremities with pain 10/28/2019    BMI 35.0-35.9,adult 10/28/2019    Hepatomegaly 08/15/2019    S/P hernia repair 80/92/4095     Umbilical hernia repair WITH mesh 6/9593      Umbilical hernia 81/90/9899    Right hip pain 09/24/2012    ABC (aneurysmal bone cyst) 09/24/2012    Diet controlled gestational diabetes mellitus (GDM) in second trimester         Diagnosis Orders   1. Encounter for supervision of high risk pregnancy in second trimester, antepartum     2. 17 weeks gestation of pregnancy     3. Diet controlled gestational diabetes mellitus (GDM) in second trimester     Continue prenatal vitamins, increase water intake and frequent rest periods as needed.     Electronically signed by: Kim Wooten CNP

## 2020-11-23 ENCOUNTER — HOSPITAL ENCOUNTER (OUTPATIENT)
Dept: DIABETES SERVICES | Age: 35
Setting detail: THERAPIES SERIES
Discharge: HOME | End: 2020-11-23
Payer: COMMERCIAL

## 2020-11-23 PROCEDURE — G0108 DIAB MANAGE TRN  PER INDIV: HCPCS

## 2020-11-23 NOTE — PROGRESS NOTES
Gestational Diabetes Education Progress Note - PHONE, 11/23/20 JW MNT f/u   This visit is a TELEPHONE encounter (During ZCSON-92 public health emergency). Pursuant to the emergency declaration under the [address], [address] [place] [address] authority and the [employer] Act, this TELEPHONE Visit was conducted with patient's (and/or legal guardian's) consent, to reduce the patient's risk of exposure to COVID-19 and provide necessary medical care. The patient (and/or legal guardian) has also been advised to contact this office for worsening conditions or problems, and seek emergency medical treatment and/or call 911 if deemed necessary. Patient identification was verified at the start of the visit: Yes    Total time spent for this encounter: 1 hour    Services were provided through a video synchronous discussion virtually to substitute for in-person clinic visit. Patient and provider were located at their individual home/office. Assessment of learning needs and self care was done - see Gestational Diabetes Screening          Teaching provided at this session included:11/23/20 JW    _X__ Definition of gestational diabetes    _X_ Risk factors   _X__ Effects on pregnancy       _ X_ Management   _X__ Self-monitoring of blood sugar (FBS and 2 hrs postprandial)   _X__ Blood sugar targets FBS 65-90 and <120 2 hrs postprandial)   _X__  Role of Insulins and use of injection methods  _x__ Pen   __x_ Raine Traylor ( discussed in general ) 11/23/20 JW pt prescribed 6 units Novolog ac lunch and supper. Hadn't started yet as wanted to try harder with diet but pt planning to start as can't control with just diet. Pt tends to skip lunch d/t busy afternoon schedule and ends to eat heavier at supper. Breakfast is small. Pt also has been drinking 8 oz cans regular pop and 2 pots of coffee with Hazelnut creamer.  Suggested pt eliminate regular pop and work on cutting back on coffee along with regular creamer. Gave suggestions for healthy fast food choices and  snacks to pack and have while running errands since she's not always able to lunch. Pt had GDM with 2 of her 5 pregnancies and took insulin with one of them. Patient did use insulin with prior pregnancy    _x___Hyperglycemia  _x__ Hypoglycemia and treatment           Meal plannin20 JW reviewed below and gave suggestions above. _X_  Avoid fruit juice and sugary beverages. _X_  Aim to eat small frequent meals and snacks. (ie., 3 + 3)                _X__  Eat balanced meals according to divided dinner plate sample            Planned follow-up:    _x_  Another appointment /  phone call support has been scheduled for RD on - one month 2020             _X_ Patient is to report blood glucose test results to physician as directed by  prescribing physician. Resource materials provided via mail prior to call includes:   Gestational Diabetes Mellitus ( GDM) toolkit form [place] gestational diabetes postpartum care learning collaborative 2018. Gestational diabetes handout from [facility] 2016  \"Simple Guidelines for meal planning with gestational diabetes\" handout 3 meals and 3 snacks  SMBG sheets to fax back to [facility] weekly  BD  healthy injection site selection and rotation with 6 mm insulin syringe and 4 mm pen needle  Did you have gestational diabetes when you were pregnant? Handout from [facility]  2014      Patient has set goal for practice of diabetes self management :  SMBG- check fasting and 2 hours PP, eat 3 meals and 3 snacks/day, avoid sugary beverages. Patient stated has use of a home BG meter and has /  has not started to test BG.   Self report BG are fasting in last 3 days 66 -  72 - 80 - post meal BG bit higher 143 - 139 and 92 ( prior to education on CHO controlled meals)   ID that patient will need cut back on morning coffee with flavored

## 2020-11-23 NOTE — LETTER
[facility]  [address] [facility]  Phone: [phone]         November 23, 2020    To: Dr. Kathryn Milner  Cc: Bernice Sanchez, JUSTIN-CNP3  From: Sedrick Medellin RD, LD    Patient: Tony River   YOB: 1985   Date of Visit: 11/23/20     The following content has been reviewed: Balancing Diabetes (Meal planning, using BG results, identifying BG targets)  Reducing Risks  Pathophysiology of GDM  Insulin Management- pt prescribed 6 units Novolog ac lunch and supper. Hasn't started taking but knows she needs it and plans to start. Pt admits to not starting aspirin tx- encouraged her to discuss with physician if she's uncertain about taking it. Pt has been drinking 2 pots of coffee daily using Hazelnut creamer. Suggested she should begin to lessen her intake and explained why. Also she's getting increased sugar with hazelnut creamer and drinking some regular pop. Discussed alternative options. An ongoing plan was created to include:follow up prn    Post Program Self Management Support Plans include:  Diabetes Care Appointments with MD (GURDEEP)    Thank you for the opportunity to provide Diabetes Self Management Education to your patient.

## 2020-12-08 ENCOUNTER — ROUTINE PRENATAL (OUTPATIENT)
Dept: OBGYN CLINIC | Age: 35
End: 2020-12-08
Payer: COMMERCIAL

## 2020-12-08 VITALS
WEIGHT: 242 LBS | SYSTOLIC BLOOD PRESSURE: 118 MMHG | HEART RATE: 84 BPM | DIASTOLIC BLOOD PRESSURE: 68 MMHG | BODY MASS INDEX: 40.27 KG/M2

## 2020-12-08 PROBLEM — Z3A.21 21 WEEKS GESTATION OF PREGNANCY: Status: ACTIVE | Noted: 2020-12-08

## 2020-12-08 PROCEDURE — G8482 FLU IMMUNIZE ORDER/ADMIN: HCPCS | Performed by: SPECIALIST

## 2020-12-08 PROCEDURE — 99213 OFFICE O/P EST LOW 20 MIN: CPT | Performed by: SPECIALIST

## 2020-12-08 PROCEDURE — 4004F PT TOBACCO SCREEN RCVD TLK: CPT | Performed by: SPECIALIST

## 2020-12-08 PROCEDURE — 3044F HG A1C LEVEL LT 7.0%: CPT | Performed by: SPECIALIST

## 2020-12-08 PROCEDURE — 2022F DILAT RTA XM EVC RTNOPTHY: CPT | Performed by: SPECIALIST

## 2020-12-08 PROCEDURE — G8417 CALC BMI ABV UP PARAM F/U: HCPCS | Performed by: SPECIALIST

## 2020-12-08 PROCEDURE — G8427 DOCREV CUR MEDS BY ELIG CLIN: HCPCS | Performed by: SPECIALIST

## 2020-12-08 RX ORDER — MULTIVIT 47/IRON/FOLATE 1/DHA 27-1-300MG
CAPSULE ORAL
COMMUNITY
Start: 2020-12-06 | End: 2021-08-31 | Stop reason: SDUPTHER

## 2020-12-08 NOTE — PROGRESS NOTES
Codey Fong is a 30 y.o. female 21w6d        OB History    Para Term  AB Living   7 4 5 0 1 5   SAB TAB Ectopic Molar Multiple Live Births   1 0 0   0 6      # Outcome Date GA Lbr Randy/2nd Weight Sex Delivery Anes PTL Lv   7 Current            6 Term 09/01/15 40w4d  8 lb 8 oz (3.856 kg) M Vag-Spont EPI  MIKE      Birth Comments: Education information given to mother and she verbalizes understanding about the following:  Hour for [employer]. Patient Safety Education. Infant security including the four band system and the HUGS system. Skin to Skin Contact for Yo   5 Term 13 39w0d  7 lb (3.175 kg) F Vag-Spont None N MIKE   4 Term 12 40w0d  7 lb 13 oz (3.544 kg) F Vag-Spont EPI N MIKE      Birth Comments: NO GDM - pitocin induction - AROM   3 Term 11 39w1d  7 lb (3.175 kg) F Vag-Spont EPI N MIKE      Birth Comments: GDM - insulin control   2 Term 09 40w3d  7 lb 8 oz (3.402 kg) M Vag-Spont EPI N MIKE      Birth Comments: GDM - diet controled   1 SAB     U    DEC      Birth Comments: 1 st trimester incomplete ab followed by [facility]        Chief Complaint   Patient presents with    High Risk Pregnancy        Blood pressure 118/68, pulse 84, weight 242 lb (109.8 kg), last menstrual period 01/10/2020, not currently breastfeeding. The patient was seen and evaluated. There was positive fetal movements. No contractions or leakage of fluid. Signs and symptoms of  labor as well as labor were reviewed. The patient's anatomy ultrasound has been not been done at this time. Patient was scheduled to have this completed at [facility], however she did not keep this appointment. The S/S of Pre-Eclampsia were reviewed with the patient in detail. She is to report any of these if they occur. She currently denies any of these. The patient is RH positive Rhogam Ordered: Not indicated.     Patient Active Problem List    Diagnosis Date Noted    21 weeks gestation of pregnancy 12/08/2020    16 weeks gestation of pregnancy 11/03/2020    High-risk pregnancy in second trimester 11/03/2020    Dyslipidemia 10/29/2020    Vitamin D insufficiency 10/29/2020    Exposure to hepatitis C 10/29/2020      [facility] ED on 11/10/2017: With a chief complaint of wanting to be tested for hepatitis C. Patient states the father of her children who she is sexually active with has recently been diagnosed with hepatitis C. She is unsure if he uses IV drugs currently.  Tobacco abuse 10/28/2020    Need for vaccination 10/27/2020    15 weeks gestation of pregnancy 10/27/2020    Hx GDMA1 and 2 10/27/2020    Pregestational diabetes mellitus, modified White class B 10/27/2020    Varicose veins of both lower extremities with pain 10/28/2019    BMI 35.0-35.9,adult 10/28/2019    Hepatomegaly 08/15/2019    S/P hernia repair 93/65/5407     Umbilical hernia repair WITH mesh 2/8781      Umbilical hernia 16/67/3065    Right hip pain 09/24/2012    ABC (aneurysmal bone cyst) 09/24/2012    Diet controlled gestational diabetes mellitus (GDM) in second trimester         Diagnosis Orders   1. 21 weeks gestation of pregnancy     2. Pregestational diabetes mellitus, modified White class B     3. High-risk pregnancy in second trimester         Patient with pregestational diabetes. She did not keep her appointment with [facility] because she states that she had to go back to work. Patient was encouraged to reschedule her appointment as soon as possible. The importance of prenatal care was reinforced with patient and she was advised to keep all scheduled appointments. Fetal heart rate auscultated at 137 bpm and fundal height is 19 cm. today. Patient advised to continue taking prenatal vitamins and to rest as necessary. The patient will return to the office for her next visit in 1 week. See antepartum flow sheet. Basia Mejia am scribing for, and in the presence of Dr. Desmond Aranda. Electronically signed by: Meka Nesbitt 12/8/20 4:02 PM   I agree to the above documentation placed by my scribe Meka Nesbitt. I reviewed the scribe's note and agree with the documented findings and plan of care. Any areas of disagreement are noted on the chart. I have personally evaluated this patient. Additional findings are as noted. I agree with the chief complaint, past medical history, past surgical history, allergies, medications, social and family history as documented unless otherwise noted below.      Electronically signed by Amaris Dempsey MD on 12/9/2020 at 1:38 AM

## 2020-12-17 ENCOUNTER — ROUTINE PRENATAL (OUTPATIENT)
Dept: PERINATAL CARE | Age: 35
End: 2020-12-17
Payer: COMMERCIAL

## 2020-12-17 VITALS
BODY MASS INDEX: 39.82 KG/M2 | RESPIRATION RATE: 16 BRPM | SYSTOLIC BLOOD PRESSURE: 110 MMHG | HEART RATE: 80 BPM | TEMPERATURE: 97.3 F | HEIGHT: 65 IN | WEIGHT: 239 LBS | DIASTOLIC BLOOD PRESSURE: 64 MMHG

## 2020-12-17 PROCEDURE — 93325 DOPPLER ECHO COLOR FLOW MAPG: CPT | Performed by: OBSTETRICS & GYNECOLOGY

## 2020-12-17 PROCEDURE — 76817 TRANSVAGINAL US OBSTETRIC: CPT | Performed by: OBSTETRICS & GYNECOLOGY

## 2020-12-17 PROCEDURE — 76811 OB US DETAILED SNGL FETUS: CPT | Performed by: OBSTETRICS & GYNECOLOGY

## 2020-12-17 PROCEDURE — 76825 ECHO EXAM OF FETAL HEART: CPT | Performed by: OBSTETRICS & GYNECOLOGY

## 2020-12-17 PROCEDURE — 76827 ECHO EXAM OF FETAL HEART: CPT | Performed by: OBSTETRICS & GYNECOLOGY

## 2020-12-17 RX ORDER — ACETAMINOPHEN 500 MG
500 TABLET ORAL EVERY 6 HOURS PRN
COMMUNITY
Start: 2020-12-14 | End: 2021-02-09

## 2020-12-22 ENCOUNTER — ROUTINE PRENATAL (OUTPATIENT)
Dept: OBGYN CLINIC | Age: 35
End: 2020-12-22
Payer: COMMERCIAL

## 2020-12-22 VITALS
WEIGHT: 236 LBS | DIASTOLIC BLOOD PRESSURE: 78 MMHG | HEART RATE: 73 BPM | BODY MASS INDEX: 39.27 KG/M2 | SYSTOLIC BLOOD PRESSURE: 132 MMHG

## 2020-12-22 PROCEDURE — G8417 CALC BMI ABV UP PARAM F/U: HCPCS | Performed by: CLINICAL NURSE SPECIALIST

## 2020-12-22 PROCEDURE — G8482 FLU IMMUNIZE ORDER/ADMIN: HCPCS | Performed by: CLINICAL NURSE SPECIALIST

## 2020-12-22 PROCEDURE — G8427 DOCREV CUR MEDS BY ELIG CLIN: HCPCS | Performed by: CLINICAL NURSE SPECIALIST

## 2020-12-22 PROCEDURE — 4004F PT TOBACCO SCREEN RCVD TLK: CPT | Performed by: CLINICAL NURSE SPECIALIST

## 2020-12-22 PROCEDURE — 99213 OFFICE O/P EST LOW 20 MIN: CPT | Performed by: CLINICAL NURSE SPECIALIST

## 2020-12-22 NOTE — PROGRESS NOTES
Diane Rojas is a 30 y.o. female 23w6d, patient reports that her blood glucose is high and low based on what she eats, she admits to cheating and her BS are elevated when she eats sweets. [id number]    OB History    Para Term  AB Living   7 4 5 0 1 5   SAB TAB Ectopic Molar Multiple Live Births   1 0 0   0 6      # Outcome Date GA Lbr Randy/2nd Weight Sex Delivery Anes PTL Lv   7 Current            6 Term 09/01/15 40w4d  8 lb 8 oz (3.856 kg) M Vag-Spont EPI  MIKE      Birth Comments: Education information given to mother and she verbalizes understanding about the following:  Hour for [employer]. Patient Safety Education. Infant security including the four band system and the HUGS system. Skin to Skin Contact for Yo   5 Term 13 39w0d  7 lb (3.175 kg) F Vag-Spont None N MIKE   4 Term 12 40w0d  7 lb 13 oz (3.544 kg) F Vag-Spont EPI N MIKE      Birth Comments: NO GDM - pitocin induction - AROM   3 Term 11 39w1d  7 lb (3.175 kg) F Vag-Spont EPI N MIKE      Birth Comments: GDM - insulin control   2 Term 09 40w3d  7 lb 8 oz (3.402 kg) M Vag-Spont EPI N MIKE      Birth Comments: GDM - diet controled   1 SAB     U    DEC      Birth Comments: 1 st trimester incomplete ab followed by D&C         Blood pressure 132/78, pulse 73, weight 236 lb (107 kg), last menstrual period 01/10/2020, not currently breastfeeding. The patient was seen and evaluated. There was positive fetal movements. No contractions or leakage of fluid. Signs and symptoms of  labor as well as labor were reviewed. The patients anatomy ultrasound has been completed and reviewed with patient. A 28 week lab panel was ordered. This includes a (CBC, 1 hr GTT). The patient is to complete this in the next two to four weeks. The S/S of Pre-Eclampsia were reviewed with the patient in detail. She is to report any of these if they occur. She currently denies any of these.     The patient is RH positive Rhogam Ordered: Not due at this time. Patient Active Problem List    Diagnosis Date Noted    21 weeks gestation of pregnancy 12/08/2020    16 weeks gestation of pregnancy 11/03/2020    High-risk pregnancy in second trimester 11/03/2020    Dyslipidemia 10/29/2020    Vitamin D insufficiency 10/29/2020    Exposure to hepatitis C 10/29/2020      [facility] ED on 11/10/2017: With a chief complaint of wanting to be tested for hepatitis C. Patient states the father of her children who she is sexually active with has recently been diagnosed with hepatitis C. She is unsure if he uses IV drugs currently.  Tobacco abuse 10/28/2020    Need for vaccination 10/27/2020    15 weeks gestation of pregnancy 10/27/2020    Hx GDMA1 and 2 10/27/2020    Pregestational diabetes mellitus, modified White class B 10/27/2020    Varicose veins of both lower extremities with pain 10/28/2019    BMI 35.0-35.9,adult 10/28/2019    Hepatomegaly 08/15/2019    S/P hernia repair 87/20/9257     Umbilical hernia repair WITH mesh 2/4315      Umbilical hernia 49/44/9727    Right hip pain 09/24/2012    ABC (aneurysmal bone cyst) 09/24/2012    Diet controlled gestational diabetes mellitus (GDM) in second trimester         Diagnosis Orders   1. Encounter for supervision of high risk pregnancy in second trimester, antepartum     2. 23 weeks gestation of pregnancy     3. Diet controlled gestational diabetes mellitus (GDM) in second trimester     Continue prenatal vitamins, increase water intake and frequent rest periods as needed. The patient will return to the office for her next visit in 1 weeks. See antepartum flow sheet.      Electronically signed by: Bonnie Jones CNP

## 2020-12-29 ENCOUNTER — ROUTINE PRENATAL (OUTPATIENT)
Dept: OBGYN CLINIC | Age: 35
End: 2020-12-29
Payer: COMMERCIAL

## 2020-12-29 VITALS
SYSTOLIC BLOOD PRESSURE: 116 MMHG | WEIGHT: 240 LBS | DIASTOLIC BLOOD PRESSURE: 64 MMHG | HEART RATE: 79 BPM | BODY MASS INDEX: 39.94 KG/M2 | TEMPERATURE: 97.2 F

## 2020-12-29 PROBLEM — Z3A.15 15 WEEKS GESTATION OF PREGNANCY: Status: RESOLVED | Noted: 2020-10-27 | Resolved: 2020-12-29

## 2020-12-29 PROBLEM — Z3A.24 24 WEEKS GESTATION OF PREGNANCY: Status: ACTIVE | Noted: 2020-12-29

## 2020-12-29 PROBLEM — Z3A.16 16 WEEKS GESTATION OF PREGNANCY: Status: RESOLVED | Noted: 2020-11-03 | Resolved: 2020-12-29

## 2020-12-29 PROCEDURE — G8482 FLU IMMUNIZE ORDER/ADMIN: HCPCS | Performed by: SPECIALIST

## 2020-12-29 PROCEDURE — 2022F DILAT RTA XM EVC RTNOPTHY: CPT | Performed by: SPECIALIST

## 2020-12-29 PROCEDURE — G8417 CALC BMI ABV UP PARAM F/U: HCPCS | Performed by: SPECIALIST

## 2020-12-29 PROCEDURE — 99213 OFFICE O/P EST LOW 20 MIN: CPT | Performed by: SPECIALIST

## 2020-12-29 PROCEDURE — 4004F PT TOBACCO SCREEN RCVD TLK: CPT | Performed by: SPECIALIST

## 2020-12-29 PROCEDURE — 3044F HG A1C LEVEL LT 7.0%: CPT | Performed by: SPECIALIST

## 2020-12-29 PROCEDURE — G8427 DOCREV CUR MEDS BY ELIG CLIN: HCPCS | Performed by: SPECIALIST

## 2020-12-29 NOTE — PROGRESS NOTES
Angel Mills is a [address] [place] [address] y.o. female 18w6d        OB History    Para Term  AB Living   7 4 5 0 1 5   SAB TAB Ectopic Molar Multiple Live Births   1 0 0   0 6      # Outcome Date GA Lbr Randy/2nd Weight Sex Delivery Anes PTL Lv   7 Current            6 Term 09/01/15 40w4d  8 lb 8 oz (3.856 kg) M Vag-Spont EPI  MIKE      Birth Comments: Education information given to mother and she verbalizes understanding about the following:  Hour for [employer]. Patient Safety Education. Infant security including the four band system and the HUGS system. Skin to Skin Contact for Yo   5 Term 13 39w0d  7 lb (3.175 kg) F Vag-Spont None N MIKE   4 Term 12 40w0d  7 lb 13 oz (3.544 kg) F Vag-Spont EPI N MIKE      Birth Comments: NO GDM - pitocin induction - AROM   3 Term 11 39w1d  7 lb (3.175 kg) F Vag-Spont EPI N MIKE      Birth Comments: GDM - insulin control   2 Term 09 40w3d  7 lb 8 oz (3.402 kg) M Vag-Spont EPI N MIKE      Birth Comments: GDM - diet controled   1 SAB     U    DEC      Birth Comments: 1 st trimester incomplete ab followed by [facility]        Chief Complaint   Patient presents with    Routine Prenatal Visit        Blood pressure 116/64, pulse 79, temperature 97.2 °F (36.2 °C), weight 240 lb (108.9 kg), last menstrual period 01/10/2020, not currently breastfeeding. The patient was seen and evaluated. There was positive fetal movements. No contractions or leakage of fluid. Signs and symptoms of  labor as well as labor were reviewed. The patient's anatomy ultrasound has been completed and reviewed with patient. The S/S of Pre-Eclampsia were reviewed with the patient in detail. She is to report any of these if they occur. She currently denies any of these. The patient is RH positive Rhogam Ordered: Not indicated.     Patient Active Problem List    Diagnosis Date Noted    24 weeks gestation of pregnancy 2020    21 weeks gestation of pregnancy

## 2021-01-05 ENCOUNTER — ROUTINE PRENATAL (OUTPATIENT)
Dept: OBGYN CLINIC | Age: 36
End: 2021-01-05
Payer: COMMERCIAL

## 2021-01-05 VITALS
SYSTOLIC BLOOD PRESSURE: 107 MMHG | WEIGHT: 241.2 LBS | HEART RATE: 79 BPM | BODY MASS INDEX: 40.14 KG/M2 | TEMPERATURE: 97.5 F | DIASTOLIC BLOOD PRESSURE: 67 MMHG

## 2021-01-05 DIAGNOSIS — O09.92 HIGH-RISK PREGNANCY IN SECOND TRIMESTER: ICD-10-CM

## 2021-01-05 DIAGNOSIS — O24.410 DIET CONTROLLED GESTATIONAL DIABETES MELLITUS (GDM) IN SECOND TRIMESTER: ICD-10-CM

## 2021-01-05 DIAGNOSIS — Z3A.25 25 WEEKS GESTATION OF PREGNANCY: Primary | ICD-10-CM

## 2021-01-05 PROBLEM — Z3A.24 24 WEEKS GESTATION OF PREGNANCY: Status: RESOLVED | Noted: 2020-12-29 | Resolved: 2021-01-05

## 2021-01-05 PROBLEM — Z3A.21 21 WEEKS GESTATION OF PREGNANCY: Status: RESOLVED | Noted: 2020-12-08 | Resolved: 2021-01-05

## 2021-01-05 PROCEDURE — G8417 CALC BMI ABV UP PARAM F/U: HCPCS | Performed by: SPECIALIST

## 2021-01-05 PROCEDURE — G8482 FLU IMMUNIZE ORDER/ADMIN: HCPCS | Performed by: SPECIALIST

## 2021-01-05 PROCEDURE — G8427 DOCREV CUR MEDS BY ELIG CLIN: HCPCS | Performed by: SPECIALIST

## 2021-01-05 PROCEDURE — 4004F PT TOBACCO SCREEN RCVD TLK: CPT | Performed by: SPECIALIST

## 2021-01-05 PROCEDURE — 99213 OFFICE O/P EST LOW 20 MIN: CPT | Performed by: SPECIALIST

## 2021-01-05 NOTE — PROGRESS NOTES
Marie Peng is a 30 y.o. female 23w6d        OB History    Para Term  AB Living   7 4 5 0 1 5   SAB TAB Ectopic Molar Multiple Live Births   1 0 0   0 6      # Outcome Date GA Lbr Randy/2nd Weight Sex Delivery Anes PTL Lv   7 Current            6 Term 09/01/15 40w4d  8 lb 8 oz (3.856 kg) M Vag-Spont EPI  MIKE      Birth Comments: Education information given to mother and she verbalizes understanding about the following:  Hour for [employer]. Patient Safety Education. Infant security including the four band system and the HUGS system. Skin to Skin Contact for Yo   5 Term 13 39w0d  7 lb (3.175 kg) F Vag-Spont None N MIKE   4 Term 12 40w0d  7 lb 13 oz (3.544 kg) F Vag-Spont EPI N MIKE      Birth Comments: NO GDM - pitocin induction - AROM   3 Term 11 39w1d  7 lb (3.175 kg) F Vag-Spont EPI N MIKE      Birth Comments: GDM - insulin control   2 Term 09 40w3d  7 lb 8 oz (3.402 kg) M Vag-Spont EPI N MIKE      Birth Comments: GDM - diet controled   1 SAB     U    DEC      Birth Comments: 1 st trimester incomplete ab followed by [facility]        Chief Complaint   Patient presents with    Routine Prenatal Visit     Patient is 25 weeks and 6 days gestation and is here for supervision of high risk pregnancy.  High Risk Pregnancy    Gestational Diabetes        Blood pressure 107/67, pulse 79, temperature 97.5 °F (36.4 °C), temperature source Temporal, weight 241 lb 3.2 oz (109.4 kg), last menstrual period 01/10/2020, not currently breastfeeding. The patient was seen and evaluated. There was positive fetal movements. No contractions or leakage of fluid. Signs and symptoms of  labor as well as labor were reviewed. The patient's anatomy ultrasound has been completed and reviewed with patient. The S/S of Pre-Eclampsia were reviewed with the patient in detail. She is to report any of these if they occur. She currently denies any of these.     The patient is RH positive Rhogam Ordered: Not indicated. Patient Active Problem List    Diagnosis Date Noted    25 weeks gestation of pregnancy 01/05/2021    High-risk pregnancy in second trimester 11/03/2020    Dyslipidemia 10/29/2020    Vitamin D insufficiency 10/29/2020    Exposure to hepatitis C 10/29/2020      [facility] ED on 11/10/2017: With a chief complaint of wanting to be tested for hepatitis C. Patient states the father of her children who she is sexually active with has recently been diagnosed with hepatitis C. She is unsure if he uses IV drugs currently.  Tobacco abuse 10/28/2020    Need for vaccination 10/27/2020    Hx GDMA1 and 2 10/27/2020    Pregestational diabetes mellitus, modified White class B 10/27/2020    Varicose veins of both lower extremities with pain 10/28/2019    BMI 35.0-35.9,adult 10/28/2019    Hepatomegaly 08/15/2019    S/P hernia repair 87/01/2134     Umbilical hernia repair WITH mesh 3/2861      Umbilical hernia 58/54/9140    Right hip pain 09/24/2012    ABC (aneurysmal bone cyst) 09/24/2012    Diet controlled gestational diabetes mellitus (GDM) in second trimester         Diagnosis Orders   1. 25 weeks gestation of pregnancy     2. BMI 35.0-35.9,adult     3. Diet controlled gestational diabetes mellitus (GDM) in second trimester     4. High-risk pregnancy in second trimester         Patient states she stopped taking aspirin. She was advised to restart aspirin. She has some edema on exam.  She is seeing [facility] for diabetes. She has been monitoring her blood sugar. Fetal heart rate auscultated at 135 bpm and fundal height is 26 cm. today. Patient advised to continue taking prenatal vitamins and to rest as necessary. The patient will return to the office for her next visit in 1 week. See antepartum flow sheet. Didier Yoo am scribing for, and in the presence of Dr. Allison Constantino.    Electronically signed by: Taryn Morris 1/5/21 3:40 PM   I agree to the above documentation placed by my scribe Ana Sanchez. I reviewed the scribe's note and agree with the documented findings and plan of care. Any areas of disagreement are noted on the chart. I have personally evaluated this patient. Additional findings are as noted. I agree with the chief complaint, past medical history, past surgical history, allergies, medications, social and family history as documented unless otherwise noted below.      Electronically signed by Sharonda Stout MD on 1/6/2021 at 3:35 AM

## 2021-01-12 ENCOUNTER — ROUTINE PRENATAL (OUTPATIENT)
Dept: OBGYN CLINIC | Age: 36
End: 2021-01-12
Payer: COMMERCIAL

## 2021-01-12 VITALS
HEART RATE: 72 BPM | BODY MASS INDEX: 40.62 KG/M2 | RESPIRATION RATE: 18 BRPM | WEIGHT: 243.8 LBS | DIASTOLIC BLOOD PRESSURE: 69 MMHG | TEMPERATURE: 97.9 F | HEIGHT: 65 IN | SYSTOLIC BLOOD PRESSURE: 111 MMHG

## 2021-01-12 DIAGNOSIS — O09.92 HIGH-RISK PREGNANCY IN SECOND TRIMESTER: ICD-10-CM

## 2021-01-12 DIAGNOSIS — Z3A.26 26 WEEKS GESTATION OF PREGNANCY: Primary | ICD-10-CM

## 2021-01-12 DIAGNOSIS — O24.319 PREGESTATIONAL DIABETES MELLITUS, MODIFIED WHITE CLASS B: ICD-10-CM

## 2021-01-12 PROCEDURE — 99213 OFFICE O/P EST LOW 20 MIN: CPT | Performed by: SPECIALIST

## 2021-01-12 PROCEDURE — G8427 DOCREV CUR MEDS BY ELIG CLIN: HCPCS | Performed by: SPECIALIST

## 2021-01-12 PROCEDURE — 2022F DILAT RTA XM EVC RTNOPTHY: CPT | Performed by: SPECIALIST

## 2021-01-12 PROCEDURE — G8482 FLU IMMUNIZE ORDER/ADMIN: HCPCS | Performed by: SPECIALIST

## 2021-01-12 PROCEDURE — 4004F PT TOBACCO SCREEN RCVD TLK: CPT | Performed by: SPECIALIST

## 2021-01-12 PROCEDURE — 3046F HEMOGLOBIN A1C LEVEL >9.0%: CPT | Performed by: SPECIALIST

## 2021-01-12 PROCEDURE — G8417 CALC BMI ABV UP PARAM F/U: HCPCS | Performed by: SPECIALIST

## 2021-01-14 ENCOUNTER — ROUTINE PRENATAL (OUTPATIENT)
Dept: PERINATAL CARE | Age: 36
End: 2021-01-14
Payer: COMMERCIAL

## 2021-01-14 VITALS
HEIGHT: 65 IN | BODY MASS INDEX: 40.65 KG/M2 | TEMPERATURE: 97.2 F | RESPIRATION RATE: 16 BRPM | SYSTOLIC BLOOD PRESSURE: 128 MMHG | WEIGHT: 244 LBS | DIASTOLIC BLOOD PRESSURE: 67 MMHG | HEART RATE: 81 BPM

## 2021-01-14 DIAGNOSIS — Z3A.27 27 WEEKS GESTATION OF PREGNANCY: ICD-10-CM

## 2021-01-14 DIAGNOSIS — Z36.4 ANTENATAL SCREENING FOR FETAL GROWTH RETARDATION USING ULTRASONICS: ICD-10-CM

## 2021-01-14 DIAGNOSIS — O09.42 GRAND MULTIPARITY WITH CURRENT PREGNANCY IN SECOND TRIMESTER: ICD-10-CM

## 2021-01-14 DIAGNOSIS — O24.119 PRE-EXISTING TYPE 2 DIABETES MELLITUS DURING PREGNANCY, ANTEPARTUM: Primary | ICD-10-CM

## 2021-01-14 DIAGNOSIS — O99.212 OBESITY AFFECTING PREGNANCY IN SECOND TRIMESTER: ICD-10-CM

## 2021-01-14 DIAGNOSIS — O99.330 TOBACCO USE DISORDER COMPLICATING PREGNANCY, CHILDBIRTH, OR PUERPERIUM, ANTEPARTUM: ICD-10-CM

## 2021-01-14 PROCEDURE — 76819 FETAL BIOPHYS PROFIL W/O NST: CPT | Performed by: OBSTETRICS & GYNECOLOGY

## 2021-01-14 PROCEDURE — 76816 OB US FOLLOW-UP PER FETUS: CPT | Performed by: OBSTETRICS & GYNECOLOGY

## 2021-01-16 NOTE — PROGRESS NOTES
Mortimer Dupont is a 30 y.o. female 29w11d        OB History    Para Term  AB Living   7 4 5 0 1 5   SAB TAB Ectopic Molar Multiple Live Births   1 0 0   0 6      # Outcome Date GA Lbr Randy/2nd Weight Sex Delivery Anes PTL Lv   7 Current            6 Term 09/01/15 40w4d  8 lb 8 oz (3.856 kg) M Vag-Spont EPI  MIKE      Birth Comments: Education information given to mother and she verbalizes understanding about the following:  Hour for [employer]. Patient Safety Education. Infant security including the four band system and the HUGS system. Skin to Skin Contact for Yo   5 Term 13 39w0d  7 lb (3.175 kg) F Vag-Spont None N MIKE   4 Term 12 40w0d  7 lb 13 oz (3.544 kg) F Vag-Spont EPI N MIKE      Birth Comments: NO GDM - pitocin induction - AROM   3 Term 11 39w1d  7 lb (3.175 kg) F Vag-Spont EPI N MIKE      Birth Comments: GDM - insulin control   2 Term 09 40w3d  7 lb 8 oz (3.402 kg) M Vag-Spont EPI N MIKE      Birth Comments: GDM - diet controled   1 2007    U    DEC      Birth Comments: 1 st trimester incomplete ab followed by [facility]        Chief Complaint   Patient presents with    Routine Prenatal Visit     The patient is here for her 26 week and 6 day OB check.  High Risk Pregnancy     The patient is high risk due to GDM.  Other     The pt complains of right leg swelling x1 day (21); has since resolved. Blood pressure 111/69, pulse 72, temperature 97.9 °F (36.6 °C), resp. rate 18, height 5' 5\" (1.651 m), weight 243 lb 12.8 oz (110.6 kg), last menstrual period 01/10/2020, not currently breastfeeding. The patient was seen and evaluated. There was positive fetal movements. No contractions or leakage of fluid. Signs and symptoms of  labor as well as labor were reviewed. The patient's anatomy ultrasound has been completed and reviewed with patient. The S/S of Pre-Eclampsia were reviewed with the patient in detail.  She is to report any of these if they occur. She currently denies any of these. The patient is RH positive Rhogam Ordered: Not indicated. Patient Active Problem List    Diagnosis Date Noted    26 weeks gestation of pregnancy 01/12/2021    25 weeks gestation of pregnancy 01/05/2021    High-risk pregnancy in second trimester 11/03/2020    Dyslipidemia 10/29/2020    Vitamin D insufficiency 10/29/2020    Exposure to hepatitis C 10/29/2020      [facility] ED on 11/10/2017: With a chief complaint of wanting to be tested for hepatitis C. Patient states the father of her children who she is sexually active with has recently been diagnosed with hepatitis C. She is unsure if he uses IV drugs currently.  Tobacco abuse 10/28/2020    Need for vaccination 10/27/2020    Hx GDMA1 and 2 10/27/2020    Pregestational diabetes mellitus, modified White class B 10/27/2020    Varicose veins of both lower extremities with pain 10/28/2019    BMI 35.0-35.9,adult 10/28/2019    Hepatomegaly 08/15/2019    S/P hernia repair 36/86/1935     Umbilical hernia repair WITH mesh 2/9737      Umbilical hernia 06/41/7600    Right hip pain 09/24/2012    ABC (aneurysmal bone cyst) 09/24/2012    Diet controlled gestational diabetes mellitus (GDM) in second trimester         Diagnosis Orders   1. 26 weeks gestation of pregnancy     2. High-risk pregnancy in second trimester     3. Pregestational diabetes mellitus, modified White class B         Patient doing well. Patient advised to follow up with MFM as scheduled. Patient states diabetes is controlled. Fetal heart rate auscultated at 145 bpm and fundal height is 28 cm. today. Patient advised to continue taking prenatal vitamins and to rest as necessary. The patient will return to the office for her next visit in 1 week. See antepartum flow sheet. Didier Yoo am scribing for, and in the presence of Dr. Allison Constantino.    Electronically signed by: Taryn Morris 1/16/21 8:04 cody BARILLAS

## 2021-01-19 ENCOUNTER — ROUTINE PRENATAL (OUTPATIENT)
Dept: OBGYN CLINIC | Age: 36
End: 2021-01-19
Payer: COMMERCIAL

## 2021-01-19 VITALS
WEIGHT: 245 LBS | HEART RATE: 84 BPM | BODY MASS INDEX: 40.77 KG/M2 | SYSTOLIC BLOOD PRESSURE: 134 MMHG | DIASTOLIC BLOOD PRESSURE: 78 MMHG

## 2021-01-19 DIAGNOSIS — Z3A.27 27 WEEKS GESTATION OF PREGNANCY: Primary | ICD-10-CM

## 2021-01-19 DIAGNOSIS — O24.410 DIET CONTROLLED GESTATIONAL DIABETES MELLITUS (GDM) IN SECOND TRIMESTER: ICD-10-CM

## 2021-01-19 DIAGNOSIS — O09.92 HIGH-RISK PREGNANCY IN SECOND TRIMESTER: ICD-10-CM

## 2021-01-19 PROBLEM — Z3A.25 25 WEEKS GESTATION OF PREGNANCY: Status: RESOLVED | Noted: 2021-01-05 | Resolved: 2021-01-19

## 2021-01-19 PROCEDURE — 4004F PT TOBACCO SCREEN RCVD TLK: CPT | Performed by: SPECIALIST

## 2021-01-19 PROCEDURE — G8417 CALC BMI ABV UP PARAM F/U: HCPCS | Performed by: SPECIALIST

## 2021-01-19 PROCEDURE — 99213 OFFICE O/P EST LOW 20 MIN: CPT | Performed by: SPECIALIST

## 2021-01-19 PROCEDURE — G8427 DOCREV CUR MEDS BY ELIG CLIN: HCPCS | Performed by: SPECIALIST

## 2021-01-19 PROCEDURE — G8482 FLU IMMUNIZE ORDER/ADMIN: HCPCS | Performed by: SPECIALIST

## 2021-01-23 NOTE — PROGRESS NOTES
Neelam Blake is a 30 y.o. female 31w5d        OB History    Para Term  AB Living   7 4 5 0 1 5   SAB TAB Ectopic Molar Multiple Live Births   1 0 0   0 6      # Outcome Date GA Lbr Randy/2nd Weight Sex Delivery Anes PTL Lv   7 Current            6 Term 09/01/15 40w4d  8 lb 8 oz (3.856 kg) M Vag-Spont EPI  MIKE      Birth Comments: Education information given to mother and she verbalizes understanding about the following:  Hour for [employer]. Patient Safety Education. Infant security including the four band system and the HUGS system. Skin to Skin Contact for Yo   5 Term 13 39w0d  7 lb (3.175 kg) F Vag-Spont None N MIKE   4 Term 12 40w0d  7 lb 13 oz (3.544 kg) F Vag-Spont EPI N MIKE      Birth Comments: NO GDM - pitocin induction - AROM   3 Term 11 39w1d  7 lb (3.175 kg) F Vag-Spont EPI N MIKE      Birth Comments: GDM - insulin control   2 Term 09 40w3d  7 lb 8 oz (3.402 kg) M Vag-Spont EPI N MIKE      Birth Comments: GDM - diet controled   1 SAB     U    DEC      Birth Comments: 1 st trimester incomplete ab followed by [facility]        Chief Complaint   Patient presents with    High Risk Pregnancy        Blood pressure 134/78, pulse 84, weight 245 lb (111.1 kg), last menstrual period 01/10/2020, not currently breastfeeding. The patient was seen and evaluated. There was positive fetal movements. No contractions or leakage of fluid. Signs and symptoms of  labor as well as labor were reviewed. The S/S of Pre-Eclampsia were reviewed with the patient in detail. She is to report any of these if they occur. She currently denies any of these. The patient was instructed on fetal kick counts and was given a kick sheet to complete every 8 hours.  She was instructed that the baby should move at a minimum of ten times within one hour after a meal. The patient was instructed to lay down on her left side twenty minutes after eating and count movements for up to one hour with a target value of ten movements. She was instructed to notify the office if she did not make that target after two attempts or if after any attempt there was less than four movements. The patient reports that the targets have been made Yes. Patient Active Problem List    Diagnosis Date Noted    26 weeks gestation of pregnancy 01/12/2021    High-risk pregnancy in second trimester 11/03/2020    Dyslipidemia 10/29/2020    Vitamin D insufficiency 10/29/2020    Exposure to hepatitis C 10/29/2020      [facility] ED on 11/10/2017: With a chief complaint of wanting to be tested for hepatitis C. Patient states the father of her children who she is sexually active with has recently been diagnosed with hepatitis C. She is unsure if he uses IV drugs currently.  Tobacco abuse 10/28/2020    Need for vaccination 10/27/2020    Hx GDMA1 and 2 10/27/2020    Pregestational diabetes mellitus, modified White class B 10/27/2020    Varicose veins of both lower extremities with pain 10/28/2019    BMI 35.0-35.9,adult 10/28/2019    Hepatomegaly 08/15/2019    S/P hernia repair 06/39/6216     Umbilical hernia repair WITH mesh 6/9141      Umbilical hernia 78/24/1387    Right hip pain 09/24/2012    ABC (aneurysmal bone cyst) 09/24/2012    Diet controlled gestational diabetes mellitus (GDM) in second trimester         Diagnosis Orders   1. 27 weeks gestation of pregnancy     2. Diet controlled gestational diabetes mellitus (GDM) in second trimester     3. High-risk pregnancy in second trimester         Patient doing well. Fetal heart rate ausculted at 135 bpm and fundal height is 29 cm. today. Patient advised to continue taking prenatal vitamins and to rest as necessary. The patient will return to the office for her next visit in 1 week. See antepartum flow sheet. Daniel Mcneal am scribing for, and in the presence of Dr. Davide Posadas.    Electronically signed by: Urvashi Daniel 1/23/21 8:14 AM   I agree to the above documentation placed by my scribe Irish Dandy. I reviewed the scribe's note and agree with the documented findings and plan of care. Any areas of disagreement are noted on the chart. I have personally evaluated this patient. Additional findings are as noted. I agree with the chief complaint, past medical history, past surgical history, allergies, medications, social and family history as documented unless otherwise noted below.      Electronically signed by Calton Peabody, MD on 1/24/2021 at 8:33 AM

## 2021-01-25 ENCOUNTER — ROUTINE PRENATAL (OUTPATIENT)
Dept: OBGYN CLINIC | Age: 36
End: 2021-01-25
Payer: COMMERCIAL

## 2021-01-25 ENCOUNTER — HOSPITAL ENCOUNTER (OUTPATIENT)
Age: 36
Setting detail: SPECIMEN
Discharge: HOME | End: 2021-01-25
Payer: COMMERCIAL

## 2021-01-25 VITALS
HEART RATE: 77 BPM | SYSTOLIC BLOOD PRESSURE: 115 MMHG | BODY MASS INDEX: 41.1 KG/M2 | WEIGHT: 247 LBS | DIASTOLIC BLOOD PRESSURE: 69 MMHG

## 2021-01-25 DIAGNOSIS — Z86.32 PREVIOUS GESTATIONAL DIABETES MELLITUS, ANTEPARTUM, SECOND TRIMESTER: ICD-10-CM

## 2021-01-25 DIAGNOSIS — O23.593 VAGINITIS AFFECTING PREGNANCY IN THIRD TRIMESTER, ANTEPARTUM: ICD-10-CM

## 2021-01-25 DIAGNOSIS — O09.92 ENCOUNTER FOR SUPERVISION OF HIGH RISK PREGNANCY IN SECOND TRIMESTER, ANTEPARTUM: Primary | ICD-10-CM

## 2021-01-25 DIAGNOSIS — O09.292 PREVIOUS GESTATIONAL DIABETES MELLITUS, ANTEPARTUM, SECOND TRIMESTER: ICD-10-CM

## 2021-01-25 DIAGNOSIS — O09.92 ENCOUNTER FOR SUPERVISION OF HIGH RISK PREGNANCY IN SECOND TRIMESTER, ANTEPARTUM: ICD-10-CM

## 2021-01-25 DIAGNOSIS — Z3A.28 28 WEEKS GESTATION OF PREGNANCY: ICD-10-CM

## 2021-01-25 LAB
ABSOLUTE EOS #: 0.13 K/UL (ref 0–0.44)
ABSOLUTE IMMATURE GRANULOCYTE: 0.08 K/UL (ref 0–0.3)
ABSOLUTE LYMPH #: 1.84 K/UL (ref 1.1–3.7)
ABSOLUTE MONO #: 0.53 K/UL (ref 0.1–1.2)
BASOPHILS # BLD: 0 % (ref 0–2)
BASOPHILS ABSOLUTE: 0.03 K/UL (ref 0–0.2)
DIFFERENTIAL TYPE: ABNORMAL
EOSINOPHILS RELATIVE PERCENT: 1 % (ref 1–4)
HCT VFR BLD CALC: 38 % (ref 36.3–47.1)
HEMOGLOBIN: 12.5 G/DL (ref 11.9–15.1)
IMMATURE GRANULOCYTES: 1 %
LYMPHOCYTES # BLD: 17 % (ref 24–43)
MCH RBC QN AUTO: 32.5 PG (ref 25.2–33.5)
MCHC RBC AUTO-ENTMCNC: 32.9 G/DL (ref 28.4–34.8)
MCV RBC AUTO: 98.7 FL (ref 82.6–102.9)
MONOCYTES # BLD: 5 % (ref 3–12)
NRBC AUTOMATED: 0 PER 100 WBC
PDW BLD-RTO: 13.1 % (ref 11.8–14.4)
PLATELET # BLD: 266 K/UL (ref 138–453)
PLATELET ESTIMATE: ABNORMAL
PMV BLD AUTO: 9.8 FL (ref 8.1–13.5)
RBC # BLD: 3.85 M/UL (ref 3.95–5.11)
RBC # BLD: ABNORMAL 10*6/UL
SEG NEUTROPHILS: 76 % (ref 36–65)
SEGMENTED NEUTROPHILS ABSOLUTE COUNT: 8.25 K/UL (ref 1.5–8.1)
WBC # BLD: 10.9 K/UL (ref 3.5–11.3)
WBC # BLD: ABNORMAL 10*3/UL

## 2021-01-25 PROCEDURE — G8417 CALC BMI ABV UP PARAM F/U: HCPCS | Performed by: CLINICAL NURSE SPECIALIST

## 2021-01-25 PROCEDURE — 99213 OFFICE O/P EST LOW 20 MIN: CPT | Performed by: CLINICAL NURSE SPECIALIST

## 2021-01-25 PROCEDURE — 4004F PT TOBACCO SCREEN RCVD TLK: CPT | Performed by: CLINICAL NURSE SPECIALIST

## 2021-01-25 PROCEDURE — G8427 DOCREV CUR MEDS BY ELIG CLIN: HCPCS | Performed by: CLINICAL NURSE SPECIALIST

## 2021-01-25 PROCEDURE — G8482 FLU IMMUNIZE ORDER/ADMIN: HCPCS | Performed by: CLINICAL NURSE SPECIALIST

## 2021-01-25 RX ORDER — FLUCONAZOLE 100 MG/1
100 TABLET ORAL DAILY
Qty: 7 TABLET | Refills: 0 | Status: SHIPPED | OUTPATIENT
Start: 2021-01-25 | End: 2021-02-01

## 2021-01-25 RX ORDER — METRONIDAZOLE 500 MG/1
500 TABLET ORAL 2 TIMES DAILY
Qty: 14 TABLET | Refills: 0 | Status: SHIPPED | OUTPATIENT
Start: 2021-01-25 | End: 2021-02-01

## 2021-01-25 ASSESSMENT — PATIENT HEALTH QUESTIONNAIRE - PHQ9
SUM OF ALL RESPONSES TO PHQ QUESTIONS 1-9: 0
SUM OF ALL RESPONSES TO PHQ9 QUESTIONS 1 & 2: 0

## 2021-01-25 NOTE — PROGRESS NOTES
Angelo Forte is a 30 y.o. female 30w6d, patient reports that she has had some pinkish spotting when she wipes since 615 this morning, and now she does not have any discharge when wiping. Patient reports that she did have intercourse on Saturday and denies any spotting after intercourse. [id number]    OB History    Para Term  AB Living   7 4 5 0 1 5   SAB TAB Ectopic Molar Multiple Live Births   1 0 0   0 6      # Outcome Date GA Lbr Randy/2nd Weight Sex Delivery Anes PTL Lv   7 Current            6 Term 09/01/15 40w4d  8 lb 8 oz (3.856 kg) M Vag-Spont EPI  MIKE      Birth Comments: Education information given to mother and she verbalizes understanding about the following:  Hour for [employer]. Patient Safety Education. Infant security including the four band system and the HUGS system. Skin to Skin Contact for Yo   5 Term 13 39w0d  7 lb (3.175 kg) F Vag-Spont None N MIKE   4 Term 12 40w0d  7 lb 13 oz (3.544 kg) F Vag-Spont EPI N MIKE      Birth Comments: NO GDM - pitocin induction - AROM   3 Term 11 39w1d  7 lb (3.175 kg) F Vag-Spont EPI N MIKE      Birth Comments: GDM - insulin control   2 Term 09 40w3d  7 lb 8 oz (3.402 kg) M Vag-Spont EPI N MIKE      Birth Comments: GDM - diet controled   1 2007    U    DEC      Birth Comments: 1 st trimester incomplete ab followed by [facility]       Blood pressure 115/69, pulse 77, weight 247 lb (112 kg), last menstrual period 01/10/2020, not currently breastfeeding. The patient was seen and evaluated. There was positive fetal movements. No contractions or leakage of fluid. Signs and symptoms of  labor as well as labor were reviewed. The S/S of Pre-Eclampsia were reviewed with the patient in detail. She is to report any of these if they occur. She currently denies any of these. The patient had her 28 week labs completed.     The patient was instructed on fetal kick counts and was given a kick sheet to complete every 8 hours. She was instructed that the baby should move at a minimum of ten times within one hour after a meal. The patient was instructed to lay down on her left side twenty minutes after eating and count movements for up to one hour with a target value of ten movements. She was instructed to notify the office if she did not make that target after two attempts or if after any attempt there was less than four movements. The patient reports that the targets have been made Yes. Patient Active Problem List    Diagnosis Date Noted    26 weeks gestation of pregnancy 01/12/2021    High-risk pregnancy in second trimester 11/03/2020    Dyslipidemia 10/29/2020    Vitamin D insufficiency 10/29/2020    Exposure to hepatitis C 10/29/2020      [facility] ED on 11/10/2017: With a chief complaint of wanting to be tested for hepatitis C. Patient states the father of her children who she is sexually active with has recently been diagnosed with hepatitis C. She is unsure if he uses IV drugs currently.  Tobacco abuse 10/28/2020    Need for vaccination 10/27/2020    Hx GDMA1 and 2 10/27/2020    Pregestational diabetes mellitus, modified White class B 10/27/2020    Varicose veins of both lower extremities with pain 10/28/2019    BMI 35.0-35.9,adult 10/28/2019    Hepatomegaly 08/15/2019    S/P hernia repair 11/96/7532     Umbilical hernia repair WITH mesh 9/8232      Umbilical hernia 73/10/5311    Right hip pain 09/24/2012    ABC (aneurysmal bone cyst) 09/24/2012    Diet controlled gestational diabetes mellitus (GDM) in second trimester         Diagnosis Orders   1. Encounter for supervision of high risk pregnancy in second trimester, antepartum     2. Hx GDMA1 and 2     3. 28 weeks gestation of pregnancy     Continue prenatal vitamins, increase water intake and frequent rest periods as needed. Fetal kick counts reviewed.   Spec exam reveals cervix is closed and small amount of white discharge, no blood in vaginal vault. The patient will return to the office for her next visit in 1 weeks. See antepartum flow sheet.      Electronically signed by: Ellen Lobo CNP

## 2021-01-25 NOTE — PATIENT INSTRUCTIONS
Patient Education        Counting Your Baby's Kicks: Care Instructions  Your Care Instructions     Counting your baby's kicks is one way your doctor can tell that your baby is healthy. Most women--especially in a first pregnancy--feel their baby move for the first time between 16 and 22 weeks. The movement may feel like flutters rather than kicks. Your baby may move more at certain times of the day. When you are active, you may notice less kicking than when you are resting. At your prenatal visits, your doctor will ask whether the baby is active. In your last trimester, your doctor may ask you to count the number of times you feel your baby move. Follow-up care is a key part of your treatment and safety. Be sure to make and go to all appointments, and call your doctor if you are having problems. It's also a good idea to know your test results and keep a list of the medicines you take. How do you count fetal kicks? · A common method of checking your baby's movement is to count the number of kicks or moves you feel in 1 hour. Ten movements (such as kicks, flutters, or rolls) in 1 hour are normal. Some doctors suggest that you count in the morning until you get to 10 movements. Then you can quit for that day and start again the next day. · Pick your baby's most active time of day to count. This may be any time from morning to evening. · If you do not feel 10 movements in an hour, your baby may be sleeping. Wait for the next hour and count again. When should you call for help? Call your doctor now or seek immediate medical care if:    · You noticed that your baby has stopped moving or is moving much less than normal.   Watch closely for changes in your health, and be sure to contact your doctor if you have any problems. Where can you learn more? Go to https://chant.[employer]. org and sign in to your [employer] account.  Enter G924 in the Ayla box to learn more about \"Counting a lot, sit with your feet up. · Avoid fumes, chemicals, and tobacco smoke. Be sexual in your own way  · Having sex during pregnancy is okay, unless your doctor tells you not to. · You may be very interested in sex, or you may have no interest at all. · Your growing belly can make it hard to find a good position during intercourse. Barber and explore. · You may get cramps in your uterus when your partner touches your breasts. · A back rub may relieve the backache or cramps that sometimes follow orgasm. Learn about  labor  · Watch for signs of  labor. You may be going into labor if:  ? You have menstrual-like cramps, with or without nausea. ? You have about 6 or more contractions in 1 hour, even after you have had a glass of water and are resting. ? You have a low, dull backache that does not go away when you change your position. ? You have pain or pressure in your pelvis that comes and goes in a pattern. ? You have intestinal cramping or flu-like symptoms, with or without diarrhea.  ? You notice an increase or change in your vaginal discharge. Discharge may be heavy, mucus-like, watery, or streaked with blood. ? Your water breaks. · If you think you have  labor:  ? Drink 2 or 3 glasses of water or juice. Not drinking enough fluids can cause contractions. ? Stop what you are doing, and empty your bladder. Then lie down on your left side for at least 1 hour. ? While lying on your side, find your breast bone. Put your fingers in the soft spot just below it. Move your fingers down toward your belly button to find the top of your uterus. Check to see if it is tight. ? Contractions can be weak or strong. Record your contractions for an hour. Time a contraction from the start of one contraction to the start of the next one.  ? Single or several strong contractions without a pattern are called Coosa-Buck contractions. They are practice contractions but not the start of labor.  They often stop if you change what you are doing. ? Call your doctor if you have regular contractions. Where can you learn more? Go to https://chpepiceweb.[employer]. org and sign in to your N(i)Â² account. Enter B116 in the [facility] box to learn more about \"Weeks 26 to 30 of Your Pregnancy: Care Instructions. \"     If you do not have an account, please click on the \"Sign Up Now\" link. Current as of: February 11, 2020               Content Version: 12.6  © 2933-3509 [facility]. Care instructions adapted under license by [facility] ([facility]). If you have questions about a medical condition or this instruction, always ask your healthcare professional. [address] any warranty or liability for your use of this information.

## 2021-01-26 LAB
DIRECT EXAM: NORMAL
Lab: NORMAL
SPECIMEN DESCRIPTION: NORMAL

## 2021-02-02 ENCOUNTER — ROUTINE PRENATAL (OUTPATIENT)
Dept: OBGYN CLINIC | Age: 36
End: 2021-02-02
Payer: COMMERCIAL

## 2021-02-02 VITALS
DIASTOLIC BLOOD PRESSURE: 90 MMHG | SYSTOLIC BLOOD PRESSURE: 144 MMHG | WEIGHT: 245 LBS | HEART RATE: 84 BPM | BODY MASS INDEX: 40.77 KG/M2

## 2021-02-02 DIAGNOSIS — O24.319 PREGESTATIONAL DIABETES MELLITUS, MODIFIED WHITE CLASS B: ICD-10-CM

## 2021-02-02 DIAGNOSIS — O24.410 DIET CONTROLLED GESTATIONAL DIABETES MELLITUS (GDM) IN SECOND TRIMESTER: ICD-10-CM

## 2021-02-02 DIAGNOSIS — Z3A.29 29 WEEKS GESTATION OF PREGNANCY: Primary | ICD-10-CM

## 2021-02-02 PROCEDURE — 2022F DILAT RTA XM EVC RTNOPTHY: CPT | Performed by: SPECIALIST

## 2021-02-02 PROCEDURE — G8482 FLU IMMUNIZE ORDER/ADMIN: HCPCS | Performed by: SPECIALIST

## 2021-02-02 PROCEDURE — 99213 OFFICE O/P EST LOW 20 MIN: CPT | Performed by: SPECIALIST

## 2021-02-02 PROCEDURE — G8417 CALC BMI ABV UP PARAM F/U: HCPCS | Performed by: SPECIALIST

## 2021-02-02 PROCEDURE — 3046F HEMOGLOBIN A1C LEVEL >9.0%: CPT | Performed by: SPECIALIST

## 2021-02-02 PROCEDURE — G8427 DOCREV CUR MEDS BY ELIG CLIN: HCPCS | Performed by: SPECIALIST

## 2021-02-02 PROCEDURE — 4004F PT TOBACCO SCREEN RCVD TLK: CPT | Performed by: SPECIALIST

## 2021-02-02 NOTE — PROGRESS NOTES
Bessy Moreno is a 30 y.o. female 33w8d        OB History    Para Term  AB Living   7 4 5 0 1 5   SAB TAB Ectopic Molar Multiple Live Births   1 0 0   0 6      # Outcome Date GA Lbr Randy/2nd Weight Sex Delivery Anes PTL Lv   7 Current            6 Term 09/01/15 40w4d  8 lb 8 oz (3.856 kg) M Vag-Spont EPI  MIKE      Birth Comments: Education information given to mother and she verbalizes understanding about the following:  Hour for [employer]. Patient Safety Education. Infant security including the four band system and the HUGS system. Skin to Skin Contact for Yo   5 Term 13 39w0d  7 lb (3.175 kg) F Vag-Spont None N MIKE   4 Term 12 40w0d  7 lb 13 oz (3.544 kg) F Vag-Spont EPI N MIKE      Birth Comments: NO GDM - pitocin induction - AROM   3 Term 11 39w1d  7 lb (3.175 kg) F Vag-Spont EPI N MIKE      Birth Comments: GDM - insulin control   2 Term 09 40w3d  7 lb 8 oz (3.402 kg) M Vag-Spont EPI N MIKE      Birth Comments: GDM - diet controled   1 SAB     U    DEC      Birth Comments: 1 st trimester incomplete ab followed by [facility]        Chief Complaint   Patient presents with    High Risk Pregnancy        Blood pressure (!) 144/90, pulse 84, weight 245 lb (111.1 kg), last menstrual period 01/10/2020, not currently breastfeeding. The patient was seen and evaluated. There was positive fetal movements. No contractions or leakage of fluid. Signs and symptoms of  labor as well as labor were reviewed. The S/S of Pre-Eclampsia were reviewed with the patient in detail. She is to report any of these if they occur. She currently denies any of these. The patient had her 28 week labs completed. The patient was instructed on fetal kick counts and was given a kick sheet to complete every 8 hours.  She was instructed that the baby should move at a minimum of ten times within one hour after a meal. The patient was instructed to lay down on her left side twenty minutes after eating and count movements for up to one hour with a target value of ten movements. She was instructed to notify the office if she did not make that target after two attempts or if after any attempt there was less than four movements. The patient reports that the targets have been made Yes. Patient Active Problem List    Diagnosis Date Noted    29 weeks gestation of pregnancy 02/02/2021    26 weeks gestation of pregnancy 01/12/2021    High-risk pregnancy in second trimester 11/03/2020    Dyslipidemia 10/29/2020    Vitamin D insufficiency 10/29/2020    Exposure to hepatitis C 10/29/2020      [facility] ED on 11/10/2017: With a chief complaint of wanting to be tested for hepatitis C. Patient states the father of her children who she is sexually active with has recently been diagnosed with hepatitis C. She is unsure if he uses IV drugs currently.  Tobacco abuse 10/28/2020    Need for vaccination 10/27/2020    Hx GDMA1 and 2 10/27/2020    Pregestational diabetes mellitus, modified White class B 10/27/2020    Varicose veins of both lower extremities with pain 10/28/2019    BMI 35.0-35.9,adult 10/28/2019    Hepatomegaly 08/15/2019    S/P hernia repair 10/30/7030     Umbilical hernia repair WITH mesh 7/9995      Umbilical hernia 28/35/2285    Right hip pain 09/24/2012    ABC (aneurysmal bone cyst) 09/24/2012    Diet controlled gestational diabetes mellitus (GDM) in second trimester         Diagnosis Orders   1. 29 weeks gestation of pregnancy     2. Diet controlled gestational diabetes mellitus (GDM) in second trimester     3. Pregestational diabetes mellitus, modified White class B         Patient with gestational diabetes. She says she has an appointment with [facility] on Thursday and she was told to keep that appointment. Fetal heart rate ausculted at 161 bpm and fundal height is 32 cm. today. Patient advised to continue taking prenatal vitamins and to rest as necessary. Patient was told to stay following visit, but left the office before her blood pressure could be repeated today. The patient will return to the office for her next visit in 1 week. See antepartum flow sheet. Michael Lopez am scribing for, and in the presence of Dr. Juan J Alvarado. Electronically signed by: Shikha Paredes 2/2/21 3:46 PM   I agree to the above documentation placed by my scribe Shikha Paredes. I reviewed the scribe's note and agree with the documented findings and plan of care. Any areas of disagreement are noted on the chart. I have personally evaluated this patient. Additional findings are as noted. I agree with the chief complaint, past medical history, past surgical history, allergies, medications, social and family history as documented unless otherwise noted below.      Electronically signed by Juan J Alvarado MD on 2/3/2021 at 1:54 AM

## 2021-02-09 ENCOUNTER — ROUTINE PRENATAL (OUTPATIENT)
Dept: OBGYN CLINIC | Age: 36
End: 2021-02-09
Payer: COMMERCIAL

## 2021-02-09 VITALS
BODY MASS INDEX: 40.9 KG/M2 | HEART RATE: 85 BPM | DIASTOLIC BLOOD PRESSURE: 71 MMHG | SYSTOLIC BLOOD PRESSURE: 114 MMHG | WEIGHT: 245.8 LBS

## 2021-02-09 DIAGNOSIS — Z3A.30 30 WEEKS GESTATION OF PREGNANCY: ICD-10-CM

## 2021-02-09 DIAGNOSIS — Z86.32 PREVIOUS GESTATIONAL DIABETES MELLITUS, ANTEPARTUM, SECOND TRIMESTER: ICD-10-CM

## 2021-02-09 DIAGNOSIS — O09.93 HIGH-RISK PREGNANCY IN THIRD TRIMESTER: Primary | ICD-10-CM

## 2021-02-09 DIAGNOSIS — O09.292 PREVIOUS GESTATIONAL DIABETES MELLITUS, ANTEPARTUM, SECOND TRIMESTER: ICD-10-CM

## 2021-02-09 PROBLEM — Z3A.29 29 WEEKS GESTATION OF PREGNANCY: Status: RESOLVED | Noted: 2021-02-02 | Resolved: 2021-02-09

## 2021-02-09 PROBLEM — Z3A.26 26 WEEKS GESTATION OF PREGNANCY: Status: RESOLVED | Noted: 2021-01-12 | Resolved: 2021-02-09

## 2021-02-09 PROCEDURE — 4004F PT TOBACCO SCREEN RCVD TLK: CPT | Performed by: CLINICAL NURSE SPECIALIST

## 2021-02-09 PROCEDURE — G8417 CALC BMI ABV UP PARAM F/U: HCPCS | Performed by: CLINICAL NURSE SPECIALIST

## 2021-02-09 PROCEDURE — G8427 DOCREV CUR MEDS BY ELIG CLIN: HCPCS | Performed by: CLINICAL NURSE SPECIALIST

## 2021-02-09 PROCEDURE — G8482 FLU IMMUNIZE ORDER/ADMIN: HCPCS | Performed by: CLINICAL NURSE SPECIALIST

## 2021-02-09 PROCEDURE — 99213 OFFICE O/P EST LOW 20 MIN: CPT | Performed by: CLINICAL NURSE SPECIALIST

## 2021-02-09 NOTE — PROGRESS NOTES
Anthonette Rinne is a [address] [place] [address] y.o. female 27w9d        OB History    Para Term  AB Living   7 4 5 0 1 5   SAB TAB Ectopic Molar Multiple Live Births   1 0 0   0 6      # Outcome Date GA Lbr Randy/2nd Weight Sex Delivery Anes PTL Lv   7 Current            6 Term 09/01/15 40w4d  8 lb 8 oz (3.856 kg) M Vag-Spont EPI  MIKE      Birth Comments: Education information given to mother and she verbalizes understanding about the following:  Hour for [employer]. Patient Safety Education. Infant security including the four band system and the HUGS system. Skin to Skin Contact for Yo   5 Term 13 39w0d  7 lb (3.175 kg) F Vag-Spont None N MIKE   4 Term 12 40w0d  7 lb 13 oz (3.544 kg) F Vag-Spont EPI N MIKE      Birth Comments: NO GDM - pitocin induction - AROM   3 Term 11 39w1d  7 lb (3.175 kg) F Vag-Spont EPI N MIKE      Birth Comments: GDM - insulin control   2 Term 09 40w3d  7 lb 8 oz (3.402 kg) M Vag-Spont EPI N MIKE      Birth Comments: GDM - diet controled   1 SAB     U    DEC      Birth Comments: 1 st trimester incomplete ab followed by [facility]       Blood pressure 114/71, pulse 85, weight 245 lb 12.8 oz (111.5 kg), last menstrual period 01/10/2020, not currently breastfeeding. The patient was seen and evaluated. There was positive fetal movements. No contractions or leakage of fluid. Signs and symptoms of  labor as well as labor were reviewed. The S/S of Pre-Eclampsia were reviewed with the patient in detail. She is to report any of these if they occur. She currently denies any of these. The patient had her 28 week labs completed. The patient was instructed on fetal kick counts and was given a kick sheet to complete every 8 hours.  She was instructed that the baby should move at a minimum of ten times within one hour after a meal. The patient was instructed to lay down on her left side twenty minutes after eating and count movements for up to one hour with a target value of ten movements. She was instructed to notify the office if she did not make that target after two attempts or if after any attempt there was less than four movements. The patient reports that the targets have been made Yes. Patient Active Problem List    Diagnosis Date Noted    High-risk pregnancy in second trimester 11/03/2020    Dyslipidemia 10/29/2020    Vitamin D insufficiency 10/29/2020    Exposure to hepatitis C 10/29/2020      [facility] ED on 11/10/2017: With a chief complaint of wanting to be tested for hepatitis C. Patient states the father of her children who she is sexually active with has recently been diagnosed with hepatitis C. She is unsure if he uses IV drugs currently.  Tobacco abuse 10/28/2020    Need for vaccination 10/27/2020    Hx GDMA1 and 2 10/27/2020    Pregestational diabetes mellitus, modified White class B 10/27/2020    Varicose veins of both lower extremities with pain 10/28/2019    BMI 35.0-35.9,adult 10/28/2019    Hepatomegaly 08/15/2019    S/P hernia repair 80/82/5664     Umbilical hernia repair WITH mesh 2/1400      Umbilical hernia 10/77/2130    Right hip pain 09/24/2012    ABC (aneurysmal bone cyst) 09/24/2012    Diet controlled gestational diabetes mellitus (GDM) in second trimester         Diagnosis Orders   1. High-risk pregnancy in third trimester     2. Hx GDMA1 and 2     3. 30 weeks gestation of pregnancy     Continue prenatal vitamins, increase water intake and frequent rest periods as needed. Fetal kick counts reviewed. The patient will return to the office for her next visit in 1 weeks. See antepartum flow sheet.      Electronically signed by: Jason Hughes CNP

## 2021-02-09 NOTE — PATIENT INSTRUCTIONS
Patient Education        Weeks 30 to 28 of Your Pregnancy: Care Instructions  Your Care Instructions     You have made it to the final months of your pregnancy. By now, your baby is really starting to look like a baby, with hair and plump skin. As you enter the final weeks of pregnancy, the reality of having a baby may start to set in. This is the time to settle on a name, get your household in order, set up a safe nursery, and find quality  if needed. Doing these things in advance will allow you to focus on caring for and enjoying your new baby. You may also want to have a tour of your hospital's labor and delivery unit to get a better idea of what to expect while you are in the hospital.  During these last months, it is very important to take good care of yourself and pay attention to what your body needs. If your doctor says it is okay for you to work, don't push yourself too hard. Use the tips provided in this care sheet to ease heartburn and care for varicose veins. If you haven't already had the Tdap shot during this pregnancy, talk to your doctor about getting it. It will help protect your  against pertussis infection. Follow-up care is a key part of your treatment and safety. Be sure to make and go to all appointments, and call your doctor if you are having problems. It's also a good idea to know your test results and keep a list of the medicines you take. How can you care for yourself at home? Pay attention to your baby's movements  · You should feel your baby move several times every day. · Your baby now turns less, and kicks and jabs more. · Your baby sleeps 20 to 45 minutes at a time and is more active at certain times of day. · If your doctor wants you to count your baby's kicks:  ? Empty your bladder, and lie on your side or relax in a comfortable chair. ? Write down your start time. ? Pay attention only to your baby's movements. Count any movement except hiccups. ?  After you have counted 10 movements, write down your stop time. ? Write down how many minutes it took for your baby to move 10 times. ? If an hour goes by and you have not recorded 10 movements, have something to eat or drink and then count for another hour. If you do not record 10 movements in either hour, call your doctor. Ease heartburn  · Eat small, frequent meals. · Do not eat chocolate, peppermint, or very spicy foods. Avoid drinks with caffeine, such as coffee, tea, and sodas. · Avoid bending over or lying down after meals. · Talk a short walk after you eat. · If heartburn is a problem at night, do not eat for 2 hours before bedtime. · Take antacids like Mylanta, Maalox, Rolaids, or Tums. Do not take antacids that have sodium bicarbonate. Care for varicose veins  · Varicose veins are blood vessels that stretch out with the extra blood during pregnancy. Your legs may ache or throb. Most varicose veins will go away after the birth. · Avoid standing for long periods of time. Sit with your legs crossed at the ankles, not the knees. · Sit with your feet propped up. · Avoid tight clothing or stockings. Wear support hose. · Exercise regularly. Try walking for at least 30 minutes a day. Where can you learn more? Go to [email or web address]://[email or web address] org and sign in to your [employer] account. Enter U108 in the [facility] box to learn more about \"Weeks 30 to 32 of Your Pregnancy: Care Instructions. \"     If you do not have an account, please click on the \"Sign Up Now\" link. Current as of: February 11, 2020               Content Version: 12.6  © 1455-3394 [employer]. Care instructions adapted under license by [facility] ([facility]). If you have questions about a medical condition or this instruction, always ask your healthcare professional. [address] any warranty or liability for your use of this information.          Patient Education        Learning About Fetal Heart Monitoring  What is fetal heart monitoring? Fetal heart monitoring keeps track of the heart rate of your unborn baby (fetus). It also shows how long and how strong your contractions are. It is done during pregnancy, labor, and delivery. This technique helps your doctor see if your unborn baby is healthy or is having any problems. It may help your doctor find the best way to deliver your baby. The monitoring can be external or internal. External monitoring can be done anytime after 20 weeks of pregnancy. Internal monitoring is done only during labor. How is it done? In most cases, you'll lie on a table on your back or left side. · In external monitoring, two belts are placed around your belly. One belt holds a sensor that keeps track of your baby's heart rate. The other belt also holds a sensor. It measures the timing of your contractions. · In internal monitoring, your doctor guides a thin wire through your vagina and cervix. Your doctor attaches it to your baby's scalp. A small tube is also guided through your vagina. This connects a device that measures your baby's heart rate and the timing and strength of your contractions. The sensors or the wire and tube connect to a recording device. This device can show or print out a record of your baby's heart rate and how long and strong your contractions are. The doctor will watch your baby's heart rate and how it responds to your contractions. Why is the test done? Fetal heart monitoring helps your doctor get a sense of how the baby is doing. It helps the doctor know if there is a problem that needs to be fixed. And it helps the doctor keep track of your baby's heart rate during labor and delivery. Follow-up care is a key part of your treatment and safety. Be sure to make and go to all appointments, and call your doctor if you are having problems. It's also a good idea to know your test results and keep a list of the medicines you take.   Where can you learn more? Go to https://chpepiceweb.Volofy. org and sign in to your [employer] account. Enter L490 in the [facility] box to learn more about \"Learning About Fetal Heart Monitoring. \"     If you do not have an account, please click on the \"Sign Up Now\" link. Current as of: February 11, 2020               Content Version: 12.6  © 2006-2020 [employer]. Care instructions adapted under license by [facility] ([facility]). If you have questions about a medical condition or this instruction, always ask your healthcare professional. [address] any warranty or liability for your use of this information. Patient Education        Biophysical Profile: About This Test  What is it? A biophysical profile (BPP) measures the health of your baby during your pregnancy. The BPP checks your baby's heart rate, muscle tone, movement, and breathing. It also measures the amount of amniotic fluid around your baby. Looking at these five areas helps your doctor know how well your baby is doing. Why is this test done? A BPP is often done in the last trimester of pregnancy when there is any question about how the baby is doing. Some women with high-risk pregnancies may have a BPP test every week or twice a week during later pregnancy. How do you prepare for the test?  · If you smoke, you will be asked to stop smoking for 2 hours before testing. This is because smoking affects the baby's heart rate and movements. · You may be asked to drink water or other liquids just before testing. You will be able to empty your bladder after the test.  How is the test done? The BPP has two parts. First you have a nonstress test, and then you have a fetal ultrasound. For the tests, you will lie back on a padded exam table. If you become short of breath or lightheaded while lying on your back, say so. The technician can help you change your position.   Nonstress test  · Two elastic belts with sensors are placed across your belly. One sensor tracks your baby's heart rate with reflected sound waves (Doppler ultrasound). The other sensor measures how long your contractions are, if you are having any. · You may hear your baby's heartbeat as a beeping sound. You may see it printed out on a chart. · You may be asked to push a button on the machine when your baby moves or you have a contraction. This helps your doctor look at how your baby's heart reacts to movement and contractions. · If there isn't much movement, it may be because the baby is asleep. If this happens during your test, the technician may try to wake the baby with a loud noise or by having you eat or drink something. Fetal ultrasound  · A gel will be spread on your belly. This helps the passage of sound waves. · A small, handheld sensor will be pressed against the gel on your skin and moved across your belly a few times. · You may be able to watch the screen to see the picture of your baby during the test.  How long does the test take? · The nonstress test will take about 20 to 40 minutes. · The fetal ultrasound will take about 30 to 60 minutes. What happens after the test?  · The doctor will read the results from the test and talk to you about them. You will also find out if more testing is needed. · You will probably be able to go home right away. It depends on the reason for the test.  · You can go back to your usual activities right away. Follow-up care is a key part of your treatment and safety. Be sure to make and go to all appointments, and call your doctor if you are having problems. It's also a good idea to keep a list of the medicines you take. Ask your doctor when you can expect to have your test results. Where can you learn more? Go to https://cheliazareweb.[email or web address] org and sign in to your [employer] account.  Enter W210 in the [employer] box to learn more about \"Biophysical Profile: About This Test.\"

## 2021-02-15 ENCOUNTER — ROUTINE PRENATAL (OUTPATIENT)
Dept: PERINATAL CARE | Age: 36
End: 2021-02-15
Payer: COMMERCIAL

## 2021-02-15 ENCOUNTER — TELEPHONE (OUTPATIENT)
Dept: PERINATAL CARE | Age: 36
End: 2021-02-15

## 2021-02-15 VITALS
SYSTOLIC BLOOD PRESSURE: 118 MMHG | HEART RATE: 92 BPM | RESPIRATION RATE: 16 BRPM | WEIGHT: 247 LBS | HEIGHT: 65 IN | DIASTOLIC BLOOD PRESSURE: 72 MMHG | BODY MASS INDEX: 41.15 KG/M2 | TEMPERATURE: 97 F

## 2021-02-15 DIAGNOSIS — O24.119 PRE-EXISTING TYPE 2 DIABETES MELLITUS DURING PREGNANCY, ANTEPARTUM: Primary | ICD-10-CM

## 2021-02-15 DIAGNOSIS — Z36.4 ANTENATAL SCREENING FOR FETAL GROWTH RETARDATION USING ULTRASONICS: ICD-10-CM

## 2021-02-15 DIAGNOSIS — O99.213 OBESITY AFFECTING PREGNANCY IN THIRD TRIMESTER: ICD-10-CM

## 2021-02-15 DIAGNOSIS — O09.43 GRAND MULTIPARITY WITH CURRENT PREGNANCY IN THIRD TRIMESTER: ICD-10-CM

## 2021-02-15 DIAGNOSIS — Z13.89 ENCOUNTER FOR ROUTINE SCREENING FOR MALFORMATION USING ULTRASONICS: ICD-10-CM

## 2021-02-15 DIAGNOSIS — Z3A.31 31 WEEKS GESTATION OF PREGNANCY: ICD-10-CM

## 2021-02-15 DIAGNOSIS — O99.330 TOBACCO USE DISORDER COMPLICATING PREGNANCY, CHILDBIRTH, OR PUERPERIUM, ANTEPARTUM: ICD-10-CM

## 2021-02-15 PROCEDURE — 2022F DILAT RTA XM EVC RTNOPTHY: CPT | Performed by: OBSTETRICS & GYNECOLOGY

## 2021-02-15 PROCEDURE — 76805 OB US >/= 14 WKS SNGL FETUS: CPT | Performed by: OBSTETRICS & GYNECOLOGY

## 2021-02-15 PROCEDURE — G8417 CALC BMI ABV UP PARAM F/U: HCPCS | Performed by: OBSTETRICS & GYNECOLOGY

## 2021-02-15 PROCEDURE — 99212 OFFICE O/P EST SF 10 MIN: CPT | Performed by: OBSTETRICS & GYNECOLOGY

## 2021-02-15 PROCEDURE — 3046F HEMOGLOBIN A1C LEVEL >9.0%: CPT | Performed by: OBSTETRICS & GYNECOLOGY

## 2021-02-15 PROCEDURE — G8427 DOCREV CUR MEDS BY ELIG CLIN: HCPCS | Performed by: OBSTETRICS & GYNECOLOGY

## 2021-02-15 PROCEDURE — 4004F PT TOBACCO SCREEN RCVD TLK: CPT | Performed by: OBSTETRICS & GYNECOLOGY

## 2021-02-15 PROCEDURE — 93325 DOPPLER ECHO COLOR FLOW MAPG: CPT | Performed by: OBSTETRICS & GYNECOLOGY

## 2021-02-15 PROCEDURE — G8482 FLU IMMUNIZE ORDER/ADMIN: HCPCS | Performed by: OBSTETRICS & GYNECOLOGY

## 2021-02-15 PROCEDURE — 76826 ECHO EXAM OF FETAL HEART: CPT | Performed by: OBSTETRICS & GYNECOLOGY

## 2021-02-15 PROCEDURE — 76819 FETAL BIOPHYS PROFIL W/O NST: CPT | Performed by: OBSTETRICS & GYNECOLOGY

## 2021-02-15 PROCEDURE — 76827 ECHO EXAM OF FETAL HEART: CPT | Performed by: OBSTETRICS & GYNECOLOGY

## 2021-02-15 NOTE — PROGRESS NOTES
The patient currently has no active complaints. The patient states active fetal movements today. The patient denies any vaginal bleeding, abdominal cramping, back pain, leakage of fluid or pelvic pressure and states that she is otherwise doing well. The patient has no other complaints today. The patient is awake, alert and in no acute distress. She is oriented to person, place and time. The patient is well-groomed and has a normal appearance. Please refer to the completed vital signs. ASSESSMENT AND PLAN:  I have reviewed the following with the patient today:      1. I have reviewed the patient's blood sugars with her today. Blood sugars reviewed (insulin regimen initiated, as below). Insulin regimen started: 6 units of subcutaneous (SQ) Novolog before lunch and dinner (elevated postprandial meal values). 2. As per [facility], [place], [facility] Guidance for COVID-19, [facility] (4052), doi: https://doi.org/10.1016/j.ajogmf.2020.794688, the patient should receive weekly non-stress test evaluations in your office for the duration of the pregnancy. 3. Fetal  surveillance (including non-stress testing, amniotic fluid volume calculation, biophysical profile score evaluation, assessment of umbilical artery Doppler wave forms, and fetal middle cerebral artery peak systolic velocity Doppler wave forms, etc.) does not prevent unexplained stillbirths that may still result from acute and unpredictable obstetrical events, such as placental abruption, umbilical cord prolapse, umbilical cord accidents, etc.      4. Fetal kick count recommendations were reviewed with the patient today. The patient was also provided  labor and preeclamptic precautions as well.       5. For the remainder of the patient's medical and obstetrical complications of pregnancy, please refer to all previous medical records, consultation letters, ultrasound evaluations, etc. from the Maternal-Fetal Medicine office. Between 10 to 15-minute for an established patient face-to-face follow-up visit was required in the Maternal-Fetal Medicine office today for the above maternal glycemic review and to address the remaining fetal/maternal medical/obstetrical complications of pregnancy. Thank you very much for this referral.  We will continue to follow this patient with you.

## 2021-02-15 NOTE — TELEPHONE ENCOUNTER
novolog 6 units before lunch and dinner. Insulin syringes/pen needles. One month supplyx one refills. Script called into [facility] pharmacy [id number].  Dilshad Gamboa

## 2021-03-04 ENCOUNTER — ROUTINE PRENATAL (OUTPATIENT)
Dept: OBGYN CLINIC | Age: 36
End: 2021-03-04
Payer: COMMERCIAL

## 2021-03-04 VITALS
TEMPERATURE: 97 F | SYSTOLIC BLOOD PRESSURE: 129 MMHG | HEART RATE: 79 BPM | BODY MASS INDEX: 41.44 KG/M2 | WEIGHT: 249 LBS | DIASTOLIC BLOOD PRESSURE: 73 MMHG

## 2021-03-04 DIAGNOSIS — O24.410 DIET CONTROLLED GESTATIONAL DIABETES MELLITUS (GDM) IN SECOND TRIMESTER: ICD-10-CM

## 2021-03-04 DIAGNOSIS — Z3A.34 34 WEEKS GESTATION OF PREGNANCY: Primary | ICD-10-CM

## 2021-03-04 DIAGNOSIS — E66.01 MORBID OBESITY WITH BMI OF 40.0-44.9, ADULT (HCC): ICD-10-CM

## 2021-03-04 LAB
ACCELERATIONS > 10BPM: NORMAL
ACCELERATIONS > 15 BPM: 4
ACOUSTIC STIMULATION: NO
DECELERATIONS: NORMAL
FHR VARIABILITIES: NORMAL
NST ASSESSMENT: REACTIVE
NST BASELINE: 135
NST DURATION: 20 MINUTES
NST INDICATIONS: NORMAL
NST LOCATIONS: NORMAL
NST READ BY: NORMAL
NST RETURN: NORMAL
UTERINE ACTIVITY: NO

## 2021-03-04 PROCEDURE — G8417 CALC BMI ABV UP PARAM F/U: HCPCS | Performed by: SPECIALIST

## 2021-03-04 PROCEDURE — G8427 DOCREV CUR MEDS BY ELIG CLIN: HCPCS | Performed by: SPECIALIST

## 2021-03-04 PROCEDURE — 59025 FETAL NON-STRESS TEST: CPT | Performed by: SPECIALIST

## 2021-03-04 PROCEDURE — 4004F PT TOBACCO SCREEN RCVD TLK: CPT | Performed by: SPECIALIST

## 2021-03-04 PROCEDURE — 99213 OFFICE O/P EST LOW 20 MIN: CPT | Performed by: SPECIALIST

## 2021-03-04 PROCEDURE — G8482 FLU IMMUNIZE ORDER/ADMIN: HCPCS | Performed by: SPECIALIST

## 2021-03-04 RX ORDER — INSULIN ASPART 100 [IU]/ML
6 INJECTION, SOLUTION INTRAVENOUS; SUBCUTANEOUS 2 TIMES DAILY
COMMUNITY
Start: 2021-02-15 | End: 2021-05-13

## 2021-03-04 NOTE — PROGRESS NOTES
Anne-Marie Gong is a 30 y.o. female 34w3d        OB History    Para Term  AB Living   7 4 5 0 1 5   SAB TAB Ectopic Molar Multiple Live Births   1 0 0   0 6      # Outcome Date GA Lbr Randy/2nd Weight Sex Delivery Anes PTL Lv   7 Current            6 Term 09/01/15 40w4d  8 lb 8 oz (3.856 kg) M Vag-Spont EPI  MIKE      Birth Comments: Education information given to mother and she verbalizes understanding about the following:  Hour for [employer]. Patient Safety Education. Infant security including the four band system and the HUGS system. Skin to Skin Contact for Yo   5 Term 13 39w0d  7 lb (3.175 kg) F Vag-Spont None N MIKE   4 Term 12 40w0d  7 lb 13 oz (3.544 kg) F Vag-Spont EPI N MIKE      Birth Comments: NO GDM - pitocin induction - AROM   3 Term 11 39w1d  7 lb (3.175 kg) F Vag-Spont EPI N MIKE      Birth Comments: GDM - insulin control   2 Term 09 40w3d  7 lb 8 oz (3.402 kg) M Vag-Spont EPI N MIKE      Birth Comments: GDM - diet controled   1 SAB     U    DEC      Birth Comments: 1 st trimester incomplete ab followed by [facility]        Chief Complaint   Patient presents with    Routine Prenatal Visit     Patient is 34 weeks and 1 day gestation and is here for supervision of high risk pregnancy.  High Risk Pregnancy    Gestational Diabetes    Non-stress Test        Blood pressure 129/73, pulse 79, temperature 97 °F (36.1 °C), temperature source Temporal, weight 249 lb (112.9 kg), last menstrual period 01/10/2020, not currently breastfeeding. The patient was seen and evaluated. There was positive fetal movements. No contractions or leakage of fluid. Signs and symptoms of  labor as well as labor were reviewed. The S/S of Pre-Eclampsia were reviewed with the patient in detail. She is to report any of these if they occur. She currently denies any of these. The patient had her 28 week labs completed.     The patient was instructed on fetal kick counts and was given a kick sheet to complete every 8 hours. She was instructed that the baby should move at a minimum of ten times within one hour after a meal. The patient was instructed to lay down on her left side twenty minutes after eating and count movements for up to one hour with a target value of ten movements. She was instructed to notify the office if she did not make that target after two attempts or if after any attempt there was less than four movements. The patient reports that the targets have been made Yes. Patient Active Problem List    Diagnosis Date Noted    34 weeks gestation of pregnancy 03/04/2021    Morbid obesity with BMI of 40.0-44.9, adult ([facility] [address] 03/04/2021    High-risk pregnancy in second trimester 11/03/2020    Dyslipidemia 10/29/2020    Vitamin D insufficiency 10/29/2020    Exposure to hepatitis C 10/29/2020      [facility]2017: With a chief complaint of wanting to be tested for hepatitis C. Patient states the father of her children who she is sexually active with has recently been diagnosed with hepatitis C. She is unsure if he uses IV drugs currently.  Tobacco abuse 10/28/2020    Need for vaccination 10/27/2020    Hx GDMA1 and 2 10/27/2020    Pregestational diabetes mellitus, modified White class B 10/27/2020    Varicose veins of both lower extremities with pain 10/28/2019    BMI 35.0-35.9,adult 10/28/2019    Hepatomegaly 08/15/2019    S/P hernia repair 00/27/1404     Umbilical hernia repair WITH mesh 2/6787      Umbilical hernia 84/89/3011    Right hip pain 09/24/2012    ABC (aneurysmal bone cyst) 09/24/2012    Diet controlled gestational diabetes mellitus (GDM) in second trimester         Diagnosis Orders   1. 34 weeks gestation of pregnancy     2. Diet controlled gestational diabetes mellitus (GDM) in second trimester     3. Morbid obesity with BMI of 40.0-44.9, adult [facility])         Patient doing well.   NST done today is reactive (see procedures tab for detail result). Fetal heart rate per NST baseline is 135 bpm and fundal height is 35 cm. today. Patient advised to continue taking prenatal vitamins and to rest as necessary. Patient was advised that she should consider stopping work between 28 and 39 gestational weeks. The patient will return to the office for her next visit in 3 days. See antepartum flow sheet. Lola Page am scribing for, and in the presence of Dr. Erik Dubois. Electronically signed by: Isabell Saavedra 3/4/21 5:01 PM   I agree to the above documentation placed by my scribe Isabell Saavedra. I reviewed the scribe's note and agree with the documented findings and plan of care. Any areas of disagreement are noted on the chart. I have personally evaluated this patient. Additional findings are as noted. I agree with the chief complaint, past medical history, past surgical history, allergies, medications, social and family history as documented unless otherwise noted below.      Electronically signed by Erik Dubois MD on 3/5/2021 at 12:56 AM

## 2021-03-11 ENCOUNTER — ROUTINE PRENATAL (OUTPATIENT)
Dept: OBGYN CLINIC | Age: 36
End: 2021-03-11
Payer: COMMERCIAL

## 2021-03-11 VITALS
WEIGHT: 245.4 LBS | TEMPERATURE: 98.6 F | HEART RATE: 86 BPM | BODY MASS INDEX: 40.84 KG/M2 | SYSTOLIC BLOOD PRESSURE: 121 MMHG | DIASTOLIC BLOOD PRESSURE: 69 MMHG

## 2021-03-11 DIAGNOSIS — O24.414 INSULIN CONTROLLED GESTATIONAL DIABETES MELLITUS (GDM) IN THIRD TRIMESTER: ICD-10-CM

## 2021-03-11 DIAGNOSIS — Z3A.35 35 WEEKS GESTATION OF PREGNANCY: ICD-10-CM

## 2021-03-11 DIAGNOSIS — O09.93 ENCOUNTER FOR SUPERVISION OF HIGH RISK PREGNANCY IN THIRD TRIMESTER, ANTEPARTUM: Primary | ICD-10-CM

## 2021-03-11 DIAGNOSIS — O24.410 DIET CONTROLLED GESTATIONAL DIABETES MELLITUS (GDM) IN SECOND TRIMESTER: ICD-10-CM

## 2021-03-11 DIAGNOSIS — Z3A.34 34 WEEKS GESTATION OF PREGNANCY: ICD-10-CM

## 2021-03-11 PROBLEM — O09.92 HIGH-RISK PREGNANCY IN SECOND TRIMESTER: Status: RESOLVED | Noted: 2020-11-03 | Resolved: 2021-03-11

## 2021-03-11 LAB
ACCELERATIONS > 10BPM: NORMAL
ACCELERATIONS > 15 BPM: 6
ACOUSTIC STIMULATION: NO
DECELERATIONS: NORMAL
FHR VARIABILITIES: NORMAL
NST ASSESSMENT: REACTIVE
NST BASELINE: 140
NST DURATION: 20 MINUTES
NST INDICATIONS: NORMAL
NST LOCATIONS: NORMAL
NST READ BY: NORMAL
NST RETURN: NORMAL
UTERINE ACTIVITY: NO

## 2021-03-11 PROCEDURE — 99213 OFFICE O/P EST LOW 20 MIN: CPT | Performed by: CLINICAL NURSE SPECIALIST

## 2021-03-11 PROCEDURE — G8482 FLU IMMUNIZE ORDER/ADMIN: HCPCS | Performed by: CLINICAL NURSE SPECIALIST

## 2021-03-11 PROCEDURE — 4004F PT TOBACCO SCREEN RCVD TLK: CPT | Performed by: CLINICAL NURSE SPECIALIST

## 2021-03-11 PROCEDURE — G8417 CALC BMI ABV UP PARAM F/U: HCPCS | Performed by: CLINICAL NURSE SPECIALIST

## 2021-03-11 PROCEDURE — 59025 FETAL NON-STRESS TEST: CPT | Performed by: CLINICAL NURSE SPECIALIST

## 2021-03-11 PROCEDURE — G8427 DOCREV CUR MEDS BY ELIG CLIN: HCPCS | Performed by: CLINICAL NURSE SPECIALIST

## 2021-03-11 NOTE — PROGRESS NOTES
Kenia García is a 30 y.o. female 35w1d, complains of some round ligament pain and some neil davila contractions        OB History    Para Term  AB Living   7 4 5 0 1 5   SAB TAB Ectopic Molar Multiple Live Births   1 0 0   0 6      # Outcome Date GA Lbr Randy/2nd Weight Sex Delivery Anes PTL Lv   7 Current            6 Term 09/01/15 40w4d  8 lb 8 oz (3.856 kg) M Vag-Spont EPI  MIKE      Birth Comments: Education information given to mother and she verbalizes understanding about the following:  Hour for [employer]. Patient Safety Education. Infant security including the four band system and the HUGS system. Skin to Skin Contact for Yo   5 Term 13 39w0d  7 lb (3.175 kg) F Vag-Spont None N MIKE   4 Term 12 40w0d  7 lb 13 oz (3.544 kg) F Vag-Spont EPI N MIKE      Birth Comments: NO GDM - pitocin induction - AROM   3 Term 11 39w1d  7 lb (3.175 kg) F Vag-Spont EPI N MIKE      Birth Comments: GDM - insulin control   2 Term 09 40w3d  7 lb 8 oz (3.402 kg) M Vag-Spont EPI N MIKE      Birth Comments: GDM - diet controled   1 2007    U    DEC      Birth Comments: 1 st trimester incomplete ab followed by D&C       Blood pressure 121/69, pulse 86, temperature 98.6 °F (37 °C), temperature source Temporal, weight 245 lb 6.4 oz (111.3 kg), last menstrual period 01/10/2020, not currently breastfeeding. The patient was seen and evaluated. There was positive fetal movements. No contractions or leakage of fluid. Signs and symptoms of labor were reviewed. The S/S of Pre-Eclampsia were reviewed with the patient in detail. She is to report any of these if they occur. She currently denies any of these. The patient was instructed on fetal kick counts and was given a kick sheet to complete every 8 hours.  She was instructed that the baby should move at a minimum of ten times within one hour after a meal. The patient was instructed to lay down on her left side twenty minutes after eating and count movements for up to one hour with a target value of ten movements. She was instructed to notify the office if she did not make that target after two attempts or if after any attempt there was less than four movements. The patient reports that the targets have been made Yes. Allergies: Allergies as of 03/11/2021 - Review Complete 03/11/2021   Allergen Reaction Noted    Morphine Other (See Comments) 02/26/2013       Group Beta Strep collection was completed. No: not due    She has been instructed to call the office at anytime prior to going into the hospital so the on-call physician may direct her to the appropriate facility for care. Exceptions were reviewed including but not limited to: Decreased fetal movement, vaginal Bleeding or hemorrhage, trauma, readily expectant delivery, or any instance where she feels 911 should be utilized. Patient Active Problem List    Diagnosis Date Noted    35 weeks gestation of pregnancy 03/11/2021    Morbid obesity with BMI of 40.0-44.9, adult [facility] 03/04/2021    Encounter for supervision of high risk pregnancy in third trimester, antepartum 11/03/2020    Dyslipidemia 10/29/2020    Vitamin D insufficiency 10/29/2020    Exposure to hepatitis C 10/29/2020     Overview Note:      [facility] ED on 11/10/2017: With a chief complaint of wanting to be tested for hepatitis C. Patient states the father of her children who she is sexually active with has recently been diagnosed with hepatitis C. She is unsure if he uses IV drugs currently.       Tobacco abuse 10/28/2020    Need for vaccination 10/27/2020    Hx GDMA1 and 2 10/27/2020    Pregestational diabetes mellitus, modified White class B 10/27/2020    Varicose veins of both lower extremities with pain 10/28/2019    BMI 35.0-35.9,adult 10/28/2019    Hepatomegaly 08/15/2019    S/P hernia repair 04/25/2019     Overview Note:     Umbilical hernia repair WITH mesh 9/9394      Umbilical

## 2021-03-18 ENCOUNTER — ROUTINE PRENATAL (OUTPATIENT)
Dept: OBGYN CLINIC | Age: 36
End: 2021-03-18
Payer: COMMERCIAL

## 2021-03-18 ENCOUNTER — HOSPITAL ENCOUNTER (OUTPATIENT)
Age: 36
Setting detail: SPECIMEN
Discharge: HOME | End: 2021-03-18
Payer: COMMERCIAL

## 2021-03-18 VITALS
TEMPERATURE: 97.2 F | DIASTOLIC BLOOD PRESSURE: 77 MMHG | WEIGHT: 248.2 LBS | HEART RATE: 80 BPM | BODY MASS INDEX: 41.3 KG/M2 | SYSTOLIC BLOOD PRESSURE: 118 MMHG

## 2021-03-18 DIAGNOSIS — O09.93 ENCOUNTER FOR SUPERVISION OF HIGH RISK PREGNANCY IN THIRD TRIMESTER, ANTEPARTUM: ICD-10-CM

## 2021-03-18 DIAGNOSIS — E66.01 MORBID OBESITY WITH BMI OF 40.0-44.9, ADULT (HCC): ICD-10-CM

## 2021-03-18 DIAGNOSIS — O24.410 DIET CONTROLLED GESTATIONAL DIABETES MELLITUS (GDM) IN SECOND TRIMESTER: ICD-10-CM

## 2021-03-18 DIAGNOSIS — Z3A.34 34 WEEKS GESTATION OF PREGNANCY: ICD-10-CM

## 2021-03-18 DIAGNOSIS — Z3A.36 36 WEEKS GESTATION OF PREGNANCY: Primary | ICD-10-CM

## 2021-03-18 PROCEDURE — G8427 DOCREV CUR MEDS BY ELIG CLIN: HCPCS | Performed by: SPECIALIST

## 2021-03-18 PROCEDURE — G8482 FLU IMMUNIZE ORDER/ADMIN: HCPCS | Performed by: SPECIALIST

## 2021-03-18 PROCEDURE — 59025 FETAL NON-STRESS TEST: CPT | Performed by: SPECIALIST

## 2021-03-18 PROCEDURE — 99213 OFFICE O/P EST LOW 20 MIN: CPT | Performed by: SPECIALIST

## 2021-03-18 PROCEDURE — G8417 CALC BMI ABV UP PARAM F/U: HCPCS | Performed by: SPECIALIST

## 2021-03-18 PROCEDURE — 4004F PT TOBACCO SCREEN RCVD TLK: CPT | Performed by: SPECIALIST

## 2021-03-18 NOTE — PROGRESS NOTES
She was instructed that the baby should move at a minimum of ten times within one hour after a meal. The patient was instructed to lay down on her left side twenty minutes after eating and count movements for up to one hour with a target value of ten movements. She was instructed to notify the office if she did not make that target after two attempts or if after any attempt there was less than four movements. The patient reports that the targets have been made Yes. Allergies: Allergies as of 03/18/2021 - Review Complete 03/18/2021   Allergen Reaction Noted    Morphine Other (See Comments) 02/26/2013       Group Beta Strep collection was completed. Yes    GC and Chlamydia were previously preformed and reviewed. The results are negative. She has been instructed to call the office at anytime prior to going into the hospital so the on-call physician may direct her to the appropriate facility for care. Exceptions were reviewed including but not limited to: Decreased fetal movement, vaginal Bleeding or hemorrhage, trauma, readily expectant delivery, or any instance where she feels 911 should be utilized. Patient Active Problem List    Diagnosis Date Noted    35 weeks gestation of pregnancy 03/11/2021    Morbid obesity with BMI of 40.0-44.9, adult ([facility] [address] 03/04/2021    Encounter for supervision of high risk pregnancy in third trimester, antepartum 11/03/2020    Dyslipidemia 10/29/2020    Vitamin D insufficiency 10/29/2020    Exposure to hepatitis C 10/29/2020     Overview Note:      [facility]'s ED on 11/10/2017: With a chief complaint of wanting to be tested for hepatitis C. Patient states the father of her children who she is sexually active with has recently been diagnosed with hepatitis C. She is unsure if he uses IV drugs currently.       Tobacco abuse 10/28/2020    Need for vaccination 10/27/2020    Hx GDMA1 and 2 10/27/2020    Pregestational diabetes mellitus, modified White class B 10/27/2020  Varicose veins of both lower extremities with pain 10/28/2019    BMI 35.0-35.9,adult 10/28/2019    Hepatomegaly 08/15/2019    S/P hernia repair 04/25/2019     Overview Note:     Umbilical hernia repair WITH mesh 1/6284      Umbilical hernia 56/86/0186    Right hip pain 09/24/2012    ABC (aneurysmal bone cyst) 09/24/2012    Insulin controlled gestational diabetes mellitus (GDM) in third trimester         Diagnosis Orders   1. 36 weeks gestation of pregnancy  Group B Strep,PCR   2. Encounter for supervision of high risk pregnancy in third trimester, antepartum     3. Morbid obesity with BMI of 40.0-44.9, adult [facility])         Patient doing well. NST done today is reactive (see procedures tab for detail result). Fetal heart rate per NST baseline is 135 bpm and fundal height is 37 cm. today. Group B done today. Patient advised to continue taking prenatal vitamins and to rest as necessary. Patient is having leg cramps and is walking a lot at work. Patient's last day at work is tomorrow. The patient will return to the office for her next visit in 3 days. See antepartum flow sheet. Tre Abdi am scribing for, and in the presence of Dr. Nitin Giron. Electronically signed by: Ghulam Barajas 3/18/21 5:06 PM   I agree to the above documentation placed by my scribe Ghulam Barajas. I reviewed the scribe's note and agree with the documented findings and plan of care. Any areas of disagreement are noted on the chart. I have personally evaluated this patient. Additional findings are as noted. I agree with the chief complaint, past medical history, past surgical history, allergies, medications, social and family history as documented unless otherwise noted below.      Electronically signed by Nitin Giron MD on 3/19/2021 at 1:51 AM

## 2021-03-19 DIAGNOSIS — Z3A.36 36 WEEKS GESTATION OF PREGNANCY: ICD-10-CM

## 2021-03-20 LAB
DIRECT EXAM: NORMAL
Lab: NORMAL
SPECIMEN DESCRIPTION: NORMAL

## 2021-03-22 ENCOUNTER — ROUTINE PRENATAL (OUTPATIENT)
Dept: PERINATAL CARE | Age: 36
End: 2021-03-22
Payer: COMMERCIAL

## 2021-03-22 VITALS
WEIGHT: 247 LBS | SYSTOLIC BLOOD PRESSURE: 127 MMHG | HEART RATE: 72 BPM | TEMPERATURE: 97.3 F | DIASTOLIC BLOOD PRESSURE: 68 MMHG | BODY MASS INDEX: 41.15 KG/M2 | RESPIRATION RATE: 16 BRPM | HEIGHT: 65 IN

## 2021-03-22 DIAGNOSIS — O24.119 PRE-EXISTING TYPE 2 DIABETES MELLITUS DURING PREGNANCY, ANTEPARTUM: Primary | ICD-10-CM

## 2021-03-22 DIAGNOSIS — O99.330 TOBACCO USE DISORDER COMPLICATING PREGNANCY, CHILDBIRTH, OR PUERPERIUM, ANTEPARTUM: ICD-10-CM

## 2021-03-22 DIAGNOSIS — O99.213 OBESITY AFFECTING PREGNANCY IN THIRD TRIMESTER: ICD-10-CM

## 2021-03-22 DIAGNOSIS — Z13.89 ENCOUNTER FOR ROUTINE SCREENING FOR MALFORMATION USING ULTRASONICS: ICD-10-CM

## 2021-03-22 DIAGNOSIS — O09.43 GRAND MULTIPARITY WITH CURRENT PREGNANCY IN THIRD TRIMESTER: ICD-10-CM

## 2021-03-22 DIAGNOSIS — Z3A.36 36 WEEKS GESTATION OF PREGNANCY: ICD-10-CM

## 2021-03-22 DIAGNOSIS — Z36.4 ANTENATAL SCREENING FOR FETAL GROWTH RETARDATION USING ULTRASONICS: ICD-10-CM

## 2021-03-22 PROCEDURE — 76816 OB US FOLLOW-UP PER FETUS: CPT | Performed by: OBSTETRICS & GYNECOLOGY

## 2021-03-22 PROCEDURE — 76819 FETAL BIOPHYS PROFIL W/O NST: CPT | Performed by: OBSTETRICS & GYNECOLOGY

## 2021-03-22 NOTE — PROGRESS NOTES
Glycemic control not assessed because patient failed to bring documentation of blood glucose monitoring and/or patient not monitoring blood sugars as previously advised. Insulin regimen: Continue 6 units of subcutaneous (SQ) Novolog before lunch and dinner. Please start to send blood glucose monitoring information to [facility] office so that insulin and/or dietary changes can be made if necessary weekly. Continue recommended ADA diet therapy for diabetes. Compliance with regular prenatal care and blood sugar monitoring strongly encouraged.       Strongly advised patient to be compliant with blood sugar monitoring as previously advised to minimize the potential of adverse  outcomes (e.g. fetal demise/stillbirth, congenital birth/heart defects, polyhydramnios/oligohydramnios, fetal growth abnormalities, pregnancy loss, hypertensive disorders of pregnancy, indicated  delivery, etc.), potential maternal morbidities, etc.

## 2021-03-25 ENCOUNTER — ROUTINE PRENATAL (OUTPATIENT)
Dept: OBGYN CLINIC | Age: 36
End: 2021-03-25
Payer: COMMERCIAL

## 2021-03-25 VITALS
TEMPERATURE: 98.4 F | SYSTOLIC BLOOD PRESSURE: 108 MMHG | HEART RATE: 82 BPM | WEIGHT: 245.4 LBS | DIASTOLIC BLOOD PRESSURE: 65 MMHG | BODY MASS INDEX: 40.84 KG/M2

## 2021-03-25 DIAGNOSIS — Z3A.37 37 WEEKS GESTATION OF PREGNANCY: ICD-10-CM

## 2021-03-25 DIAGNOSIS — O09.93 ENCOUNTER FOR SUPERVISION OF HIGH RISK PREGNANCY IN THIRD TRIMESTER, ANTEPARTUM: Primary | ICD-10-CM

## 2021-03-25 DIAGNOSIS — O24.414 INSULIN CONTROLLED GESTATIONAL DIABETES MELLITUS (GDM) IN THIRD TRIMESTER: ICD-10-CM

## 2021-03-25 PROBLEM — Z3A.35 35 WEEKS GESTATION OF PREGNANCY: Status: RESOLVED | Noted: 2021-03-11 | Resolved: 2021-03-25

## 2021-03-25 LAB
ACCELERATIONS > 10BPM: NORMAL
ACCELERATIONS > 15 BPM: 4
ACOUSTIC STIMULATION: NO
DECELERATIONS: NORMAL
FHR VARIABILITIES: NORMAL
NST ASSESSMENT: REACTIVE
NST BASELINE: 140
NST DURATION: 20 MINUTES
NST INDICATIONS: NORMAL
NST LOCATIONS: NORMAL
NST READ BY: NORMAL
NST RETURN: NORMAL
UTERINE ACTIVITY: NO

## 2021-03-25 PROCEDURE — 99213 OFFICE O/P EST LOW 20 MIN: CPT | Performed by: CLINICAL NURSE SPECIALIST

## 2021-03-25 PROCEDURE — 59025 FETAL NON-STRESS TEST: CPT | Performed by: CLINICAL NURSE SPECIALIST

## 2021-03-25 PROCEDURE — G8427 DOCREV CUR MEDS BY ELIG CLIN: HCPCS | Performed by: CLINICAL NURSE SPECIALIST

## 2021-03-25 PROCEDURE — G8482 FLU IMMUNIZE ORDER/ADMIN: HCPCS | Performed by: CLINICAL NURSE SPECIALIST

## 2021-03-25 PROCEDURE — G8417 CALC BMI ABV UP PARAM F/U: HCPCS | Performed by: CLINICAL NURSE SPECIALIST

## 2021-03-25 PROCEDURE — 4004F PT TOBACCO SCREEN RCVD TLK: CPT | Performed by: CLINICAL NURSE SPECIALIST

## 2021-03-25 RX ORDER — ACETAMINOPHEN 500 MG
1000 TABLET ORAL 3 TIMES DAILY
Qty: 540 TABLET | Refills: 1 | Status: SHIPPED | OUTPATIENT
Start: 2021-03-25

## 2021-03-25 NOTE — PROGRESS NOTES
Arun Palmer is a 30 y.o. female 37w1d, patient states she has been having neil davila contractions        OB History    Para Term  AB Living   7 4 5 0 1 5   SAB TAB Ectopic Molar Multiple Live Births   1 0 0   0 6      # Outcome Date GA Lbr Randy/2nd Weight Sex Delivery Anes PTL Lv   7 Current            6 Term 09/01/15 40w4d  8 lb 8 oz (3.856 kg) M Vag-Spont EPI  MIKE      Birth Comments: Education information given to mother and she verbalizes understanding about the following:  Hour for [employer]. Patient Safety Education. Infant security including the four band system and the HUGS system. Skin to Skin Contact for Yo   5 Term 13 39w0d  7 lb (3.175 kg) F Vag-Spont None N MIKE   4 Term 12 40w0d  7 lb 13 oz (3.544 kg) F Vag-Spont EPI N MIKE      Birth Comments: NO GDM - pitocin induction - AROM   3 Term 11 39w1d  7 lb (3.175 kg) F Vag-Spont EPI N MKIE      Birth Comments: GDM - insulin control   2 Term 09 40w3d  7 lb 8 oz (3.402 kg) M Vag-Spont EPI N MIKE      Birth Comments: GDM - diet controled   1 2007    U    DEC      Birth Comments: 1 st trimester incomplete ab followed by D&C       Blood pressure 108/65, pulse 82, temperature 98.4 °F (36.9 °C), temperature source Temporal, weight 245 lb 6.4 oz (111.3 kg), last menstrual period 01/10/2020, not currently breastfeeding. The patient was seen and evaluated. There was positive fetal movements. No contractions or leakage of fluid. Signs and symptoms of labor were reviewed. The S/S of Pre-Eclampsia were reviewed with the patient in detail. She is to report any of these if they occur. She currently denies any of these. The patient was instructed on fetal kick counts and was given a kick sheet to complete every 8 hours.  She was instructed that the baby should move at a minimum of ten times within one hour after a meal. The patient was instructed to lay down on her left side twenty minutes after eating and count movements for up to one hour with a target value of ten movements. She was instructed to notify the office if she did not make that target after two attempts or if after any attempt there was less than four movements. The patient reports that the targets have been made Yes. Allergies: Allergies as of 03/25/2021 - Review Complete 03/25/2021   Allergen Reaction Noted    Morphine Other (See Comments) 02/26/2013       Group Beta Strep collection was completed. Yes    She has been instructed to call the office at anytime prior to going into the hospital so the on-call physician may direct her to the appropriate facility for care. Exceptions were reviewed including but not limited to: Decreased fetal movement, vaginal Bleeding or hemorrhage, trauma, readily expectant delivery, or any instance where she feels 911 should be utilized. Patient Active Problem List    Diagnosis Date Noted    Morbid obesity with BMI of 40.0-44.9, adult ([facility] [address] 03/04/2021    Encounter for supervision of high risk pregnancy in third trimester, antepartum 11/03/2020    Dyslipidemia 10/29/2020    Vitamin D insufficiency 10/29/2020    Exposure to hepatitis C 10/29/2020     Overview Note:      [facility]'s ED on 11/10/2017: With a chief complaint of wanting to be tested for hepatitis C. Patient states the father of her children who she is sexually active with has recently been diagnosed with hepatitis C. She is unsure if he uses IV drugs currently.       Tobacco abuse 10/28/2020    Need for vaccination 10/27/2020    Hx GDMA1 and 2 10/27/2020    Pregestational diabetes mellitus, modified White class B 10/27/2020    Varicose veins of both lower extremities with pain 10/28/2019    BMI 35.0-35.9,adult 10/28/2019    Hepatomegaly 08/15/2019    S/P hernia repair 04/25/2019     Overview Note:     Umbilical hernia repair WITH mesh 1/4597      Umbilical hernia 77/24/3515    Right hip pain 09/24/2012    ABC (aneurysmal bone cyst) 2012    Insulin controlled gestational diabetes mellitus (GDM) in third trimester         Diagnosis Orders   1. Encounter for supervision of high risk pregnancy in third trimester, antepartum     2. Insulin controlled gestational diabetes mellitus (GDM) in third trimester     3. 37 weeks gestation of pregnancy       Continue prenatal vitamins, increase water intake and frequent rest periods as needed. Fetal kick counts reviewed. Reviewed  labor with patient and patient verbalized understanding. The patient will return to the office for her next visit in 1 weeks. See antepartum flow sheet.      Electronically signed by: Dandre Anthony CNP

## 2021-03-30 ENCOUNTER — ANESTHESIA EVENT (OUTPATIENT)
Dept: LABOR AND DELIVERY | Age: 36
DRG: 560 | End: 2021-03-30
Payer: COMMERCIAL

## 2021-03-30 ENCOUNTER — TELEPHONE (OUTPATIENT)
Dept: OBGYN CLINIC | Age: 36
End: 2021-03-30

## 2021-03-30 ENCOUNTER — ANESTHESIA (OUTPATIENT)
Dept: LABOR AND DELIVERY | Age: 36
DRG: 560 | End: 2021-03-30
Payer: COMMERCIAL

## 2021-03-30 ENCOUNTER — HOSPITAL ENCOUNTER (INPATIENT)
Age: 36
LOS: 1 days | Discharge: HOME | DRG: 560 | End: 2021-04-01
Attending: SPECIALIST | Admitting: SPECIALIST
Payer: COMMERCIAL

## 2021-03-30 PROBLEM — Z91.199 NONCOMPLIANCE: Status: ACTIVE | Noted: 2021-03-30

## 2021-03-30 PROBLEM — K76.0 FATTY LIVER DISEASE, NONALCOHOLIC: Status: ACTIVE | Noted: 2021-03-30

## 2021-03-30 PROBLEM — F32.A DEPRESSION: Status: ACTIVE | Noted: 2021-03-30

## 2021-03-30 PROBLEM — Z3A.37 37 WEEKS GESTATION OF PREGNANCY: Status: ACTIVE | Noted: 2021-03-30

## 2021-03-30 PROBLEM — O03.9 SAB (SPONTANEOUS ABORTION): Status: ACTIVE | Noted: 2021-03-30

## 2021-03-30 PROBLEM — Z64.1 GRAND MULTIPARA: Status: ACTIVE | Noted: 2021-03-30

## 2021-03-30 PROBLEM — E55.9 VITAMIN D INSUFFICIENCY: Status: RESOLVED | Noted: 2020-10-29 | Resolved: 2021-03-30

## 2021-03-30 LAB
ABO/RH: NORMAL
ABSOLUTE EOS #: 0.2 K/UL (ref 0–0.4)
ABSOLUTE IMMATURE GRANULOCYTE: ABNORMAL K/UL (ref 0–0.3)
ABSOLUTE LYMPH #: 2.2 K/UL (ref 1–4.8)
ABSOLUTE MONO #: 0.7 K/UL (ref 0.1–1.3)
AMPHETAMINE SCREEN URINE: NEGATIVE
ANTIBODY SCREEN: NEGATIVE
ARM BAND NUMBER: NORMAL
BARBITURATE SCREEN URINE: NEGATIVE
BASOPHILS # BLD: 0 % (ref 0–2)
BASOPHILS ABSOLUTE: 0 K/UL (ref 0–0.2)
BENZODIAZEPINE SCREEN, URINE: NEGATIVE
BILIRUBIN URINE: NEGATIVE
BUPRENORPHINE URINE: NORMAL
CANNABINOID SCREEN URINE: NEGATIVE
COCAINE METABOLITE, URINE: NEGATIVE
COLOR: YELLOW
COMMENT UA: NORMAL
DIFFERENTIAL TYPE: ABNORMAL
EOSINOPHILS RELATIVE PERCENT: 2 % (ref 0–4)
EXPIRATION DATE: NORMAL
GLUCOSE BLD-MCNC: 100 MG/DL (ref 65–105)
GLUCOSE URINE: NEGATIVE
HCT VFR BLD CALC: 36 % (ref 36–46)
HEMOGLOBIN: 12.3 G/DL (ref 12–16)
IMMATURE GRANULOCYTES: ABNORMAL %
KETONES, URINE: NEGATIVE
LEUKOCYTE ESTERASE, URINE: NEGATIVE
LYMPHOCYTES # BLD: 19 % (ref 24–44)
MCH RBC QN AUTO: 32.7 PG (ref 26–34)
MCHC RBC AUTO-ENTMCNC: 34.1 G/DL (ref 31–37)
MCV RBC AUTO: 96 FL (ref 80–100)
MDMA URINE: NORMAL
METHADONE SCREEN, URINE: NEGATIVE
METHAMPHETAMINE, URINE: NORMAL
MONOCYTES # BLD: 6 % (ref 1–7)
NITRITE, URINE: NEGATIVE
NRBC AUTOMATED: ABNORMAL PER 100 WBC
OPIATES, URINE: NEGATIVE
OXYCODONE SCREEN URINE: NEGATIVE
PDW BLD-RTO: 13.3 % (ref 11.5–14.9)
PH UA: 6.5 (ref 5–8)
PHENCYCLIDINE, URINE: NEGATIVE
PLATELET # BLD: 249 K/UL (ref 150–450)
PLATELET ESTIMATE: ABNORMAL
PMV BLD AUTO: 7.9 FL (ref 6–12)
PROPOXYPHENE, URINE: NORMAL
PROTEIN UA: NEGATIVE
RBC # BLD: 3.75 M/UL (ref 4–5.2)
RBC # BLD: ABNORMAL 10*6/UL
SARS-COV-2, RAPID: NOT DETECTED
SEG NEUTROPHILS: 73 % (ref 36–66)
SEGMENTED NEUTROPHILS ABSOLUTE COUNT: 8.4 K/UL (ref 1.3–9.1)
SPECIFIC GRAVITY UA: 1.02 (ref 1–1.03)
SPECIMEN DESCRIPTION: NORMAL
T. PALLIDUM, IGG: NONREACTIVE
TEST INFORMATION: NORMAL
TRICYCLIC ANTIDEPRESSANTS, UR: NORMAL
TURBIDITY: CLEAR
URINE HGB: NEGATIVE
UROBILINOGEN, URINE: NORMAL
WBC # BLD: 11.6 K/UL (ref 3.5–11)
WBC # BLD: ABNORMAL 10*3/UL

## 2021-03-30 PROCEDURE — 82947 ASSAY GLUCOSE BLOOD QUANT: CPT

## 2021-03-30 PROCEDURE — 36415 COLL VENOUS BLD VENIPUNCTURE: CPT

## 2021-03-30 PROCEDURE — 2500000003 HC RX 250 WO HCPCS: Performed by: ANESTHESIOLOGY

## 2021-03-30 PROCEDURE — 6360000002 HC RX W HCPCS: Performed by: STUDENT IN AN ORGANIZED HEALTH CARE EDUCATION/TRAINING PROGRAM

## 2021-03-30 PROCEDURE — 3E033VJ INTRODUCTION OF OTHER HORMONE INTO PERIPHERAL VEIN, PERCUTANEOUS APPROACH: ICD-10-PCS | Performed by: SPECIALIST

## 2021-03-30 PROCEDURE — 85025 COMPLETE CBC W/AUTO DIFF WBC: CPT

## 2021-03-30 PROCEDURE — 81003 URINALYSIS AUTO W/O SCOPE: CPT

## 2021-03-30 PROCEDURE — 6370000000 HC RX 637 (ALT 250 FOR IP): Performed by: STUDENT IN AN ORGANIZED HEALTH CARE EDUCATION/TRAINING PROGRAM

## 2021-03-30 PROCEDURE — 76815 OB US LIMITED FETUS(S): CPT

## 2021-03-30 PROCEDURE — 86780 TREPONEMA PALLIDUM: CPT

## 2021-03-30 PROCEDURE — G0378 HOSPITAL OBSERVATION PER HR: HCPCS

## 2021-03-30 PROCEDURE — 2580000003 HC RX 258: Performed by: STUDENT IN AN ORGANIZED HEALTH CARE EDUCATION/TRAINING PROGRAM

## 2021-03-30 PROCEDURE — 87635 SARS-COV-2 COVID-19 AMP PRB: CPT

## 2021-03-30 PROCEDURE — 86850 RBC ANTIBODY SCREEN: CPT

## 2021-03-30 PROCEDURE — 80307 DRUG TEST PRSMV CHEM ANLYZR: CPT

## 2021-03-30 PROCEDURE — 86900 BLOOD TYPING SEROLOGIC ABO: CPT

## 2021-03-30 PROCEDURE — 3700000025 EPIDURAL BLOCK: Performed by: ANESTHESIOLOGY

## 2021-03-30 PROCEDURE — 86803 HEPATITIS C AB TEST: CPT

## 2021-03-30 PROCEDURE — 86901 BLOOD TYPING SEROLOGIC RH(D): CPT

## 2021-03-30 RX ORDER — LIDOCAINE HYDROCHLORIDE AND EPINEPHRINE 15; 5 MG/ML; UG/ML
INJECTION, SOLUTION EPIDURAL PRN
Status: DISCONTINUED | OUTPATIENT
Start: 2021-03-30 | End: 2021-03-31

## 2021-03-30 RX ORDER — SODIUM CHLORIDE 9 MG/ML
INJECTION, SOLUTION INTRAVENOUS CONTINUOUS
Status: DISCONTINUED | OUTPATIENT
Start: 2021-03-30 | End: 2021-04-01 | Stop reason: HOSPADM

## 2021-03-30 RX ORDER — SODIUM CHLORIDE 0.9 % (FLUSH) 0.9 %
10 SYRINGE (ML) INJECTION EVERY 12 HOURS SCHEDULED
Status: DISCONTINUED | OUTPATIENT
Start: 2021-03-30 | End: 2021-03-31

## 2021-03-30 RX ORDER — SODIUM CHLORIDE 0.9 % (FLUSH) 0.9 %
10 SYRINGE (ML) INJECTION PRN
Status: DISCONTINUED | OUTPATIENT
Start: 2021-03-30 | End: 2021-03-31

## 2021-03-30 RX ORDER — NALOXONE HYDROCHLORIDE 0.4 MG/ML
0.4 INJECTION, SOLUTION INTRAMUSCULAR; INTRAVENOUS; SUBCUTANEOUS PRN
Status: DISCONTINUED | OUTPATIENT
Start: 2021-03-30 | End: 2021-04-01 | Stop reason: HOSPADM

## 2021-03-30 RX ORDER — ONDANSETRON 2 MG/ML
4 INJECTION INTRAMUSCULAR; INTRAVENOUS EVERY 6 HOURS PRN
Status: DISCONTINUED | OUTPATIENT
Start: 2021-03-30 | End: 2021-03-31

## 2021-03-30 RX ORDER — LIDOCAINE HYDROCHLORIDE 10 MG/ML
30 INJECTION, SOLUTION EPIDURAL; INFILTRATION; INTRACAUDAL; PERINEURAL PRN
Status: DISCONTINUED | OUTPATIENT
Start: 2021-03-30 | End: 2021-03-31

## 2021-03-30 RX ORDER — ACETAMINOPHEN 500 MG
1000 TABLET ORAL EVERY 6 HOURS PRN
Status: DISCONTINUED | OUTPATIENT
Start: 2021-03-30 | End: 2021-03-31

## 2021-03-30 RX ORDER — NALBUPHINE HCL 10 MG/ML
5 AMPUL (ML) INJECTION
Status: DISCONTINUED | OUTPATIENT
Start: 2021-03-30 | End: 2021-04-01 | Stop reason: HOSPADM

## 2021-03-30 RX ORDER — SODIUM CHLORIDE 9 MG/ML
25 INJECTION, SOLUTION INTRAVENOUS PRN
Status: DISCONTINUED | OUTPATIENT
Start: 2021-03-30 | End: 2021-03-31

## 2021-03-30 RX ORDER — DIPHENHYDRAMINE HCL 25 MG
25 TABLET ORAL EVERY 4 HOURS PRN
Status: DISCONTINUED | OUTPATIENT
Start: 2021-03-30 | End: 2021-03-31

## 2021-03-30 RX ADMIN — SODIUM CHLORIDE: 9 INJECTION, SOLUTION INTRAVENOUS at 21:08

## 2021-03-30 RX ADMIN — SODIUM CHLORIDE: 9 INJECTION, SOLUTION INTRAVENOUS at 18:28

## 2021-03-30 RX ADMIN — INSULIN LISPRO 6 UNITS: 100 INJECTION, SOLUTION INTRAVENOUS; SUBCUTANEOUS at 18:42

## 2021-03-30 RX ADMIN — Medication 1 MILLI-UNITS/MIN: at 19:57

## 2021-03-30 RX ADMIN — LIDOCAINE HYDROCHLORIDE,EPINEPHRINE BITARTRATE 4 ML: 15; .005 INJECTION, SOLUTION EPIDURAL; INFILTRATION; INTRACAUDAL; PERINEURAL at 21:36

## 2021-03-30 RX ADMIN — Medication 7 ML/HR: at 21:45

## 2021-03-30 RX ADMIN — Medication 7 ML/HR: at 21:44

## 2021-03-30 ASSESSMENT — LIFESTYLE VARIABLES: SMOKING_STATUS: 1

## 2021-03-30 NOTE — DISCHARGE SUMMARY
Obstetric Discharge Summary  [facility]    Patient Name: David Leon  Patient : 1985  Primary Care Physician: JUSTIN Pete - CNP  Admit Date: 3/30/2021    Principal Diagnosis: IUP at 37w6d, admitted for PROM     Her pregnancy has been complicated by:   Patient Active Problem List   Diagnosis    Insulin controlled gestational diabetes mellitus (GDM) in third trimester    Right hip pain    ABC (aneurysmal bone cyst)    Umbilical hernia    S/P hernia repair    Hepatomegaly    Varicose veins of both lower extremities with pain    Need for vaccination    Hx GDMA1 and 2    Pregestational diabetes mellitus, modified White class B    Tobacco abuse    Dyslipidemia    Exposure to hepatitis C    Morbid obesity with BMI of 40.0-44.9, adult [facility]    Fatty liver disease, nonalcoholic    Depression    SAB x1    Grand multipara    Noncompliance     3/31/21 F Apg 8/9 Wt 6#14    PreE w/o SF's (G7)       Infection Present?: No  Hospital Acquired: No    Surgical Operations & Procedures:  [x] Pitocin Induction of Labor  [] Pitocin Augmentation of Labor  [] Prostaglandin Induction of Labor  [] Mechanical Induction of Labor  [] Artificial Rupture of Membranes  [] Intrauterine Pressure Catheter  [] Fetal Scalp Electrode  [] Amnioinfusion  Analgesia: epidural  Delivery Type: Spontaneous Vaginal Delivery: See Labor and Delivery Summary   Laceration(s): Absent    Consultations: Anesthesia    Pertinent Findings & Procedures:   David Leon is a 30 y.o. female [id number] at 41w10d admitted for PROM (clear fluid) @ 1300 3/30; received Pitocin, epidural. She delivered by spontaneous vaginal a Live Born infant on 3/31/21. Patient had elevated BP's during the labor course.  PreE labs wnl, P/C 1.39, patient met criteria for PreE w/o SF's    Information for the patient's :  Maureen Akbar [[id number]]   female   Birth Weight: 6 lb 14.1 oz (3.12 kg)       Apgars: 8 at 1 minute and 9 at 5 minutes. Postpartum course: normal.    PPD#1: Fasting glucose 84. Insulin held. Course of patient: complicated by newly diagnosed PreE w/o SF's    Discharge to: Home    Readmission planned: no     Recommendations on Discharge:     Medications:      Medication List      START taking these medications    docusate sodium 100 MG capsule  Commonly known as: Colace  Take 1 capsule by mouth 2 times daily as needed for Constipation     ibuprofen 600 MG tablet  Commonly known as: ADVIL;MOTRIN  Take 1 tablet by mouth every 6 hours as needed for Pain        CONTINUE taking these medications    acetaminophen 500 MG tablet  Commonly known as: TYLENOL  Take 2 tablets by mouth 3 times daily     aspirin 81 MG EC tablet     blood glucose monitor kit and supplies  Dispense sufficient amount for indicated testing frequency plus additional to accommodate PRN testing needs. Dispense all needed supplies to include: monitor, strips, lancing device, lancets, control solutions, alcohol swabs. blood glucose test strips  Test 4 times a day & as needed for symptoms of irregular blood glucose. Dispense sufficient amount for indicated testing frequency plus additional to accommodate PRN testing needs.      Insulin Aspart FlexPen 100 UNIT/ML Sopn     Lancets Misc  1 each by Does not apply route 4 times daily     RA Alcohol Swabs 70 % Pads     Zatean-Pn DHA 27-0.6-0.4-300 MG Caps           Where to Get Your Medications      You can get these medications from any pharmacy    Bring a paper prescription for each of these medications  · docusate sodium 100 MG capsule  · ibuprofen 600 MG tablet         Activity: pelvic rest x 6 weeks, no lifting greater than 15 lbs  Diet: diabetic diet  Follow up: 1 week for PP visit and BP check, patient will need 2-hr GTT at 6 week PP visit    Condition on discharge: stable    Discharge date: 4/1/21    Victoriano Persaud DO  Ob/Gyn Resident    Comments:  Home care and follow-up care were reviewed. Pelvic rest, and birth control were reviewed. Signs and symptoms of mastitis and post partum depression were reviewed. The patient is to notify her physician if any of these occur. The patient was counseled on secondary smoke risks and the increased risk of sudden infant death syndrome and respiratory problems to her baby with exposure. She was counseled on various alternate recommendations to decrease the exposure to secondary smoke to her children.

## 2021-03-30 NOTE — TELEPHONE ENCOUNTER
Pt called with c/o fluid leaking from vagina  Pt advised to go to [facility] for evaluation

## 2021-03-30 NOTE — H&P
OBSTETRICAL HISTORY [address]    Date: 3/30/2021       Time: 8:24 PM   Patient Name: Willa Mg     Patient : 1985  Room/Bed: 0180/0180-01    Admission Date/Time: 3/30/2021  4:11 PM      CC: Leaking fluid      HPI: Willa Mg is a 30 y.o. [id number] at 37w6d who presents complaining of leakage of fluid since 1pm this afternoon. She states she got out of the shower and continued to soak through her underwear so she presented to the hospital.     The patient reports fetal movement is present, denies contractions, complains of loss of fluid, denies vaginal bleeding. Pregnancy is complicated by Pregestational DM, Hx of elevated BP x1, SAB x1, Umbilical hernia S/P hernia repair, Exposure to hepatitis C, Hx GDMA1 and 2, Tobacco abuse, Grand Multip, Noncompliance, BMI 40.8    DATING:  LMP: Patient's last menstrual period was 01/10/2020.   Estimated Date of Delivery: 21   Based on: early ultrasound, at 6 0/7 weeks GA    PREGNANCY RISK FACTORS:  Patient Active Problem List   Diagnosis    Insulin controlled gestational diabetes mellitus (GDM) in third trimester    Right hip pain    ABC (aneurysmal bone cyst)    Umbilical hernia    S/P hernia repair    Hepatomegaly    Varicose veins of both lower extremities with pain    BMI 35.0-35.9,adult    Need for vaccination    Hx GDMA1 and 2    Pregestational diabetes mellitus, modified White class B    Tobacco abuse    Dyslipidemia    Exposure to hepatitis C    Encounter for supervision of high risk pregnancy in third trimester, antepartum    Morbid obesity with BMI of 40.0-44.9, adult ([facility] [address]    37 weeks gestation of pregnancy    Fatty liver disease, nonalcoholic    Depression    SAB x1    Grand multipara    Noncompliance        Steroids Given In This Pregnancy:  no     REVIEW OF SYSTEMS:   Constitutional: negative fever, negative chills, negative weight changes   HEENT: negative visual disturbances, negative headaches, negative dizziness, negative hearing loss  Breast: Negative breast abnormalities, negative breast lumps, negative nipple discharge  Respiratory: negative dyspnea, negative cough, negative SOB  Cardiovascular: negative chest pain,  negative palpitations, negative arrhythmia, negative syncope   Gastrointestinal: negative abdominal pain, negative RUQ pain, negative N/V, negative diarrhea, negative constipation, negative bowel changes, negative heartburn   Genitourinary: negative dysuria, negative hematuria, negative urinary incontinence, negative vaginal discharge, negative vaginal bleeding or spotting, positive leaking fluid  Dermatological: negative rash, negative pruritis, negative mole or other skin changes  Hematologic: negative bruising  Immunologic/Lymphatic: negative recent illness, negative recent sick contact  Musculoskeletal: negative back pain, negative myalgias, negative arthralgias  Neurological:  negative dizziness, negative migraines, negative seizures, negative weakness  Behavior/Psych: negative depression, negative anxiety, negative SI, negative HI    OBSTETRICAL HISTORY:   OB History    Para Term  AB Living   7 4 5 0 1 5   SAB TAB Ectopic Molar Multiple Live Births   1 0 0 0 0 6      # Outcome Date GA Lbr Randy/2nd Weight Sex Delivery Anes PTL Lv   7 Current            6 Term 09/01/15 40w4d  8 lb 8 oz (3.856 kg) M Vag-Spont EPI  MIKE      Birth Comments: Education information given to mother and she verbalizes understanding about the following:  Hour for [employer]. Patient Safety Education. Infant security including the four band system and the HUGS system.   Skin to Skin Contact for Yo      Name: Tyler Pae: 8  Apgar5: 9   5 Term 13 39w0d  7 lb (3.175 kg) F Vag-Spont None N MIKE   4 Term 12 40w0d  7 lb 13 oz (3.544 kg) F Vag-Spont EPI N MIKE      Birth Comments: NO GDM - pitocin induction - AROM      Name: Mauro Reynolds   3 Term 03/28/11 39w1d  7 lb (3.175 kg) F Vag-Spont EPI N MIKE      Birth Comments: GDM - insulin control      Name: Colton Elliott   2 Term 04/30/09 40w3d  7 lb 8 oz (3.402 kg) M Vag-Spont EPI N MIKE      Birth Comments: GDM - diet controled      Name: Sherrell Milton   1 SAB 2007    U    DEC      Birth Comments: 1 st trimester incomplete ab followed by [facility]        PAST MEDICAL HISTORY:   has a past medical history of Abnormal Pap smear, Blood type A+, Bone disease, Depression, Gestational diabetes, Liver disease, Menarche, Parity, Postpartum depression, and Venous insufficiency of right leg. PAST SURGICAL HISTORY:   has a past surgical history that includes Leg Surgery (Right, 10/2012); Dilation and curettage of uterus; Umbilical hernia repair (N/A, 4/25/2019); and Dilation & curettage. ALLERGIES:  is allergic to morphine. MEDICATIONS:  Prior to Admission medications    Medication Sig Start Date End Date Taking? Authorizing Provider   acetaminophen (TYLENOL) 500 MG tablet Take 2 tablets by mouth 3 times daily 3/25/21   JUSTIN Leon CNP   Insulin Aspart FlexPen 100 UNIT/ML SOPN Inject 6 Units into the skin 2 times daily Before lunch and dinner. 2/15/21   Historical Provider, MD Mata w/o P-OX-Dxeeyjh-FA-DHA (FABY DHA) 27-0.6-0.4-300 MG CAPS  12/6/20   Historical Provider, MD   aspirin 81 MG EC tablet Take 81 mg by mouth daily    Historical Provider, MD MCCARTY Alcohol Swabs 70 % PADS USE AS DIRECTED four times a day 10/19/20   Historical Provider, MD   blood glucose monitor kit and supplies Dispense sufficient amount for indicated testing frequency plus additional to accommodate PRN testing needs. Dispense all needed supplies to include: monitor, strips, lancing device, lancets, control solutions, alcohol swabs. 10/16/20   JUSTIN Leon CNP   blood glucose monitor strips Test 4 times a day & as needed for symptoms of irregular blood glucose.  Dispense sufficient amount for indicated testing frequency plus additional to accommodate PRN testing needs. 10/16/20   JUSTIN Conrad CNP   Lancets MISC 1 each by Does not apply route 4 times daily 10/16/20   JUSTIN Conrad CNP       FAMILY HISTORY:  family history includes Cancer in her maternal grandfather; Depression in her mother; Diabetes in her mother; Early Death in her maternal grandfather and maternal grandmother; Heart Disease in her mother; High Blood Pressure in her maternal grandfather, maternal grandmother, and mother; Kidney Disease in her mother; Liver Disease in her maternal grandmother and mother; Stroke in her mother; Thyroid Disease in her mother. SOCIAL HISTORY:   reports that she has been smoking cigarettes. She has a 4.25 pack-year smoking history. She has never used smokeless tobacco. She reports previous alcohol use. She reports previous drug use. Drug: Marijuana.     VITALS:  Vitals:    03/30/21 1851 03/30/21 1856 03/30/21 1904 03/30/21 2001   BP:   (!) 106/55 (!) 117/55   Pulse:   78 78   Resp:   16 16   Temp:   97.3 °F (36.3 °C) 97.5 °F (36.4 °C)   TempSrc:   Infrared Infrared   SpO2: 97% 98% 98% 97%   Weight:       Height:             PHYSICAL EXAM:  Fetal Heart Monitor:  Baseline Heart Rate 155, moderate variability, present accelerations, absent decelerations  [place]: contractions, none    General appearance:  no apparent distress, alert and cooperative  HEENT: head atraumatic, normocephalic, moist mucous membranes, trachea midline  Neurologic:  alert, oriented, normal speech, no focal findings or movement disorder noted  Lungs:  No increased work of breathing, good air exchange, clear to auscultation bilaterally, no crackles or wheezing  Heart:  regular rate and rhythm and no murmur, rubs, gallops  Abdomen:  soft, gravid, non-tender, no rebound, guarding, or rigidity, no RUQ or epigastric tenderness, no signs or symptoms of abruption, no signs or symptoms of chorioamnionitis  Extremities:  no calf tenderness, non edematous, no varicosities, full range of motion in all four extremities  Musculoskeletal: Gross strength equal and intact throughout, no gross abnormalities, range of motion normal in hips, knees, shoulders and spine  Psychiatric: Mood appropriate, normal affect   Rectal Exam: not indicated  Pelvic Exam: performed by RN  Sterile Vaginal Exam: performed by RN  Cervix: No cervical motion tenderness   Uterus: Is gravid, Normal size, shape, consistency and non-tender   Adnexa: Non-tender, no palpable masses  Cervix: 2 cm dilated, 60 % effaced, -1 station, mid position (out of 3 station), soft consistency, FETAL POSITION: Cephalic (confirmed by ultrasound), Membranes ruptured, clear fluid,    Bishops Score: 8     0 1 2 3   Position Posterior Intermediate Anterior -   Consistency Firm Intermediate Soft -   Effacement 0-30% 31-50% 51-80% >80%   Dilation 0cm 1-2cm 3-4cm >5cm   Fetal Station -3 -2 -1, 0 +1, +2     DATA:  Membranes Ruptured: Yes  Nitrazine: Positive - performed by RN    LIMITED BEDSIDE US:  Position: Cephalic  Placental Location: anterior  Fetal Heart Tones: Present  Fetal Movement: Present  Amniotic Fluid Index/Volume: adequate 2x2 cm fluid pocket  Estimated Fetal Weight:  7 lbs 5oz    PRENATAL LAB RESULTS:   Blood Type/Rh: A pos  Antibody Screen: negative  Hemoglobin, Hematocrit, Platelets: 13 / 92.2 / 237  Rubella: immune  T.  Pallidum, IgG: non-reactive   Hepatitis B Surface Antigen: non-reactive   HIV: non-reactive   Sickle Cell Screen: negative  Gonorrhea: negative  Chlamydia: negative  Urine culture: negative, date: 10/12/20    Early 1 hour Glucose Tolerance Test: 178  Early 3 hour Glucose Tolerance Test: Fastin; 1 hour: 200; 2 hour  197; 3 hour: 103    Group B Strep: negative  Cystic Fibrosis Screen: negative  First Trimester Screen: Not done   MSAFP/Multiple Markers: normal  Non-Invasive Prenatal Testing: no aneuploidy detected  Anatomy US: Normal fetal anatomy, anterior placenta, 3VC, normal insertion    ASSESSMENT & PLAN:  Sathya Bocanegra is a 30 y.o. female [id number] at 37w6d    - GBS negative / Rh positive / R immune   - No indication for GBS prophylaxis   - VSS, Afebrile    Leaking Fluid     - VSS, Afebrile              - cEFM/TOCO: Cat 1              - SVE:  2/60/-1              - Nitrazine positive per RN check   - Admit to labor and delivery under the service of Dr. Osorio Rich   - VSS   - cEFM and TOCO   - Cat 1 FHT and TOCO showing no contractions   - CBC, T&S, T.Pal, COVID ordered   - UDS ordered. R/B/A discussed with patient and patient agreeable   - IVF: NS @ 125mL/hr   - Plan of Induction: Pitocin   - Continue expectant management     Pregestational DM    - She states her FBS are 90s and 2hr PP are 120s-130s   - Fetal ECHO WNL    - Continue ASA 81 mg    - She is on Aspart 7/5/8       Umbilical hernia S/P hernia repair w/ Mesh 4/2019    Exposure to hepatitis C    - Patient presented to [facility] ED 11/2017 with wanting to be tested to Hep C because her the father of her children with whom she is sexually active with has been diagnosed with Hep C   - Hep C antibody ordered    Fatty Liver disease   - The patient is being managed by [facility]   - Hepatomegaly Noted on CT 7/2019. LFTs WNL at that time     Hx of Depression   - Stable on no meds   - Denies SI/HI    - SW consult PP    Hx GDMA1 and 2     Tobacco abuse    - Cessation encouraged.  She currently uses half a pack a day     Hx of SAB x1   - Required D&C    Grand Multip    Noncompliance       BMI 40.8       Patient Active Problem List    Diagnosis Date Noted    37 weeks gestation of pregnancy 03/30/2021    Fatty liver disease, nonalcoholic 00/66/4164    Depression 03/30/2021    SAB x1 03/30/2021     Required D&C      Grand multipara 03/30/2021    Noncompliance 03/30/2021    Morbid obesity with BMI of 40.0-44.9, adult ([facility] [address] 03/04/2021    Encounter for supervision of high risk pregnancy in third trimester, antepartum 11/03/2020    Dyslipidemia 10/29/2020    Exposure to hepatitis C 10/29/2020      [facility] ED on 11/10/2017: With a chief complaint of wanting to be tested for hepatitis C. Patient states the father of her children who she is sexually active with has recently been diagnosed with hepatitis C. She is unsure if he uses IV drugs currently.  Tobacco abuse 10/28/2020    Need for vaccination 10/27/2020    Hx GDMA1 and 2 10/27/2020    Pregestational diabetes mellitus, modified White class B 10/27/2020    Varicose veins of both lower extremities with pain 10/28/2019    BMI 35.0-35.9,adult 10/28/2019    Hepatomegaly 08/15/2019    S/P hernia repair 73/26/5115     Umbilical hernia repair WITH mesh 4/7858      Umbilical hernia 95/62/1615    Right hip pain 09/24/2012    ABC (aneurysmal bone cyst) 09/24/2012    Insulin controlled gestational diabetes mellitus (GDM) in third trimester        Plan discussed with Dr. Marin Morton, who is agreeable. Steroids given this admission: No    Risks, benefits, alternatives and possible complications have been discussed in detail with the patient. Admission, and post admission procedures and expectations were discussed in detail. All questions were answered.     Attending's Name: Dr. Cici Abebe DO  Ob/Gyn Resident  3/30/2021, 8:24 PM

## 2021-03-31 PROBLEM — O09.90 HRP (HIGH RISK PREGNANCY): Status: ACTIVE | Noted: 2021-03-31

## 2021-03-31 PROBLEM — O09.93 ENCOUNTER FOR SUPERVISION OF HIGH RISK PREGNANCY IN THIRD TRIMESTER, ANTEPARTUM: Status: RESOLVED | Noted: 2020-11-03 | Resolved: 2021-03-31

## 2021-03-31 PROBLEM — Z3A.37 37 WEEKS GESTATION OF PREGNANCY: Status: RESOLVED | Noted: 2021-03-30 | Resolved: 2021-03-31

## 2021-03-31 PROBLEM — O09.90 HRP (HIGH RISK PREGNANCY): Status: RESOLVED | Noted: 2021-03-31 | Resolved: 2021-03-31

## 2021-03-31 LAB
ABSOLUTE EOS #: 0.1 K/UL (ref 0–0.4)
ABSOLUTE IMMATURE GRANULOCYTE: ABNORMAL K/UL (ref 0–0.3)
ABSOLUTE LYMPH #: 1.9 K/UL (ref 1–4.8)
ABSOLUTE MONO #: 0.7 K/UL (ref 0.1–1.3)
ALBUMIN SERPL-MCNC: 2.9 G/DL (ref 3.5–5.2)
ALBUMIN/GLOBULIN RATIO: ABNORMAL (ref 1–2.5)
ALP BLD-CCNC: 129 U/L (ref 35–104)
ALT SERPL-CCNC: 13 U/L (ref 5–33)
ANION GAP SERPL CALCULATED.3IONS-SCNC: 9 MMOL/L (ref 9–17)
AST SERPL-CCNC: 25 U/L
BASOPHILS # BLD: 0 % (ref 0–2)
BASOPHILS ABSOLUTE: 0 K/UL (ref 0–0.2)
BILIRUB SERPL-MCNC: 0.18 MG/DL (ref 0.3–1.2)
BUN BLDV-MCNC: 4 MG/DL (ref 6–20)
BUN/CREAT BLD: ABNORMAL (ref 9–20)
CALCIUM SERPL-MCNC: 8.4 MG/DL (ref 8.6–10.4)
CHLORIDE BLD-SCNC: 103 MMOL/L (ref 98–107)
CO2: 21 MMOL/L (ref 20–31)
CREAT SERPL-MCNC: <0.4 MG/DL (ref 0.5–0.9)
CREATININE URINE: 90.8 MG/DL (ref 28–217)
DIFFERENTIAL TYPE: ABNORMAL
EOSINOPHILS RELATIVE PERCENT: 1 % (ref 0–4)
GFR AFRICAN AMERICAN: ABNORMAL ML/MIN
GFR NON-AFRICAN AMERICAN: ABNORMAL ML/MIN
GFR SERPL CREATININE-BSD FRML MDRD: ABNORMAL ML/MIN/{1.73_M2}
GFR SERPL CREATININE-BSD FRML MDRD: ABNORMAL ML/MIN/{1.73_M2}
GLUCOSE BLD-MCNC: 111 MG/DL (ref 70–99)
GLUCOSE BLD-MCNC: 118 MG/DL (ref 65–105)
GLUCOSE BLD-MCNC: 122 MG/DL (ref 65–105)
GLUCOSE BLD-MCNC: 70 MG/DL (ref 65–105)
GLUCOSE BLD-MCNC: 95 MG/DL (ref 65–105)
HCT VFR BLD CALC: 35 % (ref 36–46)
HEMOGLOBIN: 11.8 G/DL (ref 12–16)
HEPATITIS C ANTIBODY: NONREACTIVE
IMMATURE GRANULOCYTES: ABNORMAL %
LYMPHOCYTES # BLD: 14 % (ref 24–44)
MCH RBC QN AUTO: 32.5 PG (ref 26–34)
MCHC RBC AUTO-ENTMCNC: 33.7 G/DL (ref 31–37)
MCV RBC AUTO: 96.3 FL (ref 80–100)
MONOCYTES # BLD: 5 % (ref 1–7)
NRBC AUTOMATED: ABNORMAL PER 100 WBC
PDW BLD-RTO: 13.2 % (ref 11.5–14.9)
PLATELET # BLD: 241 K/UL (ref 150–450)
PLATELET ESTIMATE: ABNORMAL
PMV BLD AUTO: 7.5 FL (ref 6–12)
POTASSIUM SERPL-SCNC: 4 MMOL/L (ref 3.7–5.3)
RBC # BLD: 3.63 M/UL (ref 4–5.2)
RBC # BLD: ABNORMAL 10*6/UL
SEG NEUTROPHILS: 80 % (ref 36–66)
SEGMENTED NEUTROPHILS ABSOLUTE COUNT: 10.7 K/UL (ref 1.3–9.1)
SODIUM BLD-SCNC: 133 MMOL/L (ref 135–144)
TOTAL PROTEIN, URINE: 126 MG/DL
TOTAL PROTEIN: 5.8 G/DL (ref 6.4–8.3)
URINE TOTAL PROTEIN CREATININE RATIO: 1.39 (ref 0–0.2)
WBC # BLD: 13.4 K/UL (ref 3.5–11)
WBC # BLD: ABNORMAL 10*3/UL

## 2021-03-31 PROCEDURE — 84156 ASSAY OF PROTEIN URINE: CPT

## 2021-03-31 PROCEDURE — 82947 ASSAY GLUCOSE BLOOD QUANT: CPT

## 2021-03-31 PROCEDURE — 82570 ASSAY OF URINE CREATININE: CPT

## 2021-03-31 PROCEDURE — 36415 COLL VENOUS BLD VENIPUNCTURE: CPT

## 2021-03-31 PROCEDURE — 1220000000 HC SEMI PRIVATE OB R&B

## 2021-03-31 PROCEDURE — 6360000002 HC RX W HCPCS: Performed by: STUDENT IN AN ORGANIZED HEALTH CARE EDUCATION/TRAINING PROGRAM

## 2021-03-31 PROCEDURE — 88307 TISSUE EXAM BY PATHOLOGIST: CPT

## 2021-03-31 PROCEDURE — 7200000001 HC VAGINAL DELIVERY

## 2021-03-31 PROCEDURE — 2580000003 HC RX 258: Performed by: STUDENT IN AN ORGANIZED HEALTH CARE EDUCATION/TRAINING PROGRAM

## 2021-03-31 PROCEDURE — 59409 OBSTETRICAL CARE: CPT | Performed by: OBSTETRICS & GYNECOLOGY

## 2021-03-31 PROCEDURE — 80053 COMPREHEN METABOLIC PANEL: CPT

## 2021-03-31 PROCEDURE — 85025 COMPLETE CBC W/AUTO DIFF WBC: CPT

## 2021-03-31 PROCEDURE — 6370000000 HC RX 637 (ALT 250 FOR IP): Performed by: STUDENT IN AN ORGANIZED HEALTH CARE EDUCATION/TRAINING PROGRAM

## 2021-03-31 RX ORDER — DOCUSATE SODIUM 100 MG/1
100 CAPSULE, LIQUID FILLED ORAL 2 TIMES DAILY
Status: DISCONTINUED | OUTPATIENT
Start: 2021-03-31 | End: 2021-04-01 | Stop reason: HOSPADM

## 2021-03-31 RX ORDER — DOCUSATE SODIUM 100 MG/1
100 CAPSULE, LIQUID FILLED ORAL 2 TIMES DAILY PRN
Qty: 60 CAPSULE | Refills: 0 | Status: SHIPPED | OUTPATIENT
Start: 2021-03-31 | End: 2021-10-04 | Stop reason: ALTCHOICE

## 2021-03-31 RX ORDER — BISACODYL 10 MG
10 SUPPOSITORY, RECTAL RECTAL DAILY PRN
Status: DISCONTINUED | OUTPATIENT
Start: 2021-03-31 | End: 2021-04-01 | Stop reason: HOSPADM

## 2021-03-31 RX ORDER — ACETAMINOPHEN 500 MG
1000 TABLET ORAL EVERY 6 HOURS PRN
Status: DISCONTINUED | OUTPATIENT
Start: 2021-03-31 | End: 2021-04-01 | Stop reason: HOSPADM

## 2021-03-31 RX ORDER — ONDANSETRON 2 MG/ML
4 INJECTION INTRAMUSCULAR; INTRAVENOUS EVERY 4 HOURS PRN
Status: DISCONTINUED | OUTPATIENT
Start: 2021-03-31 | End: 2021-04-01 | Stop reason: HOSPADM

## 2021-03-31 RX ORDER — SIMETHICONE 80 MG
80 TABLET,CHEWABLE ORAL EVERY 6 HOURS PRN
Status: DISCONTINUED | OUTPATIENT
Start: 2021-03-31 | End: 2021-04-01 | Stop reason: HOSPADM

## 2021-03-31 RX ORDER — DEXTROSE MONOHYDRATE 50 MG/ML
100 INJECTION, SOLUTION INTRAVENOUS PRN
Status: DISCONTINUED | OUTPATIENT
Start: 2021-03-31 | End: 2021-04-01 | Stop reason: HOSPADM

## 2021-03-31 RX ORDER — IBUPROFEN 600 MG/1
600 TABLET ORAL EVERY 6 HOURS PRN
Qty: 60 TABLET | Refills: 1 | Status: SHIPPED | OUTPATIENT
Start: 2021-03-31 | End: 2021-08-31 | Stop reason: ALTCHOICE

## 2021-03-31 RX ORDER — NAPROXEN 500 MG/1
500 TABLET ORAL EVERY 12 HOURS
Status: DISCONTINUED | OUTPATIENT
Start: 2021-03-31 | End: 2021-04-01 | Stop reason: HOSPADM

## 2021-03-31 RX ORDER — NICOTINE POLACRILEX 4 MG
15 LOZENGE BUCCAL PRN
Status: DISCONTINUED | OUTPATIENT
Start: 2021-03-31 | End: 2021-04-01 | Stop reason: HOSPADM

## 2021-03-31 RX ORDER — LANOLIN 100 %
OINTMENT (GRAM) TOPICAL PRN
Status: DISCONTINUED | OUTPATIENT
Start: 2021-03-31 | End: 2021-04-01 | Stop reason: HOSPADM

## 2021-03-31 RX ORDER — DEXTROSE MONOHYDRATE 25 G/50ML
12.5 INJECTION, SOLUTION INTRAVENOUS PRN
Status: DISCONTINUED | OUTPATIENT
Start: 2021-03-31 | End: 2021-04-01 | Stop reason: HOSPADM

## 2021-03-31 RX ADMIN — ACETAMINOPHEN 1000 MG: 500 TABLET, FILM COATED ORAL at 13:50

## 2021-03-31 RX ADMIN — ACETAMINOPHEN 1000 MG: 500 TABLET, FILM COATED ORAL at 21:25

## 2021-03-31 RX ADMIN — DOCUSATE SODIUM 100 MG: 100 CAPSULE, LIQUID FILLED ORAL at 21:25

## 2021-03-31 RX ADMIN — NAPROXEN 500 MG: 500 TABLET ORAL at 12:09

## 2021-03-31 RX ADMIN — Medication 125 ML/HR: at 10:24

## 2021-03-31 RX ADMIN — BENZOCAINE AND LEVOMENTHOL: 200; 5 SPRAY TOPICAL at 12:09

## 2021-03-31 RX ADMIN — SODIUM CHLORIDE: 9 INJECTION, SOLUTION INTRAVENOUS at 06:52

## 2021-03-31 ASSESSMENT — PAIN SCALES - GENERAL
PAINLEVEL_OUTOF10: 6
PAINLEVEL_OUTOF10: 7

## 2021-03-31 ASSESSMENT — PAIN DESCRIPTION - DESCRIPTORS: DESCRIPTORS: CRAMPING

## 2021-03-31 NOTE — PROGRESS NOTES
Labor Progress Note    Raissa España is a 30 y.o. female Q2S8572 at 41w10d  The patient was seen and examined. Her pain is well controlled. She reports fetal movement is present, complains of contractions, complains of loss of fluid, denies vaginal bleeding.        Vital Signs:  Vitals:    03/30/21 1904 03/30/21 2001 03/30/21 2026 03/30/21 2100   BP: (!) 106/55 (!) 117/55 118/60 118/62   Pulse: 78 78 83 80   Resp: 16 16 16 16   Temp: 97.3 °F (36.3 °C) 97.5 °F (36.4 °C)     TempSrc: Infrared Infrared     SpO2: 98% 97% 98% 97%   Weight:       Height:             FHT: 130, moderate variability, accelerations present, decelerations absent  Contractions: irregular, every 2-3 minutes    Chaperone for Intimate Exam: Chaperone was present for entire exam, Chaperone Name: Natanael Perez RN  Cervical Exam: 4 cm dilated, 60 effaced, -1 station  Pitocin: @ 6 mu/min    Membranes: Ruptured clear fluid  Scalp Electrode in place: absent  Intrauterine Pressure Catheter in Place: absent    Interventions: none    Assessment/Plan:  Raissa España is a 30 y.o. female T7K1058 at 41w10d admitted for PROM   - GBS negative, No indication for GBS prophylaxis   - VSS, afebrile   - cEFM/TOCO - Cat 1 tracing, contractions q 2-3 min   - Epidural in place   - Continue pitocin per protocol   - Continue expectant management        Attending updated and in agreement with plan    Mervin Fernandez DO  Ob/Gyn Resident  3/30/2021, 10:06 PM

## 2021-03-31 NOTE — PLAN OF CARE
Problem: Falls - Risk of:  Goal: Will remain free from falls  Outcome: Completed  Goal: Absence of physical injury  Outcome: Completed     Problem: Anxiety:  Goal: Level of anxiety will decrease  Outcome: Completed     Problem: Breathing Pattern - Ineffective:  Goal: Able to breathe comfortably  Outcome: Completed     Problem: Fluid Volume - Imbalance:  Goal: Absence of imbalanced fluid volume signs and symptoms  Outcome: Completed  Goal: Absence of intrapartum hemorrhage signs and symptoms  Outcome: Completed

## 2021-03-31 NOTE — ANESTHESIA PROCEDURE NOTES
Epidural Block    Patient location during procedure: OB  Start time: 3/30/2021 9:21 PM  End time: 3/30/2021 9:30 PM  Reason for block: labor epidural  Staffing  Performed: anesthesiologist   Anesthesiologist: Wero Harrell MD  Preanesthetic Checklist  Completed: patient identified, IV checked, site marked, risks and benefits discussed, surgical consent, monitors and equipment checked, pre-op evaluation, timeout performed, anesthesia consent given, oxygen available and patient being monitored  Epidural  Patient position: sitting  Prep: Betadine  Patient monitoring: continuous pulse ox and frequent blood pressure checks  Approach: midline  Location: lumbar (1-5)  Injection technique: NATO air  Provider prep: mask, sterile gloves and sterile gown  Needle  Needle type: Tuohy   Needle gauge: 17 G  Needle length: 3.5 in  Needle insertion depth: 7 cm  Catheter type: end hole  Catheter size: 19 G  Catheter at skin depth: 11 cm  Test dose: negative  Assessment  Sensory level: T10  Hemodynamics: stable  Attempts: 2

## 2021-03-31 NOTE — CARE COORDINATION
Education information given to mother and she verbalizes understanding about the following:  Understanding your baby's  screening tests pamphlet. Hour for [employer]. Patient Safety Education. Infant security including the four band system and the HUGS system. Skin to Skin Contact for You and Your Baby. Benefits of breastfeeding. QR codes for videos online including: Breastfeeding Massage/Hand Express, Breastfeeding Positions, and Breastfeeding latch. Risks of formula given and discussed with mother. What do the experts say about the use of pacifiers/supplementation of a  infant? Safe sleep for your baby (supplied by [address])    Mother encouraged to review pamphlets and watch videos (if able). Mother chooses to bottle feed.

## 2021-03-31 NOTE — PLAN OF CARE
Problem: Falls - Risk of:  Goal: Will remain free from falls  5/45/6208 9385 by Garett Cabral RN  Outcome: Completed  3/31/2021 0149 by Juan Manuel Salvador RN  Outcome: Ongoing  Goal: Absence of physical injury  7/69/5101 9310 by Garett Cabral RN  Outcome: Completed  3/31/2021 0149 by Juan Manuel Salvador RN  Outcome: Ongoing     Problem: Anxiety:  Goal: Level of anxiety will decrease  1/61/1891 2978 by Garett Cabral RN  Outcome: Completed  3/31/2021 0149 by Juan Manuel Salvador RN  Outcome: Ongoing     Problem: Breathing Pattern - Ineffective:  Goal: Able to breathe comfortably  7/96/1193 1519 by Garett Cabral RN  Outcome: Completed  3/31/2021 0149 by Juan Manuel Salvador RN  Outcome: Ongoing     Problem: Fluid Volume - Imbalance:  Goal: Absence of imbalanced fluid volume signs and symptoms  9/64/0332 2655 by Garett Cabral RN  Outcome: Completed  3/31/2021 0149 by Juan Manuel Salvador RN  Outcome: Ongoing  Goal: Absence of intrapartum hemorrhage signs and symptoms  8/53/9573 6974 by Garett Cabral RN  Outcome: Completed  3/31/2021 0149 by Juan Manuel Salvador RN  Outcome: Ongoing

## 2021-03-31 NOTE — PROGRESS NOTES
Labor Progress Note    Cuong Philip is a 30 y.o. female [id number] at 41w10d  The patient was seen and examined. Her pain is well controlled. She reports fetal movement is present, complains of contractions, complains of loss of fluid, denies vaginal bleeding.        Vital Signs:  Vitals:    03/30/21 2234 03/30/21 2235 03/30/21 2248 03/30/21 2356   BP: (!) 145/60 (!) 124/59 (!) 117/58    Pulse: 80 73 67    Resp:   16    Temp:    96.6 °F (35.9 °C)   TempSrc:    Infrared   SpO2: 99% 98% 98%    Weight:       Height:             FHT: 140, moderate variability, accelerations present, decelerations absent  Contractions: regular, every 3 minutes    Chaperone for Intimate Exam: Chaperone was present for entire exam, Chaperone Name: Rae Barraza RN  Cervical Exam: 5 cm dilated, 60 effaced, -1 station  Pitocin: @ 8 mu/min    Membranes: Ruptured clear fluid  Scalp Electrode in place: absent  Intrauterine Pressure Catheter in Place: absent    Interventions: none    Assessment/Plan:  Cuong Philip is a 30 y.o. female H1X0854 at 41w10d admitted for PROM (clear) @ 1300 (3/30)              - GBS negative, No indication for GBS prophylaxis              - VSS, afebrile              - cEFM/TOCO - Cat 1 tracing, contractions q 3 min              - Epidural in place              - Continue pitocin per protocol              - Continue expectant management         Attending updated and in agreement with plan    Nicole Tabares DO  Ob/Gyn Resident  3/31/2021, 12:06 AM

## 2021-03-31 NOTE — ANESTHESIA PRE PROCEDURE
Department of Anesthesiology  Preprocedure Note       Name:  Cuong Philip   Age:  30 y.o.  :  1985                                          MRN:  [id number]         Date:  3/30/2021      Surgeon: * No surgeons listed *    Procedure: * No procedures listed *    Medications prior to admission:   Prior to Admission medications    Medication Sig Start Date End Date Taking? Authorizing Provider   acetaminophen (TYLENOL) 500 MG tablet Take 2 tablets by mouth 3 times daily 3/25/21   JUSTIN Leon CNP   Insulin Aspart FlexPen 100 UNIT/ML SOPN Inject 6 Units into the skin 2 times daily Before lunch and dinner. 2/15/21   Historical Provider, MD Mata w/o O-BS-Aiawtmf-FA-DHA (ZATENORAH-PN DHA) 27-0.6-0.4-300 MG CAPS  20   Historical Provider, MD   aspirin 81 MG EC tablet Take 81 mg by mouth daily    Historical Provider, MD MCCARTY Alcohol Swabs 70 % PADS USE AS DIRECTED four times a day 10/19/20   Historical Provider, MD   blood glucose monitor kit and supplies Dispense sufficient amount for indicated testing frequency plus additional to accommodate PRN testing needs. Dispense all needed supplies to include: monitor, strips, lancing device, lancets, control solutions, alcohol swabs. 10/16/20   JUSTIN Leon CNP   blood glucose monitor strips Test 4 times a day & as needed for symptoms of irregular blood glucose. Dispense sufficient amount for indicated testing frequency plus additional to accommodate PRN testing needs.  10/16/20   JUSTIN Leon CNP   Lancets MISC 1 each by Does not apply route 4 times daily 10/16/20   Yusuf Chavis APRN - CNP       Current medications:    Current Facility-Administered Medications   Medication Dose Route Frequency Provider Last Rate Last Admin    acetaminophen (TYLENOL) tablet 1,000 mg  1,000 mg Oral Q6H PRN Lucio Becca, DO        sodium chloride flush 0.9 % injection 10 mL  10 mL Intravenous 2 times per day Nicole Tabares DO        sodium chloride Z98.890, Z87.19    Hepatomegaly R16.0    Varicose veins of both lower extremities with pain I83.813    BMI 35.0-35.9,adult Z68.35    Need for vaccination Z23    Hx GDMA1 and 2 O09.292, Z86.32    Pregestational diabetes mellitus, modified White class B O24.319    Tobacco abuse Z72.0    Dyslipidemia E78.5    Exposure to hepatitis C Z20.5    Encounter for supervision of high risk pregnancy in third trimester, antepartum O09.93    Morbid obesity with BMI of 40.0-44.9, adult (HCC) E66.01, Z68.41    37 weeks gestation of pregnancy Z3A.37    Fatty liver disease, nonalcoholic X24.5    Depression F32.9    SAB x1 O36.9    Grand multipara Z64.1    Noncompliance Z91.19       Past Medical History:        Diagnosis Date    Abnormal Pap smear     LSIL    Blood type A+     Bone disease     fibrousdysplasia    Depression 3/30/2021    Gestational diabetes     2 out of 3 preg== 1st-diet control----2nd-insulin/diet    Liver disease     Menarche @12y/o    Parity       --  vag del -- ALVERTO 9/10/2013    Postpartum depression 2011    Dx pp depression, put on Prozac    Venous insufficiency of right leg        Past Surgical History:        Procedure Laterality Date    DILATION AND CURETTAGE      DILATION AND CURETTAGE OF UTERUS      LEG SURGERY Right 10/2012    right leg d/t fibrousdysplaia    UMBILICAL HERNIA REPAIR N/A     HERNIA UMBILICAL REPAIR W/MESH performed by Sedrick Kimble MD at [address]       Social History:    Social History     Tobacco Use    Smoking status: Current Every Day Smoker     Packs/day: 0.25     Years: 17.00     Pack years: 4.25     Types: Cigarettes    Smokeless tobacco: Never Used    Tobacco comment: \"3cigs/day\" 3/22/2021   Substance Use Topics    Alcohol use: Not Currently                                Ready to quit: Not Answered  Counseling given: Not Answered  Comment: \"3cigs/day\" 3/22/2021      Vital Signs (Current):   Vitals:    21 1851 21 1856 03/30/21 1904 03/30/21 2001   BP:   (!) 106/55 (!) 117/55   Pulse:   78 78   Resp:   16 16   Temp:   97.3 °F (36.3 °C) 97.5 °F (36.4 °C)   TempSrc:   Infrared Infrared   SpO2: 97% 98% 98% 97%   Weight:       Height:                                                  BP Readings from Last 3 Encounters:   03/30/21 (!) 117/55   03/25/21 108/65   03/22/21 127/68       NPO Status:                                                                                 BMI:   Wt Readings from Last 3 Encounters:   03/30/21 245 lb (111.1 kg)   03/25/21 245 lb 6.4 oz (111.3 kg)   03/22/21 247 lb (112 kg)     Body mass index is 40.77 kg/m². CBC:   Lab Results   Component Value Date    WBC 11.6 03/30/2021    RBC 3.75 03/30/2021    RBC 3.76 06/05/2012    HGB 12.3 03/30/2021    HCT 36.0 03/30/2021    MCV 96.0 03/30/2021    RDW 13.3 03/30/2021     03/30/2021     06/05/2012       CMP:   Lab Results   Component Value Date     07/30/2019    K 4.0 07/30/2019     07/30/2019    CO2 20 07/30/2019    BUN 12 07/30/2019    CREATININE 0.48 07/30/2019    GFRAA >60 07/30/2019    LABGLOM >60 07/30/2019    GLUCOSE 178 10/12/2020    GLUCOSE 93 07/30/2019    GLUCOSE 76 01/11/2012    PROT 6.9 07/30/2019    CALCIUM 8.9 07/30/2019    BILITOT 0.18 07/30/2019    ALKPHOS 56 07/30/2019    AST 13 07/30/2019    ALT 13 07/30/2019       POC Tests: No results for input(s): POCGLU, POCNA, POCK, POCCL, POCBUN, POCHEMO, POCHCT in the last 72 hours.     Coags: No results found for: PROTIME, INR, APTT    HCG (If Applicable):   Lab Results   Component Value Date    PREGTESTUR positive 09/17/2020    HCG NEGATIVE 04/25/2019    HCGQUANT 82,150 (H) 09/17/2020        ABGs: No results found for: PHART, PO2ART, PAR6BPR, QZE7KGY, BEART, Q1GJQNZE     Type & Screen (If Applicable):  No results found for: LABABO, LABRH    Drug/Infectious Status (If Applicable):  Lab Results   Component Value Date    HEPCAB NONREACTIVE 11/10/2017       COVID-19 Screening (If Applicable):   Lab Results   Component Value Date    COVID19 Not Detected 2021           Anesthesia Evaluation  Patient summary reviewed and Nursing notes reviewed no history of anesthetic complications:   Airway: Mallampati: III  TM distance: >3 FB   Neck ROM: full  Mouth opening: > = 3 FB Dental: normal exam         Pulmonary:normal exam  breath sounds clear to auscultation  (+) asthma: current smoker                           Cardiovascular:    (+) hyperlipidemia        Rhythm: regular  Rate: normal                    Neuro/Psych:   (+) psychiatric history:depression/anxiety             GI/Hepatic/Renal:   (+) liver disease (Fatty liver disease):, morbid obesity          Endo/Other:    (+) Diabetes (Gestational Diabetes)using insulin, . ROS comment:   GA - 37weeks     Abdominal:           Vascular:   + PVD, aortic or cerebral (Varicose veins of both lower extremities with pain), . Anesthesia Plan      epidural     ASA 3             Anesthetic plan and risks discussed with patient.                       Bernabe Ruiz MD   3/30/2021

## 2021-03-31 NOTE — PROGRESS NOTES
Value Ref Range    Glucose 111 (H) 70 - 99 mg/dL    BUN 4 (L) 6 - 20 mg/dL    CREATININE <0.40 (L) 0.50 - 0.90 mg/dL    Bun/Cre Ratio NOT REPORTED 9 - 20    Calcium 8.4 (L) 8.6 - 10.4 mg/dL    Sodium 133 (L) 135 - 144 mmol/L    Potassium 4.0 3.7 - 5.3 mmol/L    Chloride 103 98 - 107 mmol/L    CO2 21 20 - 31 mmol/L    Anion Gap 9 9 - 17 mmol/L    Alkaline Phosphatase 129 (H) 35 - 104 U/L    ALT 13 5 - 33 U/L    AST 25 <32 U/L    Total Bilirubin 0.18 (L) 0.3 - 1.2 mg/dL    Total Protein 5.8 (L) 6.4 - 8.3 g/dL    Albumin 2.9 (L) 3.5 - 5.2 g/dL    Albumin/Globulin Ratio NOT REPORTED 1.0 - 2.5    GFR Non- CANNOT BE CALCULATED >60 mL/min    GFR  CANNOT BE CALCULATED >60 mL/min    GFR Comment          GFR Staging NOT REPORTED    POC Glucose Fingerstick    Collection Time: 03/31/21  1:56 PM   Result Value Ref Range    POC Glucose 95 65 - 105 mg/dL   Protein / Creatinine Ratio, Urine    Collection Time: 03/31/21  2:43 PM   Result Value Ref Range    Total Protein, Urine 126 mg/dL    Creatinine, Ur 90.8 28.0 - 217.0 mg/dL    Urine Total Protein Creatinine Ratio 1.39 (H) 0.00 - 0.20           Plan discussed with attending.        Bobby Quintana DO  Ob/Gyn Resident  Pager: [phone]  [facility]   2/52/25578:34 PM

## 2021-03-31 NOTE — PROGRESS NOTES
Labor Progress Note    Linda Rojo is a 30 y.o. female [id number] at 38w0d  The patient was seen and examined. Her pain is well controlled. She reports fetal movement is present, complains of contractions, complains of loss of fluid, denies vaginal bleeding.        Vital Signs:  Vitals:    03/31/21 0148 03/31/21 0203 03/31/21 0219 03/31/21 0233   BP: 124/74 130/79 (!) 104/59 (!) 97/56   Pulse: 68 69 65 62   Resp: 14 14 14 14   Temp:   96.4 °F (35.8 °C)    TempSrc:   Infrared    SpO2: 97% 97% 98% 98%   Weight:       Height:             FHT: 120, moderate variability, accelerations present, decelerations absent  Contractions: regular, every 2 minutes    Chaperone for Intimate Exam: Chaperone was present for entire exam, Chaperone Name: Noberto Primrose RN  Cervical Exam: 5 cm dilated, 60 effaced, -1 station  Pitocin: @ 16 mu/min    Membranes: Ruptured clear fluid  Scalp Electrode in place: absent  Intrauterine Pressure Catheter in Place: absent    Interventions: none    Assessment/Plan:  Linda Rojo is a 30 y.o. female [id number] at 38w0d admitted for PROM (clear) @ 1300 (3/30)              - GBS negative, No indication for GBS prophylaxis              - VSS, afebrile              - cEFM/TOCO - Cat 1 tracing, contractions q 2 min              - Epidural in place              - Continue pitocin per protocol        Attending updated and in agreement with plan    Fela Tate DO  Ob/Gyn Resident  3/31/2021, 3:21 AM

## 2021-03-31 NOTE — PLAN OF CARE
Problem: Falls - Risk of:  Goal: Will remain free from falls  Description: Will remain free from falls  Outcome: Ongoing  Goal: Absence of physical injury  Description: Absence of physical injury  Outcome: Ongoing     Problem: Anxiety:  Goal: Level of anxiety will decrease  Description: Level of anxiety will decrease  Outcome: Ongoing     Problem: Breathing Pattern - Ineffective:  Goal: Able to breathe comfortably  Description: Able to breathe comfortably  Outcome: Ongoing     Problem: Fluid Volume - Imbalance:  Goal: Absence of imbalanced fluid volume signs and symptoms  Description: Absence of imbalanced fluid volume signs and symptoms  Outcome: Ongoing  Goal: Absence of intrapartum hemorrhage signs and symptoms  Description: Absence of intrapartum hemorrhage signs and symptoms  Outcome: Ongoing     Problem: Infection - Intrapartum Infection:  Goal: Will show no infection signs and symptoms  Description: Will show no infection signs and symptoms  Outcome: Ongoing     Problem: Labor Process - Prolonged:  Goal: Labor progression, first stage, within specified pattern  Description: Labor progression, first stage, within specified pattern  Outcome: Ongoing  Goal: Labor progession, second stage, within specified pattern  Description: Labor progession, second stage, within specified pattern  Outcome: Ongoing  Goal: Uterine contractions within specified parameters  Description: Uterine contractions within specified parameters  Outcome: Ongoing     Problem:  Screening:  Goal: Ability to make informed decisions regarding treatment has improved  Description: Ability to make informed decisions regarding treatment has improved  Outcome: Ongoing     Problem: Pain - Acute:  Goal: Pain level will decrease  Description: Pain level will decrease  Outcome: Ongoing  Goal: Able to cope with pain  Description: Able to cope with pain  Outcome: Ongoing     Problem: Tissue Perfusion - Uteroplacental, Altered:  Goal: Absence of abnormal fetal heart rate pattern  Description: Absence of abnormal fetal heart rate pattern  Outcome: Ongoing     Problem: Urinary Retention:  Goal: Experiences of bladder distention will decrease  Description: Experiences of bladder distention will decrease  Outcome: Ongoing  Goal: Urinary elimination within specified parameters  Description: Urinary elimination within specified parameters  Outcome: Ongoing

## 2021-03-31 NOTE — PROGRESS NOTES
2048 Anesthesia notified of request for epidural.  2112 -  Dr. Yoly Medrano at bedside and consent form signed. Everyone in room is wearing a mask. Risks and benefits discussed and patient verbalizes understanding. Patient verbalizes to proceed with procedure. Time out performed. 2051  - to dangle position. 2121  - procedure started. 2134  - test dose given. Patient tolerated procedure well. 2140 -to low fowlers position with hip wedge in place. 2144  -first dose given. 2146 - epidural pump started. See anesthesia note.

## 2021-03-31 NOTE — PROGRESS NOTES
Abdomen: Abdomen soft, non-tender. BS normal. No masses,  No organomegaly  Fundus: non-tender, firm, below umbilicus  Extremities:  no calf tenderness, non edematous    Lab:  Lab Results   Component Value Date    HGB 11.8 (L) 2021     Lab Results   Component Value Date    HCT 35.0 (L) 2021       Assessment/Plan:  1. Theron Bolanos is a [id number] PPD # 1 s/p    - Doing well, VSS, some elevated pressures, no severe   - female infant in [address]   - Encourage ambulation   - Postpartum Hgb 11.8  2. Rh positive/Rubella immune  3. Bottle feeding   4. PreE w/o SF's (newly diagnosed)  - VSS overnight, some elevated pressures, no severe ranges  - PreE labs wnl, P/C 1.39 (3/31)  - Denies s/s PreE  - Continue to monitor closely  5. Pregestational DM  - FBS ordered for this AM  - Humalog 0/ - held  - FBS and 2hr PP blood sugars  6. Tobacco use  - Encouraged cessation  7. Noncompliance  - SW consult PP   8. BMI 40  9. Continue post partum care    Counseling Completed:  Secondary Smoke risks and Sudden Infant Death Syndrome were reviewed with recommendations. Infant sleeping, \"back to sleep\" and avoidance of co-sleeping recommendations were reviewed. Signs and Symptoms of Post Partum Depression were reviewed. The patient is to call if any occur. Signs and symptoms of Mastitis were reviewed. The patient is to call if any occur for follow up. Discharge instructions including pelvic rest, no driving with pain medicine and office follow-up were reviewed with patient     Attending Physician: Dr. Mariangel Cunningham DO  Ob/Gyn Resident   2021, 12:48 AM   I have discussed the care of Des Armijo, including pertinent history and exam findings, with the resident. I have seen and examined the patient and the key elements of all parts of the encounter have been performed by me. I agree with the assessment, plan and orders as documented by the resident.     Phyllis Ruiz MD  Attending Physician

## 2021-03-31 NOTE — PROGRESS NOTES
Labor Progress Note    Ashlie Hernandez is a 30 y.o. female [id number] at 38w0d  The patient was seen and examined. Her pain is well controlled. She reports fetal movement is present, complains of contractions, complains of loss of fluid, denies vaginal bleeding.        Vital Signs:  Vitals:    03/31/21 0233 03/31/21 0248 03/31/21 0304 03/31/21 0318   BP: (!) 97/56 98/62 112/67 120/72   Pulse: 62 65 72 65   Resp: 14 14 14 14   Temp:       TempSrc:       SpO2: 98% 98% 97% 99%   Weight:       Height:             FHT: 120, moderate variability, accelerations present, decelerations absent  Contractions: regular, every 1-2 minutes    Chaperone for Intimate Exam: Chaperone was present for entire exam, Chaperone Name: Clyde Patel RN  Cervical Exam: 5 cm dilated, 60 effaced, -1 station  Pitocin: @ 20 mu/min    Membranes: Ruptured clear fluid  Scalp Electrode in place: absent  Intrauterine Pressure Catheter in Place: absent    Interventions: none    Assessment/Plan:  Ashlie Hernandez is a 30 y.o. female [id number] at 38w0d admitted for PROM (clear) @ 1300 (3/30)              - GBS negative, No indication for GBS prophylaxis              - VSS, afebrile              - cEFM/TOCO - Cat 1 tracing, contractions q 1-2 min              - Epidural in place              - Continue pitocin per protocol        Attending updated and in agreement with plan    Mackenzie Mejias DO  Ob/Gyn Resident  3/31/2021, 5:30 AM

## 2021-04-01 VITALS
RESPIRATION RATE: 16 BRPM | DIASTOLIC BLOOD PRESSURE: 68 MMHG | BODY MASS INDEX: 40.82 KG/M2 | TEMPERATURE: 97 F | HEART RATE: 78 BPM | SYSTOLIC BLOOD PRESSURE: 126 MMHG | HEIGHT: 65 IN | OXYGEN SATURATION: 98 % | WEIGHT: 245 LBS

## 2021-04-01 LAB
GLUCOSE BLD-MCNC: 82 MG/DL (ref 65–105)
GLUCOSE BLD-MCNC: 84 MG/DL (ref 65–105)

## 2021-04-01 PROCEDURE — 6370000000 HC RX 637 (ALT 250 FOR IP): Performed by: STUDENT IN AN ORGANIZED HEALTH CARE EDUCATION/TRAINING PROGRAM

## 2021-04-01 PROCEDURE — 99232 SBSQ HOSP IP/OBS MODERATE 35: CPT | Performed by: SPECIALIST

## 2021-04-01 PROCEDURE — 82947 ASSAY GLUCOSE BLOOD QUANT: CPT

## 2021-04-01 RX ADMIN — NAPROXEN 500 MG: 500 TABLET ORAL at 00:15

## 2021-04-01 RX ADMIN — DOCUSATE SODIUM 100 MG: 100 CAPSULE, LIQUID FILLED ORAL at 08:59

## 2021-04-01 ASSESSMENT — PAIN SCALES - GENERAL: PAINLEVEL_OUTOF10: 4

## 2021-04-01 NOTE — CARE COORDINATION
Referral received for depression; review of EMR indicated pt has dx of depression. Met with pt. Pt denied hx of depression or tx of any kind. She did identify a difficult time when her relationship with her significant other was struggling. Pt denied interest in mental health information. She has 5 other children at home aged from 15 to 10 y/o and also lives with her significant other. They have been together for the past 16 yrs. Pt has all necessary provisions for baby and plans to return to full time work after her maternity leave. Pt feels safe returning home with baby. No additional needs at this time.

## 2021-04-01 NOTE — PLAN OF CARE
Problem: Discharge Planning:  Goal: Discharged to appropriate level of care  Description: Discharged to appropriate level of care  4/1/2021 0625 by Connor Mireles RN  Outcome: Met This Shift  9/24/9797 1026 by Bertha Callahan RN  Outcome: Ongoing     Problem: Pain - Acute:  Goal: Pain level will decrease  Description: Pain level will decrease  4/1/2021 0625 by Connor Mireles RN  Outcome: Met This Shift  5/97/3678 4810 by Bertha Callahan RN  Outcome: Ongoing     Problem: Pain:  Goal: Pain level will decrease  Description: Pain level will decrease  4/1/2021 0625 by Connor Mireles RN  Outcome: Met This Shift  9/02/6842 5989 by Bertha Callahan RN  Outcome: Ongoing  Goal: Control of acute pain  Description: Control of acute pain  Outcome: Met This Shift  Goal: Control of chronic pain  Description: Control of chronic pain  Outcome: Met This Shift

## 2021-04-01 NOTE — ANESTHESIA POSTPROCEDURE EVALUATION
Department of Anesthesiology  Postprocedure Note    Patient: Lion Kemp  MRN: [id number]  YOB: 1985  Date of evaluation: 4/1/2021  Time:  7:32 AM     Procedure Summary     Date: 03/30/21 Room / Location:     Anesthesia Start: 2120 Anesthesia Stop: 03/31/21 0943    Procedure: Labor Analgesia Diagnosis:     Scheduled Providers:  Responsible Provider: Wero Harrell MD    Anesthesia Type: epidural ASA Status: 3          Anesthesia Type: epidural    Ambrocio Phase I: Ambrocio Score: 10    Ambrocio Phase II:      Last vitals: Reviewed and per EMR flowsheets.        Anesthesia Post Evaluation    Comments: POST- ANESTHESIA EVALUATION       Pt Name: Lion Kemp  MRN: [id number]  YOB: 1985  Date of evaluation: 4/1/2021  Time:  7:32 AM      /64   Pulse 72   Temp 97.2 °F (36.2 °C) (Infrared)   Resp 16   Ht 5' 5\" (1.651 m)   Wt 245 lb (111.1 kg)   LMP 01/10/2020   SpO2 98%   Breastfeeding Unknown   BMI 40.77 kg/m²      Consciousness Level  Awake  Cardiopulmonary Status  Stable  Pain Adequately Treated YES  Nausea / Vomiting  NO  Adequate Hydration  YES  Anesthesia Related Complications NONE      Electronically signed by Noé Arellano MD on 4/1/2021 at 7:32 AM

## 2021-04-01 NOTE — FLOWSHEET NOTE
Discharge instructions given per order, patient signs and states understanding. Copies given. Rx given to patient to fill at their choice of pharmacy.

## 2021-04-02 LAB — SURGICAL PATHOLOGY REPORT: NORMAL

## 2021-04-15 ENCOUNTER — POSTPARTUM VISIT (OUTPATIENT)
Dept: OBGYN CLINIC | Age: 36
End: 2021-04-15

## 2021-04-15 VITALS
BODY MASS INDEX: 38.94 KG/M2 | DIASTOLIC BLOOD PRESSURE: 105 MMHG | WEIGHT: 234 LBS | HEART RATE: 70 BPM | SYSTOLIC BLOOD PRESSURE: 160 MMHG

## 2021-04-15 PROCEDURE — 99024 POSTOP FOLLOW-UP VISIT: CPT | Performed by: SPECIALIST

## 2021-04-15 RX ORDER — NIFEDIPINE 60 MG/1
60 TABLET, EXTENDED RELEASE ORAL DAILY
Qty: 30 TABLET | Refills: 3 | Status: SHIPPED | OUTPATIENT
Start: 2021-04-15 | End: 2021-04-20 | Stop reason: ALTCHOICE

## 2021-04-15 ASSESSMENT — ENCOUNTER SYMPTOMS
CONSTIPATION: 0
NAUSEA: 0
EYE PAIN: 0
DIARRHEA: 0
APNEA: 0
ABDOMINAL DISTENTION: 0
VOMITING: 0
COUGH: 0
ABDOMINAL PAIN: 0

## 2021-04-15 NOTE — PROGRESS NOTES
Postpartum Visit: Patient is here today for postpartum vaginal delivery. I have fully reviewed the prenatal and intrapartum course. She is 2 weeks postpartum. Patient is bottle feeding. Her bleeding is heavy. Patient's pain is 0 out of 10 on the pain scale. Patient is not sexually active. Current contraception method is none. Postpartum depression screening questionnaire provided to patient and is negative.

## 2021-04-15 NOTE — PROGRESS NOTES
Subjective:      Patient ID: Alec Medellin is a 30 y.o. female. Chief Complaint   Patient presents with    Postpartum Care     BP (!) 160/105   Pulse 70   Wt 234 lb (106.1 kg)   LMP 01/10/2020   Breastfeeding No   BMI 38.94 kg/m²   Patient's last menstrual period was 01/10/2020. [id number]    Past Medical History:   Diagnosis Date    Abnormal Pap smear     LSIL    Blood type A+     Bone disease     fibrousdysplasia    Depression 3/30/2021    Gestational diabetes     2 out of 3 preg== 1st-diet control----2nd-insulin/diet    Liver disease     Menarche @12y/o    Parity       --  vag del -- ALVERTO 9/10/2013    Postpartum depression 2011    Dx pp depression, put on Prozac    PreE w/o SF's (G7) 3/31/2021    Venous insufficiency of right leg      Current Outpatient Medications Ordered in Epic   Medication Sig Dispense Refill    NIFEdipine (NIFEDICAL XL) 60 MG extended release tablet Take 1 tablet by mouth daily 30 tablet 3    ibuprofen (ADVIL;MOTRIN) 600 MG tablet Take 1 tablet by mouth every 6 hours as needed for Pain 60 tablet 1    docusate sodium (COLACE) 100 MG capsule Take 1 capsule by mouth 2 times daily as needed for Constipation 60 capsule 0    acetaminophen (TYLENOL) 500 MG tablet Take 2 tablets by mouth 3 times daily 540 tablet 1    Prenat w/o A-YY-Rvffezu-FA-DHA (ZATEAN-PN DHA) 27-0.6-0.4-300 MG CAPS       Insulin Aspart FlexPen 100 UNIT/ML SOPN Inject 6 Units into the skin 2 times daily Before lunch and dinner.  RA Alcohol Swabs 70 % PADS USE AS DIRECTED four times a day      blood glucose monitor kit and supplies Dispense sufficient amount for indicated testing frequency plus additional to accommodate PRN testing needs. Dispense all needed supplies to include: monitor, strips, lancing device, lancets, control solutions, alcohol swabs.  (Patient not taking: Reported on 4/15/2021) 1 kit 0    blood glucose monitor strips Test 4 times a day & as needed for symptoms of irregular blood glucose. Dispense sufficient amount for indicated testing frequency plus additional to accommodate PRN testing needs. (Patient not taking: Reported on 4/15/2021) 120 strip 5    Lancets MISC 1 each by Does not apply route 4 times daily (Patient not taking: Reported on 4/15/2021) 200 each 0     No current Epic-ordered facility-administered medications on file. Problem List Items Addressed This Visit     Postpartum state - Primary    Postpartum hypertension        Allergies   Allergen Reactions    Morphine Other (See Comments)     Muscle spasms in eyes - bilateral     No orders of the defined types were placed in this encounter. Postpartum Visit: Patient is here today for postpartum vaginal delivery. I have fully reviewed the prenatal and intrapartum course. She is 2 weeks postpartum. Patient is bottle feeding. Her bleeding is heavy. Patient is not sexually active. Current contraception method is none. Postpartum depression screening questionnaire provided to patient and is negative. Review of Systems   Constitutional: Negative for activity change, appetite change and fever. HENT: Negative for ear discharge and ear pain. Eyes: Negative for pain and visual disturbance. Respiratory: Negative for apnea and cough. Cardiovascular: Negative for chest pain, palpitations and leg swelling. Gastrointestinal: Negative for abdominal distention, abdominal pain, constipation, diarrhea, nausea and vomiting. Endocrine: Negative. Genitourinary: Negative for difficulty urinating, dysuria, menstrual problem and pelvic pain. Musculoskeletal: Negative for neck pain and neck stiffness. Skin: Negative. Neurological: Negative for light-headedness and numbness. Hematological: Negative. Does not bruise/bleed easily. Objective:   Physical Exam  Vitals signs and nursing note reviewed. Constitutional:       Appearance: She is well-developed.    HENT:      Head: Normocephalic and atraumatic. Neck:      Musculoskeletal: Normal range of motion and neck supple. Thyroid: No thyromegaly. Cardiovascular:      Rate and Rhythm: Normal rate and regular rhythm. Pulmonary:      Effort: Pulmonary effort is normal.      Breath sounds: Normal breath sounds. No wheezing. Abdominal:      General: Bowel sounds are normal. There is no distension. Palpations: Abdomen is soft. There is no mass. Tenderness: There is no abdominal tenderness. There is no guarding. Musculoskeletal: Normal range of motion. Skin:     General: Skin is dry. Neurological:      Mental Status: She is alert and oriented to person, place, and time. Psychiatric:         Behavior: Behavior normal.         Thought Content: Thought content normal.         Assessment:      Patient 2 weeks post vaginal delivery. Patient was advised to continue to monitor her blood sugar and to make an appointment with her family physician ASAP for further evaluation of diabetes. Patient with elevated blood pressure. Will treat with Procardia. Patient was advised to return in 1 week. Plan:      Orders Placed This Encounter   Medications    NIFEdipine (NIFEDICAL XL) 60 MG extended release tablet     Sig: Take 1 tablet by mouth daily     Dispense:  30 tablet     Refill:  3     Appointment in 1 week. Anne Gerber am scribing for, and in the presence of Dr. Vinita Estrada. Electronically signed by: Sheryl Dietrich 4/15/21 4:22 PM       I agree to the above documentation placed by my scribe Sheryl Dietrich. I reviewed the scribe's note and agree with the documented findings and plan of care. Any areas of disagreement are noted on the chart. I have personally evaluated this patient. Additional findings are as noted. I agree with the chief complaint, past medical history, past surgical history, allergies, medications, social and family history as documented unless otherwise noted below.      Electronically signed by Timmy Green MD on 4/16/2021 at 3:51 AM

## 2021-04-20 ENCOUNTER — OFFICE VISIT (OUTPATIENT)
Dept: FAMILY MEDICINE CLINIC | Age: 36
End: 2021-04-20
Payer: COMMERCIAL

## 2021-04-20 VITALS
SYSTOLIC BLOOD PRESSURE: 130 MMHG | HEIGHT: 65 IN | BODY MASS INDEX: 39.49 KG/M2 | DIASTOLIC BLOOD PRESSURE: 100 MMHG | TEMPERATURE: 97.5 F | WEIGHT: 237 LBS | HEART RATE: 69 BPM | OXYGEN SATURATION: 98 %

## 2021-04-20 DIAGNOSIS — Z23 NEED FOR VARICELLA VACCINE: ICD-10-CM

## 2021-04-20 DIAGNOSIS — O24.419 GESTATIONAL DIABETES MELLITUS (GDM) IN FIRST TRIMESTER, GESTATIONAL DIABETES METHOD OF CONTROL UNSPECIFIED: ICD-10-CM

## 2021-04-20 DIAGNOSIS — K76.0 FATTY LIVER DISEASE, NONALCOHOLIC: ICD-10-CM

## 2021-04-20 DIAGNOSIS — E66.01 CLASS 2 SEVERE OBESITY DUE TO EXCESS CALORIES WITH SERIOUS COMORBIDITY AND BODY MASS INDEX (BMI) OF 39.0 TO 39.9 IN ADULT (HCC): ICD-10-CM

## 2021-04-20 DIAGNOSIS — I10 ESSENTIAL HYPERTENSION: Primary | ICD-10-CM

## 2021-04-20 DIAGNOSIS — E55.9 VITAMIN D INSUFFICIENCY: ICD-10-CM

## 2021-04-20 DIAGNOSIS — E78.5 DYSLIPIDEMIA: ICD-10-CM

## 2021-04-20 PROCEDURE — G8417 CALC BMI ABV UP PARAM F/U: HCPCS | Performed by: FAMILY MEDICINE

## 2021-04-20 PROCEDURE — G8427 DOCREV CUR MEDS BY ELIG CLIN: HCPCS | Performed by: FAMILY MEDICINE

## 2021-04-20 PROCEDURE — 1111F DSCHRG MED/CURRENT MED MERGE: CPT | Performed by: FAMILY MEDICINE

## 2021-04-20 PROCEDURE — 99214 OFFICE O/P EST MOD 30 MIN: CPT | Performed by: FAMILY MEDICINE

## 2021-04-20 PROCEDURE — 4004F PT TOBACCO SCREEN RCVD TLK: CPT | Performed by: FAMILY MEDICINE

## 2021-04-20 RX ORDER — LISINOPRIL 10 MG/1
20 TABLET ORAL DAILY
Qty: 60 TABLET | Refills: 1 | Status: SHIPPED | OUTPATIENT
Start: 2021-04-20 | Stop reason: SDUPTHER

## 2021-04-20 RX ORDER — BLOOD PRESSURE TEST KIT
KIT MISCELLANEOUS
Qty: 1 KIT | Refills: 1 | Status: SHIPPED | OUTPATIENT
Start: 2021-04-20

## 2021-04-20 ASSESSMENT — ENCOUNTER SYMPTOMS
ABDOMINAL PAIN: 0
RESPIRATORY NEGATIVE: 1
SHORTNESS OF BREATH: 0

## 2021-04-20 NOTE — PROGRESS NOTES
[facility]  [facility] [address] [facility] [address].  [address] [address]  Dept: [address] (:  1985) is a 30 y.o. female,Established patient, here for evaluation of the following chief complaint(s):  Chief Complaint   Patient presents with    Blood Pressure Check     was high at obgyn office just had a baby         SUBJECTIVE/OBJECTIVE:  HPI  Nirali Ellis is a 30 y.o. female patient. Patient is an established patient. She is 2 months post partum. She now has 6 children. She still does not use birth control. HYPERTENSION  Nirali Ellis has a well controlled hypertension. she is currently on nifedipine which was started by gynecology. Patient stopped medication since it was causing some dizziness and headache. Patient was never on any medication for this in the past, Patient's most recent BP in the office was stable. she reports stable BP readings at home. Patient denies any adverse reaction to this therapy. she denies any CP, SOB, HA, or palpitations. Patient admits to exercising and adheres to low salt diet. No history of organ damage due to condition noted. We will start Lisinopril. Lab Results   Component Value Date/Time    CREATININE <0.40 (L) 2021 01:10 PM     GESTATIONAL DIABETES MELLITUS  Patient has a  stable Diabetes Mellitus. Current therapy includes Aspart on Sliding scale. Patient have never have to use any insulin lately. Patient is responding well with this therapy. Patient reports home glucose monitoring as Stable readings. Patient denieshypoglycemia episodes such as{symptoms. Patient denies neither vomiting nor diarrhea. Lab Results   Component Value Date/Time    LABA1C 5.8 10/29/2020 08:13 AM    LABA1C 5.8 2019 04:17 PM      DYSLIPIDEMIA  Nirali Ellis has a known history of dyslipidemia. Patient is on OTC Fish Oil to help improve dyslipidemia. Patient denies adverse reaction to this therapy.  The patient is not known to have coexisting coronary artery disease. ASCVD Score below. The 10-year CVD risk score (RHODA'Geoffrey, et al., 2008) is: 8.9%    Values used to calculate the score:      Age: 30 years      Sex: Female      Diabetic: Yes      Tobacco smoker: Yes      Systolic Blood Pressure: 481 mmHg      Is BP treated: Yes      HDL Cholesterol: 37 mg/dL      Total Cholesterol: 166 mg/dL  Lab Results   Component Value Date/Time    CHOLFAST 166 09/12/2019 04:17 PM    CHOL 188 06/23/2012 02:02 PM    HDL 37 (L) 09/12/2019 04:17 PM    LDLCHOLESTEROL 112 09/12/2019 04:17 PM    TRIGLYCFAST 85 09/12/2019 04:17 PM    CHOLHDLRATIO 4.5 09/12/2019 04:17 PM    VLDL NOT REPORTED 09/12/2019 04:17 PM     VITAMIN D  Patient has a known Vitamin D Deficiency. she had a course of supplementation for 12 weeks. she is due to get levels check. We continue to discuss ways to manage this condition. We also discussed ways to improve the levels. Such as learning food groups that are high in Vitamin D. We also discuss that sun exposure is beneficial however, too much sun and sun damage can potentially result in skin cancer. Lab Results   Component Value Date/Time    VITD25 21.6 (L) 09/12/2019 04:17 PM      FATTY LIVER  Lab Results   Component Value Date/Time    ALKPHOS 129 (H) 03/31/2021 01:10 PM    ALT 13 03/31/2021 01:10 PM    AST 25 03/31/2021 01:10 PM    BILITOT 0.18 (L) 03/31/2021 01:10 PM    PROT 5.8 (L) 03/31/2021 01:10 PM    LABALBU 2.9 (L) 03/31/2021 01:10 PM      OBESITY  Patient's BMI is Body mass index is 39.44 kg/m². kg/m2. BMI is increasing. Patient understands that this condition increases the patient's risk for chronic conditions. Patient advised to practice healthy eating habits. Diet should include plenty of fruits, vegetables, whole grains, lean protein, and low-fat dairy. Increase water consumption. Reduce consumption of dietary sugar and sugar-sweetened beverages. Increasing physical exercises at least 3-4 times a week. motion. Skin:     General: Skin is warm and dry. Capillary Refill: Capillary refill takes less than 2 seconds. Neurological:      Mental Status: She is alert and oriented to person, place, and time. Psychiatric:         Mood and Affect: Mood is anxious. Speech: Speech is rapid and pressured. Behavior: Behavior normal.         Thought Content: Thought content does not include homicidal or suicidal ideation. Diagnosis Date    Abnormal Pap smear     LSIL    Blood type A+     Bone disease     fibrousdysplasia    Depression 3/30/2021    Gestational diabetes     2 out of 3 preg== 1st-diet control----2nd-insulin/diet    Liver disease     Menarche @12y/o    Parity       --  vag del -- ALVERTO 9/10/2013    Postpartum depression 2011    Dx pp depression, put on Prozac    PreE w/o SF's (G7) 3/31/2021    Venous insufficiency of right leg       Prior to Visit Medications    Medication Sig Taking? Authorizing Provider   lisinopril (PRINIVIL;ZESTRIL) 10 MG tablet Take 2 tablets by mouth daily Yes JUSTIN Urena CNP   Blood Pressure Monitor KIT Use as directed. Yes JUSTIN Urena CNP   ibuprofen (ADVIL;MOTRIN) 600 MG tablet Take 1 tablet by mouth every 6 hours as needed for Pain Yes Clyde Flood, DO   docusate sodium (COLACE) 100 MG capsule Take 1 capsule by mouth 2 times daily as needed for Constipation Yes Ever Chaves,    acetaminophen (TYLENOL) 500 MG tablet Take 2 tablets by mouth 3 times daily Yes JUSTIN Smith CNP   Insulin Aspart FlexPen 100 UNIT/ML SOPN Inject 6 Units into the skin 2 times daily Before lunch and dinner. Yes Historical Provider, MD   Prenolga w/o O-SD-Hfptzaa-FA-DHA (ANU-PN DHA) 27-0.6-0.4-300 MG CAPS  Yes Historical Provider, MD   RA Alcohol Swabs 70 % PADS USE AS DIRECTED four times a day Yes Historical Provider, MD       ASSESSMENT/PLAN:  1.  Essential hypertension  Worsening  Continue current therapy. DISCUSSED AND ADVISED TO:  Cut down on your salt intake. Cut down on caffeinated drinks, sports drinks. Instructed to check BP at home regularly. Report for any chest pains, shortness of breath, headaches, and lightheadedness. Call the office if your blood pressure continue to be higher than 140/90 or 90/50.      - Urinalysis Reflex to Culture; Future  - Comprehensive Metabolic Panel; Future  - lisinopril (PRINIVIL;ZESTRIL) 10 MG tablet; Take 2 tablets by mouth daily  Dispense: 60 tablet; Refill: 1  - Blood Pressure Monitor KIT; Use as directed. Dispense: 1 kit; Refill: 1  - CBC Auto Differential; Future    2. Gestational diabetes mellitus (GDM) in first trimester, gestational diabetes method of control unspecified  Stable  Continue therapy. We will continue to monitor Hemoglobin A1c   DISCUSSED and ADVISED TO:  Continue to check Glucose levels at home. Report increased and low levels as discussed. Decrease carbohydrates, sugary drinks, desserts in your diet. Exercise regularly, as tolerated. Try to lose weight.      - Hemoglobin A1C; Future  - CBC Auto Differential; Future    3. Dyslipidemia  Likely stable  Continue therapy. Advised to decrease the consumption of red meats, fried foods, trans fats, sweets, sugary beverages. Advised to increase fish, vegetables, and fruits consumption. Advised to add fiber or OTC supplements in diet. Discussed weight loss which will result in improvement of lipids levels. Advised to increase daily physical activities and add regular exercises. - Lipid Panel; Future  - CBC Auto Differential; Future    4. Fatty liver disease, nonalcoholic  Likely stable  Continue to monitor    - Lipid Panel; Future    5. Vitamin D insufficiency  Continue Vitamin D supplementation  DISCUSSED AND ADVISED TO:  Foods that contain a lot of vitamin D includes Center Sandwich, tuna, and mackerel.    Cheese, egg yolks, and beef liver have small amounts of vit D.  Milk, soy drinks, orange juice, yogurt, margarine, and some kinds of cereal have vitamin D added to them. Continue to use sunblock when out in the sun to prevent skin cancer.    - Vitamin D 25 Hydroxy; Future    6. Class 2 severe obesity due to excess calories with serious comorbidity and body mass index (BMI) of 39.0 to 39.9 in adult (HCC)  BMI increasing  DISCUSSED AND ADVISED TO:  Eat a low-fat and low carbohydrates diet. Avoid fried foods especially fast food. Choose healthier options for snacks. Have 5-6 servings of fruits and vegetables per day. Cut down on eating processed food. Add 30 minutes to 1 hour aerobic exercise for 3-4 days a week. 7. Need for varicella vaccine  Recommended    - Varicella Zoster Antibody, IgG; Future       Return in about 4 weeks (around 5/18/2021) for Rev. results, HTN. On this date 4/20/2021 I have spent 25 minutes reviewing previous notes, test results and face to face with the patient discussing the diagnosis and importance of compliance with the treatment plan as well as documenting on the day of the visit. This note was completed by using the assistance of a speech-recognition program. However, inadvertent computerized transcription errors may be present. Although every effort was made to ensure accuracy, no guarantees can be provided that every mistake has been identified and corrected by editing.   Electronically signed by JUSTIN Hay CNP on 4/19/21 at 9:56 PM EDT     --JUSTIN Hay CNP

## 2021-04-20 NOTE — PATIENT INSTRUCTIONS
[facility] COVID-19 Vaccination Hotline: [phone]    COVID-19 vaccine appointments are not available through our practice. As you're eligible to receive the COVID-19 vaccine, as determined by your state's department of health, you will be able to schedule an appointment through [address] or by calling [phone]. Appointments are required to receive the COVID-19 vaccine. As vaccine supply continues to be limited, we anticipate open appointments to fill up quickly and appreciate your patience as we work through the process of providing vaccines to those in our communities. Schedule a Vaccine  When you qualify to receive the vaccine, call the [facility]) COVID-19 Vaccination Hotline to schedule your appointment or to get additional information about the [facility] locations which are offering the COVID-19 vaccine. To be 94% effective, it's important that you receive two doses of one of the COVID-19 vaccines. -If you are receiving the Melvin Peter vaccine, your second shot will be scheduled as close to 21 days after the first shot as possible. -If you are receiving the Moderna vaccine, your second shot will be scheduled as close to 28 days after the first shot as possible. Links to [facility]) website and [facility] website:    [employer]/[facility]-monitoring-coronavirus-covid-19/covid-19-vaccine/[place]/lal-vaccine    https://[employer] App[employer]/covidvaccine    airpim  https://sites. google. com/view/wchd[place]-coronavirus/home/vaccines/get-vaccinated  [employer].fi. com/r/[place]_Vaccine_On-Call_List      https://[employer]/shared/[email or web address]?:toolbar=n&:display_count=n&:origin=viz_share_link&:embed=y    PHARMACY LOCATIONS  Https://vaccine. coronavirus. [place].gov/    [facility]  Https://[employer]/shared/[email or web address]?:toolbar=n&:display_count=n&:origin=viz_share_link&:embed=zaira CLAYTON [facility]  Https://[employer]/shared/[email or web address]?:toolbar=n&:display_count=n&:origin=[employer]_share_link&:embed=y    [address]  Https://[employer]/shared/[email or web address]?:toolbar=n&:display_count=n&:origin=[employer]_link&:embed=y    [address]  https://[employer]/shared/[email or web address]?:toolbar=n&:display_count=n&:origin=[employer]_link&:embed=y

## 2021-04-21 ENCOUNTER — TELEPHONE (OUTPATIENT)
Dept: FAMILY MEDICINE CLINIC | Age: 36
End: 2021-04-21

## 2021-04-26 ENCOUNTER — HOSPITAL ENCOUNTER (OUTPATIENT)
Age: 36
Setting detail: SPECIMEN
Discharge: HOME | End: 2021-04-26
Payer: COMMERCIAL

## 2021-04-26 DIAGNOSIS — O24.419 GESTATIONAL DIABETES MELLITUS (GDM) IN FIRST TRIMESTER, GESTATIONAL DIABETES METHOD OF CONTROL UNSPECIFIED: ICD-10-CM

## 2021-04-26 DIAGNOSIS — Z23 NEED FOR VARICELLA VACCINE: ICD-10-CM

## 2021-04-26 DIAGNOSIS — I10 ESSENTIAL HYPERTENSION: ICD-10-CM

## 2021-04-26 DIAGNOSIS — K76.0 FATTY LIVER DISEASE, NONALCOHOLIC: ICD-10-CM

## 2021-04-26 DIAGNOSIS — E78.5 DYSLIPIDEMIA: ICD-10-CM

## 2021-04-26 DIAGNOSIS — E55.9 VITAMIN D INSUFFICIENCY: ICD-10-CM

## 2021-04-26 LAB
-: NORMAL
ABSOLUTE EOS #: 0.2 K/UL (ref 0–0.4)
ABSOLUTE IMMATURE GRANULOCYTE: ABNORMAL K/UL (ref 0–0.3)
ABSOLUTE LYMPH #: 1.8 K/UL (ref 1–4.8)
ABSOLUTE MONO #: 0.5 K/UL (ref 0.1–1.3)
ALBUMIN SERPL-MCNC: 4.2 G/DL (ref 3.5–5.2)
ALBUMIN/GLOBULIN RATIO: ABNORMAL (ref 1–2.5)
ALP BLD-CCNC: 92 U/L (ref 35–104)
ALT SERPL-CCNC: 22 U/L (ref 5–33)
ANION GAP SERPL CALCULATED.3IONS-SCNC: 9 MMOL/L (ref 9–17)
AST SERPL-CCNC: 26 U/L
BASOPHILS # BLD: 0 % (ref 0–2)
BASOPHILS ABSOLUTE: 0 K/UL (ref 0–0.2)
BILIRUB SERPL-MCNC: 0.3 MG/DL (ref 0.3–1.2)
BUN BLDV-MCNC: 11 MG/DL (ref 6–20)
BUN/CREAT BLD: ABNORMAL (ref 9–20)
CALCIUM SERPL-MCNC: 8.8 MG/DL (ref 8.6–10.4)
CHLORIDE BLD-SCNC: 105 MMOL/L (ref 98–107)
CHOLESTEROL/HDL RATIO: 4.3
CHOLESTEROL: 233 MG/DL
CO2: 24 MMOL/L (ref 20–31)
CREAT SERPL-MCNC: 0.43 MG/DL (ref 0.5–0.9)
DIFFERENTIAL TYPE: ABNORMAL
EOSINOPHILS RELATIVE PERCENT: 2 % (ref 0–4)
ESTIMATED AVERAGE GLUCOSE: 120 MG/DL
GFR AFRICAN AMERICAN: >60 ML/MIN
GFR NON-AFRICAN AMERICAN: >60 ML/MIN
GFR SERPL CREATININE-BSD FRML MDRD: ABNORMAL ML/MIN/{1.73_M2}
GFR SERPL CREATININE-BSD FRML MDRD: ABNORMAL ML/MIN/{1.73_M2}
GLUCOSE BLD-MCNC: 97 MG/DL (ref 70–99)
HBA1C MFR BLD: 5.8 % (ref 4–6)
HCT VFR BLD CALC: 43.8 % (ref 36–46)
HDLC SERPL-MCNC: 54 MG/DL
HEMOGLOBIN: 14.4 G/DL (ref 12–16)
IMMATURE GRANULOCYTES: ABNORMAL %
LDL CHOLESTEROL: 155 MG/DL (ref 0–130)
LYMPHOCYTES # BLD: 17 % (ref 24–44)
MCH RBC QN AUTO: 31.7 PG (ref 26–34)
MCHC RBC AUTO-ENTMCNC: 32.9 G/DL (ref 31–37)
MCV RBC AUTO: 96.4 FL (ref 80–100)
MONOCYTES # BLD: 4 % (ref 1–7)
NRBC AUTOMATED: ABNORMAL PER 100 WBC
PDW BLD-RTO: 13.3 % (ref 11.5–14.9)
PLATELET # BLD: 255 K/UL (ref 150–450)
PLATELET ESTIMATE: ABNORMAL
PMV BLD AUTO: 8.4 FL (ref 6–12)
POTASSIUM SERPL-SCNC: 4.3 MMOL/L (ref 3.7–5.3)
RBC # BLD: 4.55 M/UL (ref 4–5.2)
RBC # BLD: ABNORMAL 10*6/UL
REASON FOR REJECTION: NORMAL
SEG NEUTROPHILS: 77 % (ref 36–66)
SEGMENTED NEUTROPHILS ABSOLUTE COUNT: 8.3 K/UL (ref 1.3–9.1)
SODIUM BLD-SCNC: 138 MMOL/L (ref 135–144)
TOTAL PROTEIN: 6.9 G/DL (ref 6.4–8.3)
TRIGL SERPL-MCNC: 120 MG/DL
VITAMIN D 25-HYDROXY: 16.7 NG/ML (ref 30–100)
VLDLC SERPL CALC-MCNC: ABNORMAL MG/DL (ref 1–30)
WBC # BLD: 10.9 K/UL (ref 3.5–11)
WBC # BLD: ABNORMAL 10*3/UL
ZZ NTE CLEAN UP: ORDERED TEST: NORMAL
ZZ NTE WITH NAME CLEAN UP: SPECIMEN SOURCE: NORMAL

## 2021-04-26 PROCEDURE — 36415 COLL VENOUS BLD VENIPUNCTURE: CPT

## 2021-04-26 PROCEDURE — 80053 COMPREHEN METABOLIC PANEL: CPT

## 2021-04-26 PROCEDURE — 82306 VITAMIN D 25 HYDROXY: CPT

## 2021-04-26 PROCEDURE — 83036 HEMOGLOBIN GLYCOSYLATED A1C: CPT

## 2021-04-26 PROCEDURE — 86787 VARICELLA-ZOSTER ANTIBODY: CPT

## 2021-04-26 PROCEDURE — 85025 COMPLETE CBC W/AUTO DIFF WBC: CPT

## 2021-04-26 PROCEDURE — 80061 LIPID PANEL: CPT

## 2021-04-28 ENCOUNTER — TELEPHONE (OUTPATIENT)
Dept: FAMILY MEDICINE CLINIC | Age: 36
End: 2021-04-28

## 2021-04-28 DIAGNOSIS — E78.2 MIXED HYPERLIPIDEMIA: ICD-10-CM

## 2021-04-28 DIAGNOSIS — E55.9 VITAMIN D DEFICIENCY: Primary | ICD-10-CM

## 2021-04-28 DIAGNOSIS — E78.5 DYSLIPIDEMIA: ICD-10-CM

## 2021-04-28 LAB — VZV IGG SER QL IA: 1.91

## 2021-04-28 RX ORDER — PRAVASTATIN SODIUM 20 MG
20 TABLET ORAL DAILY
Qty: 30 TABLET | Refills: 5 | Status: SHIPPED | OUTPATIENT
Start: 2021-04-28 | End: 2021-08-31 | Stop reason: SDUPTHER

## 2021-04-29 NOTE — TELEPHONE ENCOUNTER
Pt informed
10:45 AM        Vitamin D Screening  Please let the patient know that the patient's recent vitamin d level was insufficient. I will send an oral supplementation that needs to be taken weekly. We will be checking his levels on an annual basis from now on. We can discuss this condition further on her next visit. Lab Results   Component Value Date/Time    VITD25 16.7 (L) 04/26/2021 10:45 AM         Patient's lipids test results Please let the patient know that her  cholesterol levels were elevated. I sent a prescription for pravastatin to help improve her  cholesterol level. Please advise the patient to:  Cut down on red meats and trans fats in their diet. Increase physical activity. Add some regular exercise at least 3 times a week on their routine  Try to lose weight to help improve their cholesterol levels. Thanks. .  Lab Results   Component Value Date/Time    CHOLFAST 166 09/12/2019 04:17 PM    CHOL 233 (H) 04/26/2021 10:45 AM    HDL 54 04/26/2021 10:45 AM    LDLCHOLESTEROL 155 (H) 04/26/2021 10:45 AM    TRIG 120 04/26/2021 10:45 AM    CHOLHDLRATIO 4.3 04/26/2021 10:45 AM    VLDL NOT REPORTED 04/26/2021 10:45 AM     Patient's Varicella titers came back normal. There will not be any need for any booster dose.    Lab Results   Component Value Date/Time    VARICELABIGG 1.91 04/26/2021 10:45 AM

## 2021-05-13 ENCOUNTER — POSTPARTUM VISIT (OUTPATIENT)
Dept: OBGYN CLINIC | Age: 36
End: 2021-05-13
Payer: COMMERCIAL

## 2021-05-13 VITALS
SYSTOLIC BLOOD PRESSURE: 121 MMHG | DIASTOLIC BLOOD PRESSURE: 80 MMHG | WEIGHT: 230 LBS | HEART RATE: 72 BPM | BODY MASS INDEX: 38.27 KG/M2

## 2021-05-13 DIAGNOSIS — Z32.02 NEGATIVE PREGNANCY TEST: ICD-10-CM

## 2021-05-13 DIAGNOSIS — N93.8 DUB (DYSFUNCTIONAL UTERINE BLEEDING): ICD-10-CM

## 2021-05-13 LAB
CONTROL: NORMAL
PREGNANCY TEST URINE, POC: NORMAL

## 2021-05-13 PROCEDURE — G8427 DOCREV CUR MEDS BY ELIG CLIN: HCPCS | Performed by: CLINICAL NURSE SPECIALIST

## 2021-05-13 PROCEDURE — 81025 URINE PREGNANCY TEST: CPT | Performed by: CLINICAL NURSE SPECIALIST

## 2021-05-13 PROCEDURE — 4004F PT TOBACCO SCREEN RCVD TLK: CPT | Performed by: CLINICAL NURSE SPECIALIST

## 2021-05-13 PROCEDURE — G8417 CALC BMI ABV UP PARAM F/U: HCPCS | Performed by: CLINICAL NURSE SPECIALIST

## 2021-05-13 RX ORDER — ERGOCALCIFEROL 1.25 MG/1
CAPSULE ORAL
COMMUNITY
Start: 2021-04-29 | End: 2021-07-19 | Stop reason: ALTCHOICE

## 2021-05-13 RX ORDER — MEDROXYPROGESTERONE ACETATE 10 MG/1
10 TABLET ORAL DAILY
Qty: 10 TABLET | Refills: 0 | Status: SHIPPED | OUTPATIENT
Start: 2021-05-13 | End: 2021-07-19 | Stop reason: ALTCHOICE

## 2021-05-13 ASSESSMENT — ENCOUNTER SYMPTOMS
RESPIRATORY NEGATIVE: 1
EYES NEGATIVE: 1
ALLERGIC/IMMUNOLOGIC NEGATIVE: 1
GASTROINTESTINAL NEGATIVE: 1

## 2021-05-13 NOTE — PROGRESS NOTES
Postpartum Visit: Patient is here today for postpartum vaginal delivery. I have fully reviewed the prenatal and intrapartum course. She is 6 weeks postpartum. Patient is bottle feeding. Her bleeding is heavy. Patient's pain is 0 out of 10 on the pain scale. Patient is not sexually active. Current contraception method is none. Postpartum depression screening questionnaire provided to patient and is negative.

## 2021-05-13 NOTE — PROGRESS NOTES
MG Porter is a 30 y.o. female  presents for her 6 week postpartum vaginal delivery visit. Patient reports that she is having regular bowel movements, and is urinating without difficulty. Patient states that her bleeding is heavy saturating pads within 2 hours. Patient states that she has not stopped bleeding since delivery. Patient is bottle feeding. Patient denies any mastitis. Patient denies any postpartum depression/baby blues, no suicidal, or homicidal thoughts, and has good support system. Patient denies any pain at this time. [id number]    OB History    Para Term  AB Living   7 5 6 0 1 6   SAB TAB Ectopic Molar Multiple Live Births   1 0 0   0 7      # Outcome Date GA Lbr Randy/2nd Weight Sex Delivery Anes PTL Lv   7 Term 21 38w0d 02:24 / 00:04 6 lb 14.1 oz (3.12 kg) F Vag-Spont EPI N MIKE   6 Term 09/01/15 40w4d  8 lb 8 oz (3.856 kg) M Vag-Spont EPI  MIKE      Birth Comments: Education information given to mother and she verbalizes understanding about the following:  Hour for [employer]. Patient Safety Education. Infant security including the four band system and the HUGS system. Skin to Skin Contact for Yo   5 Term 13 39w0d  7 lb (3.175 kg) F Vag-Spont None N MIKE   4 Term 12 40w0d  7 lb 13 oz (3.544 kg) F Vag-Spont EPI N MIKE      Birth Comments: NO GDM - pitocin induction - AROM   3 Term 11 39w1d  7 lb (3.175 kg) F Vag-Spont EPI N MIKE      Birth Comments: GDM - insulin control   2 Term 09 40w3d  7 lb 8 oz (3.402 kg) M Vag-Spont EPI N MIKE      Birth Comments: GDM - diet controled   1 2007    U    DEC      Birth Comments: 1 st trimester incomplete ab followed by D&C       Blood pressure 121/80, pulse 72, weight 230 lb (104.3 kg), last menstrual period 01/10/2020, not currently breastfeeding.       Patient Active Problem List    Diagnosis Date Noted    Postpartum state 04/15/2021    Postpartum hypertension 04/15/2021     3/31/21 F Apg 8/9 Wt 6#14 03/31/2021    PreE w/o SF's (G7) 03/31/2021     3/31/21: PreE labs wnl, P/C 1.39      Fatty liver disease, nonalcoholic 89/51/2489    Depression 03/30/2021    SAB x1 03/30/2021     Required D&C      Grand multipara 03/30/2021    Noncompliance 03/30/2021    Morbid obesity with BMI of 40.0-44.9, adult ([address] 03/04/2021    Dyslipidemia 10/29/2020    Exposure to hepatitis C 10/29/2020      [facility] ED on 11/10/2017: With a chief complaint of wanting to be tested for hepatitis C. Patient states the father of her children who she is sexually active with has recently been diagnosed with hepatitis C. She is unsure if he uses IV drugs currently.  Tobacco abuse 10/28/2020    Need for vaccination 10/27/2020    Hx GDMA1 and 2 10/27/2020    Pregestational diabetes mellitus, modified White class B 10/27/2020    Varicose veins of both lower extremities with pain 10/28/2019    Hepatomegaly 08/15/2019    S/P hernia repair 83/97/3947     Umbilical hernia repair WITH mesh 4/0171      Umbilical hernia 11/58/0938    Right hip pain 09/24/2012    ABC (aneurysmal bone cyst) 09/24/2012    Gestational diabetes mellitus (GDM) in first trimester         Diagnosis Orders   1. Postpartum care following vaginal delivery     2. DUB (dysfunctional uterine bleeding)  POCT urine pregnancy    medroxyPROGESTERone (PROVERA) 10 MG tablet   3. Negative pregnancy test       Vitals:    05/13/21 1103   BP: 121/80   Pulse: 72          Review of Systems   Constitutional: Negative for chills and fever. HENT: Negative. Eyes: Negative. Respiratory: Negative. Cardiovascular: Negative. Gastrointestinal: Negative. Endocrine: Negative. Genitourinary: Positive for vaginal bleeding (patient states that she has not stopped bleeding since delivery, she reports that she did have brown discharge then it went back to bright red). Negative for dysuria and menstrual problem. Musculoskeletal: Negative. Skin: Negative. Allergic/Immunologic: Negative. Neurological: Negative. Hematological: Negative. Psychiatric/Behavioral: Negative. Physical Exam  Vitals signs reviewed. Constitutional:       Appearance: She is well-developed. HENT:      Head: Normocephalic and atraumatic. Eyes:      Conjunctiva/sclera: Conjunctivae normal.   Neck:      Musculoskeletal: Normal range of motion and neck supple. Cardiovascular:      Rate and Rhythm: Normal rate and regular rhythm. Pulmonary:      Effort: Pulmonary effort is normal.      Breath sounds: Normal breath sounds. Abdominal:      General: Bowel sounds are normal.   Musculoskeletal: Normal range of motion. Skin:     General: Skin is warm and dry. Neurological:      Mental Status: She is oriented to person, place, and time. Psychiatric:         Behavior: Behavior normal.         Thought Content: Thought content normal.         Judgment: Judgment normal.         Assessment       Diagnosis Orders   1. Postpartum care following vaginal delivery     2. DUB (dysfunctional uterine bleeding)  POCT urine pregnancy    medroxyPROGESTERone (PROVERA) 10 MG tablet   3. Negative pregnancy test         Plan      Patient instructed on how to take the provera 10 mg daily for 10 days and if bleeding does not stop by day 14 then she is to schedule an appt. With Dr. Laurin Ormond to discuss options.      Electronically signed by: Vanessa Norman CNP

## 2021-05-17 PROBLEM — I10 ESSENTIAL HYPERTENSION: Status: ACTIVE | Noted: 2021-04-15

## 2021-05-17 PROBLEM — E78.2 MIXED HYPERLIPIDEMIA: Status: ACTIVE | Noted: 2021-05-17

## 2021-05-17 ASSESSMENT — ENCOUNTER SYMPTOMS
RESPIRATORY NEGATIVE: 1
SHORTNESS OF BREATH: 0
ABDOMINAL PAIN: 0

## 2021-05-17 NOTE — PROGRESS NOTES
[facility] PHYSICIANS  [facility] PHYSICI[facility] [address] [facility] [address].  [address] [id number]  Dept: 222 [facility] (:  1985) is a 30 y.o. female,Established patient, here for evaluation of the following chief complaint(s):  Chief Complaint   Patient presents with    Hypertension     BP READINGS    Wrist Pain     RIGHT WRIST HAS BEEN [address] SHE HAD IV IN 2021        SUBJECTIVE/OBJECTIVE:  HPI  Deja Yung is a 30 y.o. female patient. Patient is an established patient. She is 2 months post partum. She now has 6 children. She still does not use birth control- encouraged to do so. Patient is on progesterone- encouraged to discuss with gynecologist.    Sharda Norris has a well controlled hypertension. she is currently on lisinopril 20 bid. .Patient stopped medication since it was causing some dizziness and headache. Patient was never on any medication for this in the past, Patient's most recent BP in the office was stable. she reports fluctuation BP readings at home. Patient denies any adverse reaction to this therapy. she denies any CP, SOB, HA, or palpitations. Patient admits to exercising and adheres to low salt diet. No history of organ damage due to condition noted. Lab Results   Component Value Date/Time    CREATININE 0.43 (L) 2021 10:45 AM     GESTATIONAL DIABEtes/PREDIABETES  Patient has a  stable Diabetes Mellitus. Current therapy includes . Will not start on any therapy. Patient have never have to use any insulin lately. Patient is responding well with this therapy. Patient reports home glucose monitoring as Stable readings. Patient denieshypoglycemia episodes such as{symptoms. Patient denies neither vomiting nor diarrhea. Lab Results   Component Value Date/Time    LABA1C 5.8 2021 10:45 AM    LABA1C 5.8 10/29/2020 08:13 AM      HYPERLIPIDEMIA  Deja Yung is currently on pravastatin (Pravachol).  Restarted it.Patient denies adverse reaction to this medication. Compliance with treatment thus far has been good. The patient is not known to have coexisting coronary artery disease. The 10-year CVD risk score (RHODA'Geoffrey, et al., 2008) is: 10.6%    Values used to calculate the score:      Age: 30 years      Sex: Female      Diabetic: Yes      Tobacco smoker: Yes      Systolic Blood Pressure: 137 mmHg      Is BP treated: Yes      HDL Cholesterol: 54 mg/dL      Total Cholesterol: 233 mg/dL  Lab Results   Component Value Date/Time    CHOL 233 (H) 04/26/2021 10:45 AM    HDL 54 04/26/2021 10:45 AM    LDLCHOLESTEROL 155 (H) 04/26/2021 10:45 AM    TRIGLYCFAST 85 09/12/2019 04:17 PM    CHOLHDLRATIO 4.3 04/26/2021 10:45 AM    TRIG 120 04/26/2021 10:45 AM    VLDL NOT REPORTED 04/26/2021 10:45 AM     VITAMIN D  Patient has a known Vitamin D Deficiency. she had a course of supplementation for 12 weeks. she is due to get levels check. We continue to discuss ways to manage this condition. We also discussed ways to improve the levels. Such as learning food groups that are high in Vitamin D. We also discuss that sun exposure is beneficial however, too much sun and sun damage can potentially result in skin cancer. Lab Results   Component Value Date/Time    VITD25 16.7 (L) 04/26/2021 10:45 AM      FATTY LIVER  Had some great improvements with her liver function. Patient is very happy about this results. Patient had made some big changes with her diet and at also stopped drinking alcohol. Patient was getting very anxious about this condition since [de-identified] of the death in the family was from liver cirrhosis and kidney failure. Lab Results   Component Value Date/Time    ALKPHOS 92 04/26/2021 10:45 AM    ALT 22 04/26/2021 10:45 AM    AST 26 04/26/2021 10:45 AM    BILITOT 0.30 04/26/2021 10:45 AM    PROT 6.9 04/26/2021 10:45 AM    LABALBU 4.2 04/26/2021 10:45 AM      OBESITY  Patient's BMI is Body mass index is 39.54 kg/m². kg/m2.  BMI is increasing. spoke about adipex- will start when baby sleeps at night. Patient understands that this condition increases the patient's risk for chronic conditions. Patient advised to practice healthy eating habits. Diet should include plenty of fruits, vegetables, whole grains, lean protein, and low-fat dairy. Increase water consumption. Reduce consumption of dietary sugar and sugar-sweetened beverages. Increasing physical exercises at least 3-4 times a week. We will monitor progression of condition. Patient's blood count is WNL. Lab Results   Component Value Date    WBC 10.9 04/26/2021    HGB 14.4 04/26/2021    HCT 43.8 04/26/2021    MCV 96.4 04/26/2021     04/26/2021    LYMPHOPCT 17 (L) 04/26/2021    RBC 4.55 04/26/2021    MCH 31.7 04/26/2021    MCHC 32.9 04/26/2021    RDW 13.3 04/26/2021       THYROID FUNCTION  Melissa Alvarez  's most recent TSH level was normal. Results reviewed with the patient. Patient denies any history of thyroid disorders. Lab Results   Component Value Date/Time    TSH 0.73 10/12/2020 10:55 PM      Lonnie Ibanez 's RENAL FUNCTION was found WNL. Lab Results   Component Value Date     04/26/2021    K 4.3 04/26/2021     04/26/2021    CO2 24 04/26/2021    BUN 11 04/26/2021    CREATININE 0.43 (L) 04/26/2021    GLUCOSE 97 04/26/2021    CALCIUM 8.8 04/26/2021    PROT 6.9 04/26/2021    GFR      04/26/2021    GFR NOT REPORTED 04/26/2021     VARICELLA TITER  Melissa Alvarez  's recent Varicella titers came back normal. There will not be any need for any booster dose.  Results below were reviewed with the patient  Lab Results   Component Value Date/Time    VARICELABIGG 1.91 04/26/2021 10:45 AM      Vitals:    05/18/21 1428   BP: 132/82   Site: Left Upper Arm   Pulse: 82   Temp: 97.6 °F (36.4 °C)   SpO2: 98%   Weight: 237 lb 9.6 oz (107.8 kg)   Height: 5' 5\" (1.651 m)   Estimated body mass index is 39.54 kg/m² as calculated from the following:    Height as of this hypertension  Stable  Continue current therapy. DISCUSSED AND ADVISED TO:  Cut down on your salt intake. Cut down on caffeinated drinks, sports drinks. Instructed to check BP at home regularly. Report for any chest pains, shortness of breath, headaches, and lightheadedness. Call the office if your blood pressure continue to be higher than 140/90 or 90/50.      - lisinopril (PRINIVIL;ZESTRIL) 20 MG tablet; Take 1 tablet by mouth 2 times daily  Dispense: 180 tablet; Refill: 2    2. Mixed hyperlipidemia  Stable  Continue therapy. Advised to decrease the consumption of red meats, fried foods, trans fats, sweets, sugary beverages. Advised to increase fish, vegetables, and fruits consumption. Advised to add fiber or OTC supplements in diet. Discussed weight loss which will result in improvement of lipids levels. Advised to increase daily physical activities and add regular exercises. 3. Fatty liver disease, nonalcoholic  Improving  Continue to monitor      4. Prediabetes  Improving  DISCUSSED and ADVISED TO:  Decrease carbohydrates, sugary drinks, desserts   Exercise regularly, as tolerated. Try to lose weight. 5. Vitamin D deficiency  Continue Vitamin D supplementation  DISCUSSED AND ADVISED TO:  Foods that contain a lot of vitamin D includes Mathews, tuna, and mackerel. Cheese, egg yolks, and beef liver have small amounts of vit D.  Milk, soy drinks, orange juice, yogurt, margarine, and some kinds of cereal have vitamin D added to them. Continue to use sunblock when out in the sun to prevent skin cancer.    - vitamin D (CHOLECALCIFEROL) 25 MCG (1000 UT) TABS tablet; Take 1 tablet by mouth daily  Dispense: 90 tablet; Refill: 5    6. Class 2 severe obesity with serious comorbidity and body mass index (BMI) of 39.0 to 39.9 in adult, unspecified obesity type ([address]  BMI decreasing  DISCUSSED AND ADVISED TO:  Eat a low-fat and low carbohydrates diet. Avoid fried foods especially fast food.   Choose healthier options for snacks. Have 5-6 servings of fruits and vegetables per day. Cut down on eating processed food. Add 30 minutes to 1 hour aerobic exercise for 3-4 days a week. Return in about 2 months (around 7/18/2021) for Chronic conditions, 30mins. I affirm this is a Patient Initiated Episode with a Patient who has not had a related appointment within my department in the past 7 days or scheduled within the next 24 hours. Total Time: minutes: 21-30 minutes           This note was completed by using the assistance of a speech-recognition program. However, inadvertent computerized transcription errors may be present. Although every effort was made to ensure accuracy, no guarantees can be provided that every mistake has been identified and corrected by editing.   Electronically signed by JUSTIN Bowman CNP on 4/19/21 at 9:56 PM EDT     --JUSTIN Bowman CNP

## 2021-05-18 ENCOUNTER — OFFICE VISIT (OUTPATIENT)
Dept: FAMILY MEDICINE CLINIC | Age: 36
End: 2021-05-18
Payer: COMMERCIAL

## 2021-05-18 VITALS
OXYGEN SATURATION: 98 % | DIASTOLIC BLOOD PRESSURE: 82 MMHG | WEIGHT: 237.6 LBS | TEMPERATURE: 97.6 F | SYSTOLIC BLOOD PRESSURE: 132 MMHG | HEIGHT: 65 IN | HEART RATE: 82 BPM | BODY MASS INDEX: 39.58 KG/M2

## 2021-05-18 DIAGNOSIS — E78.2 MIXED HYPERLIPIDEMIA: ICD-10-CM

## 2021-05-18 DIAGNOSIS — K76.0 FATTY LIVER DISEASE, NONALCOHOLIC: ICD-10-CM

## 2021-05-18 DIAGNOSIS — E66.01 CLASS 2 SEVERE OBESITY WITH SERIOUS COMORBIDITY AND BODY MASS INDEX (BMI) OF 39.0 TO 39.9 IN ADULT, UNSPECIFIED OBESITY TYPE (HCC): ICD-10-CM

## 2021-05-18 DIAGNOSIS — E55.9 VITAMIN D DEFICIENCY: ICD-10-CM

## 2021-05-18 DIAGNOSIS — R73.03 PREDIABETES: ICD-10-CM

## 2021-05-18 DIAGNOSIS — I10 ESSENTIAL HYPERTENSION: Primary | ICD-10-CM

## 2021-05-18 PROCEDURE — 4004F PT TOBACCO SCREEN RCVD TLK: CPT | Performed by: FAMILY MEDICINE

## 2021-05-18 PROCEDURE — G8427 DOCREV CUR MEDS BY ELIG CLIN: HCPCS | Performed by: FAMILY MEDICINE

## 2021-05-18 PROCEDURE — 99214 OFFICE O/P EST MOD 30 MIN: CPT | Performed by: FAMILY MEDICINE

## 2021-05-18 PROCEDURE — G8417 CALC BMI ABV UP PARAM F/U: HCPCS | Performed by: FAMILY MEDICINE

## 2021-05-18 RX ORDER — LISINOPRIL 20 MG/1
20 TABLET ORAL 2 TIMES DAILY
Qty: 180 TABLET | Refills: 2 | Status: SHIPPED | OUTPATIENT
Start: 2021-05-18 | End: 2022-09-08 | Stop reason: SDUPTHER

## 2021-05-18 ASSESSMENT — PATIENT HEALTH QUESTIONNAIRE - PHQ9
SUM OF ALL RESPONSES TO PHQ9 QUESTIONS 1 & 2: 0
SUM OF ALL RESPONSES TO PHQ QUESTIONS 1-9: 0
2. FEELING DOWN, DEPRESSED OR HOPELESS: 0
1. LITTLE INTEREST OR PLEASURE IN DOING THINGS: 0

## 2021-05-18 NOTE — PATIENT INSTRUCTIONS
[facility]  [email or web address]?:toolbar=n&:display_count=n&:origin=[employer]_share_link&:embed=y    [address]  [email or web address]?:toolbar=n&:display_count=n&:origin=[employer]_share_link&:embed=y    [address]  [email or web address]?:toolbar=n&:display_count=n&:origin=[employer]_share_link&:embed=y    Patient Education        phentermine  Pronunciation:  RHETT tuttle Jose G Estrada  Brand: Adipex-P, [place]  What is the most important information I should know about phentermine? You should not use this medicine if you have glaucoma, overactive thyroid, severe heart problems, uncontrolled high blood pressure, advanced coronary artery disease, extreme agitation, or a history of drug abuse. Do not use this medicine if you have used an MAO inhibitor in the past 14 days, such as isocarboxazid, linezolid, methylene blue injection, phenelzine, rasagiline, selegiline, or tranylcypromine. What is phentermine? Phentermine is similar to an amphetamine. Phentermine stimulates the central nervous system (nerves and brain), which increases your heart rate and blood pressure and decreases your appetite. Phentermine is used together with diet and exercise to treat obesity, especially in people with risk factors such as high blood pressure, high cholesterol, or diabetes. Phentermine may also be used for purposes not listed in this medication guide. What should I discuss with my healthcare provider before taking phentermine? You should not use phentermine if you are allergic to it, or if you have:  · a history of heart disease (coronary artery disease, heart rhythm problems, congestive heart failure, stroke);  · severe or uncontrolled high blood pressure;  · overactive thyroid;  · glaucoma;  · extreme agitation or nervousness;  · a history of drug abuse; or  · if you take other diet pills. Do not use phentermine if you have used an MAO inhibitor in the past 14 days.  A dangerous drug interaction could occur. MAO inhibitors include isocarboxazid, linezolid, methylene blue injection, phenelzine, rasagiline, selegiline, tranylcypromine, and others. Weight loss during pregnancy can harm an unborn baby, even if you are overweight. Do not use phentermine if you are pregnant. Tell your doctor right away if you become pregnant during treatment. You should not breast-feed while using phentermine. Tell your doctor if you have ever had:  · heart disease or coronary artery disease;  · a heart valve disorder;  · high blood pressure;  · diabetes (your diabetes medication dose may need to be adjusted); or  · kidney disease. Phentermine is not approved for use by anyone younger than 12years old. How should I take phentermine? Follow all directions on your prescription label and read all medication guides or instruction sheets. Your doctor may occasionally change your dose. Use the medicine exactly as directed. Phentermine is usually taken before breakfast, or 1 to 2 hours after breakfast. Follow your doctor's dosing instructions very carefully. Never use phentermine in larger amounts, or for longer than prescribed. Taking more of this medication will not make it more effective and can cause serious, life-threatening side effects. Phentermine is for short-term use only. The effects of appetite suppression may wear off after a few weeks. Phentermine may be habit-forming. Misuse can cause addiction, overdose, or death. Selling or giving away this medicine is against the law. Call your doctor at once if you think this medicine is not working as well, or if you have not lost at least 4 pounds within 4 weeks. Do not stop using phentermine suddenly, or you could have unpleasant withdrawal symptoms. Ask your doctor how to safely stop using this medicine. Store at room temperature away from moisture and heat. Keep the bottle tightly closed when not in use.   What happens if I miss a dose?  Take the medicine as soon as you can, but skip the missed dose if it is late in the day. Do not  take two doses at one time. What happens if I overdose? Seek emergency medical attention or call the Poison Help line at 1-815.461.3250. An overdose of phentermine can be fatal.  Overdose symptoms may include confusion, panic, hallucinations, extreme restlessness, nausea, vomiting, diarrhea, stomach cramps, feeling tired or depressed, irregular heartbeats, weak pulse, seizure, or slow breathing (breathing may stop). What should I avoid while taking phentermine? Avoid driving or hazardous activity until you know how this medicine will affect you. Your reactions could be impaired. Drinking alcohol with this medicine can cause side effects. What are the possible side effects of phentermine? Get emergency medical help if you have signs of an allergic reaction: hives; difficult breathing; swelling of your face, lips, tongue, or throat. Call your doctor at once if you have:  · feeling short of breath, even with mild exertion;  · chest pain, feeling like you might pass out;  · swelling in your ankles or feet;  · pounding heartbeats or fluttering in your chest;  · tremors, feeling restless, trouble sleeping;  · unusual changes in mood or behavior; or  · increased blood pressure --severe headache, blurred vision, pounding in your neck or ears, anxiety, nosebleed. Common side effects may include:  · itching;  · dizziness, headache;  · dry mouth, unpleasant taste;  · diarrhea, constipation, stomach pain; or  · increased or decreased interest in sex. This is not a complete list of side effects and others may occur. Call your doctor for medical advice about side effects. You may report side effects to FDA at 9-459-UJS-8954. What other drugs will affect phentermine?   Taking phentermine together with other diet medications such as fenfluramine (Phen-Fen) or dexfenfluramine (Redux) can cause a rare fatal lung disorder precautions, warnings, drug interactions, allergic reactions, or adverse effects. If you have questions about the drugs you are taking, check with your doctor, nurse or pharmacist.  Copyright 2300-5364 [address]. Version: 10.01. Revision date: 7/26/2018. Care instructions adapted under license by [facility] ([facility]). If you have questions about a medical condition or this instruction, always ask your healthcare professional. [place] [address] any warranty or liability for your use of this information.

## 2021-05-18 NOTE — PROGRESS NOTES
Visit Information    Have you changed or started any medications since your last visit including any over-the-counter medicines, vitamins, or herbal medicines? no   Have you stopped taking any of your medications? Is so, why? -  no  Are you having any side effects from any of your medications? - no    Have you seen any other physician or provider since your last visit? yes -    Have you had any other diagnostic tests since your last visit? yes -    Have you been seen in the emergency room and/or had an admission in a hospital since we last saw you?  no   Have you had your routine dental cleaning in the past 6 months?  no     Do you have an active MyChart account? If no, what is the barrier?   Yes    Patient Care Team:  JUSTIN Spencer CNP as PCP - General (Family Medicine)  JUSTIN Spencer CNP as PCP - [facility]  Justine Catalan MD as Obstetrician (Perinatology)    Medical History Review  Past Medical, Family, and Social History reviewed and does contribute to the patient presenting condition    Health Maintenance   Topic Date Due    COVID-19 Vaccine (1) Never done    Hepatitis B vaccine (1 of 3 - Risk 3-dose series) Never done    A1C test (Diabetic or Prediabetic)  04/26/2022    Lipid screen  04/26/2022    Potassium monitoring  04/26/2022    Creatinine monitoring  04/26/2022    DTaP/Tdap/Td vaccine (3 - Td) 06/18/2025    Cervical cancer screen  10/27/2025    Pneumococcal 0-64 years Vaccine (2 of 2) 08/30/2051    Flu vaccine  Completed    Hepatitis C screen  Completed    HIV screen  Completed    Hepatitis A vaccine  Aged Out    Hib vaccine  Aged Out    Meningococcal (ACWY) vaccine  Aged Out    Varicella vaccine  Discontinued

## 2021-06-16 DIAGNOSIS — I10 ESSENTIAL HYPERTENSION: ICD-10-CM

## 2021-06-16 RX ORDER — LISINOPRIL 10 MG/1
20 TABLET ORAL DAILY
Qty: 60 TABLET | Refills: 1 | Status: SHIPPED | OUTPATIENT
Start: 2021-06-16 | End: 2021-07-19 | Stop reason: ALTCHOICE

## 2021-07-18 ASSESSMENT — ENCOUNTER SYMPTOMS
SHORTNESS OF BREATH: 0
RESPIRATORY NEGATIVE: 1
ABDOMINAL PAIN: 0

## 2021-07-18 NOTE — PROGRESS NOTES
[facility] & [facility] PHYSICIANS  [facility] [address] [facility] [address].  [address] [facility] [address] [address]  Dept: [address] [place] (:  1985) is a 30 y.o. female,Established patient, here for evaluation of the following chief complaint(s):  Chief Complaint   Patient presents with    Hypertension    Other     NO CONCERNS    Immunizations     NO TO COVID-19 VACCINE        SUBJECTIVE/OBJECTIVE:  MG Rodas is a 30 y.o. female patient. Patient is an established patient. She has a known history of prediabetes, obesity, and unintentional weight gain    EYE DISCHARGE  Patient had noticed some increasing lacrimation on both of her eyes and seems also itchy and some mild pain. Patient has been trying to not touch her eyes, have had some conjunctivitis in the past, similar presentation. No vision changes    GESTATIONAL DIABEtes/PREDIABETES  Patient has a  stable Diabetes Mellitus. Current therapy includes . Will start on Metformin. Patient have never have to use any insulin lately. Patient is responding well with this therapy. Patient reports home glucose monitoring as Stable readings. Patient denieshypoglycemia episodes such as{symptoms. Patient denies neither vomiting nor diarrhea. Lab Results   Component Value Date/Time    LABA1C 5.8 2021 10:45 AM    LABA1C 5.8 10/29/2020 08:13 AM      WANTS TO LOSE WEIGHT/INABILITY TO LOSE WEIGHT    Elgin Appiah 's BMI is Body mass index is 38.14 kg/m². kg/m2. Patient's BMI is elevated. Patient's main causes of weight gain are health problems, inactivity, stress and Work schedule. Patient have been trying to lose weight on her own without any success. So far, she  tried on type of dieting. 3- Strong desire present every day due to reducing carbohydrate consumption, increasing protein, eating regular meals, eliminating pop, reducing fast food consumption, increasing exercise, reducing sugar and using protein bars or shakes.  Discussed medication prescribed: Adipex. This condition increases the patient's risk for chronic conditions. Ayan Dumont wants to use an appetite suppressant. We discussed Adipex as a good option. Current weight 229lbs . Weight goal 200. Patient advised to practice healthy eating habits. Diet should include plenty of fruits, vegetables, whole grains, lean protein, and low-fat dairy. Increase water consumption. Reduce consumption of dietary sugar and sugar-sweetened beverages. Increasing physical exercises at least 3-4 times a week. In addition to the medication, patient also using diet and exercise as follows:  -diet: We'll start with walking  -exercise: Talked about macros    Since Adipex is a controlled substance. Patient informed that when prescribed this medication for weight loss, the provider is required by law to see the patient for an appointment every thirty days. This is neccessary to record the weight and blood pressure and to assess patient's efforts to lose weight, and to ensure there are no contraindications or adverse effects. Discussed contraindications to controlled substance anorexiant: NONE ( EtOH, drug abuse, pregnancy, BMI < 27 without co morbidities, if patient did not make substantial effort to lose weight)    Patient is going to continue with this medication for the next 4 weeks. BP Readings from Last 3 Encounters:   07/19/21 126/84   05/18/21 132/82   05/13/21 121/80      Pulse Readings from Last 3 Encounters:   07/19/21 80   05/18/21 82   05/13/21 72     Wt Readings from Last 3 Encounters:   07/19/21 229 lb 3.2 oz (104 kg)   05/18/21 237 lb 9.6 oz (107.8 kg)   05/13/21 230 lb (104.3 kg)     No flowsheet data found. Vitals:    07/19/21 1517   BP: 126/84   Pulse: 80   Temp: 97.5 °F (36.4 °C)   SpO2: 99%   Weight: 229 lb 3.2 oz (104 kg)   Height: 5' 5\" (1.651 m)   Estimated body mass index is 38.14 kg/m² as calculated from the following:    Height as of this encounter: 5' 5\" (1.651 m).     Weight as of this encounter: 229 lb 3.2 oz (104 kg). Review of Systems   Constitutional: Positive for unexpected weight change (unable to lose weightrt). Negative for chills and fever. HENT: Negative. Eyes: Positive for pain, discharge, redness and itching. Respiratory: Negative. Negative for shortness of breath. Cardiovascular: Negative. Negative for chest pain and palpitations. Gastrointestinal: Negative for abdominal pain. Endocrine: Negative. Genitourinary: Negative for dysuria. Musculoskeletal: Negative for arthralgias and myalgias. Skin: Negative for rash. Neurological: Negative for headaches. Psychiatric/Behavioral: Negative for sleep disturbance and suicidal ideas. The patient is nervous/anxious. Physical Exam  Vitals and nursing note reviewed. Constitutional:       Appearance: She is well-developed. She is obese. HENT:      Head: Normocephalic. Right Ear: External ear normal.      Left Ear: External ear normal.      Nose: Nose normal.      Mouth/Throat:      Mouth: Mucous membranes are moist.   Eyes:      General:         Right eye: Discharge present. Left eye: Discharge present. Conjunctiva/sclera:      Right eye: Right conjunctiva is injected. Left eye: Left conjunctiva is injected. Pupils: Pupils are equal, round, and reactive to light. Cardiovascular:      Rate and Rhythm: Normal rate and regular rhythm. Pulses: Normal pulses. Heart sounds: Normal heart sounds. Pulmonary:      Effort: Pulmonary effort is normal. No respiratory distress. Breath sounds: Normal breath sounds. Abdominal:      General: Abdomen is protuberant. Bowel sounds are normal. There is no distension. Palpations: Abdomen is soft. Tenderness: There is no abdominal tenderness. Comments: obese   Musculoskeletal:         General: Normal range of motion. Cervical back: Normal range of motion and neck supple.    Skin:     General: Skin is warm and dry. Capillary Refill: Capillary refill takes less than 2 seconds. Neurological:      Mental Status: She is alert and oriented to person, place, and time. Psychiatric:         Mood and Affect: Mood is elated. Speech: Speech is not rapid and pressured. Behavior: Behavior normal.         Thought Content: Thought content does not include homicidal or suicidal ideation. Diagnosis Date    Abnormal Pap smear     LSIL    Blood type A+     Bone disease     fibrousdysplasia    Depression 3/30/2021    Gestational diabetes     2 out of 3 preg== 1st-diet control----2nd-insulin/diet    Liver disease     Menarche @12y/o    Parity       --  vag del -- ALVERTO 9/10/2013    Postpartum depression 2011    Dx pp depression, put on Prozac    PreE w/o SF's (G7) 3/31/2021    Venous insufficiency of right leg       Prior to Visit Medications    Medication Sig Taking? Authorizing Provider   metFORMIN (GLUCOPHAGE) 500 MG tablet Take 1 tablet by mouth 2 times daily (with meals) Take 1 tablet daily for the first 7 days. Then BID Yes JUSTIN Urena CNP   phentermine 15 MG capsule Take 1 capsule by mouth every morning for 30 days. Yes JUSTIN Urena CNP   trimethoprim-polymyxin b (POLYTRIM) 46292-1.1 UNIT/ML-% ophthalmic solution Place 1 drop into the left eye every 4 hours for 10 days Yes JUSTIN Urena CNP   vitamin D (CHOLECALCIFEROL) 25 MCG (1000 UT) TABS tablet Take 1 tablet by mouth daily Yes JUSTIN Urena CNP   lisinopril (PRINIVIL;ZESTRIL) 20 MG tablet Take 1 tablet by mouth 2 times daily Yes JUSTIN Urena CNP   pravastatin (PRAVACHOL) 20 MG tablet Take 1 tablet by mouth daily Yes JUSTIN Urena CNP   Blood Pressure Monitor KIT Use as directed.  Yes JUSTIN Urena CNP   docusate sodium (COLACE) 100 MG capsule Take 1 capsule by mouth 2 times daily as needed for Constipation Yes Mikala Parham,  acetaminophen (TYLENOL) 500 MG tablet Take 2 tablets by mouth 3 times daily Yes Kevin Grimm APRN - CNP   Prenat w/o G-IA-Hoongvy-FA-DHA (ZATEAN-PN DHA) 27-0.6-0.4-300 MG CAPS  Yes Historical Provider, MD MCCARTY Alcohol Swabs 70 % PADS USE AS DIRECTED four times a day Yes Historical Provider, MD   lisinopril (PRINIVIL;ZESTRIL) 10 MG tablet Take 2 tablets by mouth daily  JUSTIN Urena - CNP   ibuprofen (ADVIL;MOTRIN) 600 MG tablet Take 1 tablet by mouth every 6 hours as needed for Pain  Maria Fernanda Ruiz,        ASSESSMENT/PLAN:  1. Acute conjunctivitis of both eyes, unspecified acute conjunctivitis type  Worsening  Start eye drops  Continue to monitor    - trimethoprim-polymyxin b (POLYTRIM) 95160-9.1 UNIT/ML-% ophthalmic solution; Place 1 drop into the left eye every 4 hours for 10 days  Dispense: 1 Bottle; Refill: 0    2. Prediabetes  Stable  DISCUSSED and ADVISED TO:  Decrease carbohydrates, sugary drinks, desserts   Exercise regularly, as tolerated. Try to lose weight. - metFORMIN (GLUCOPHAGE) 500 MG tablet; Take 1 tablet by mouth 2 times daily (with meals) Take 1 tablet daily for the first 7 days. Then BID  Dispense: 60 tablet; Refill: 3    3. Encounter for weight loss counseling  Failure to Improve  Start weight management    4. Severe obesity with body mass index (BMI) of 35.0 to 35.9 and comorbidity (HCC)  BMI increasing  DISCUSSED AND ADVISED TO:  Eat a low-fat and low carbohydrates diet. Avoid fried foods especially fast food. Choose healthier options for snacks. Have 5-6 servings of fruits and vegetables per day. Cut down on eating processed food. Add 30 minutes to 1 hour aerobic exercise for 3-4 days a week. - phentermine 15 MG capsule; Take 1 capsule by mouth every morning for 30 days. Dispense: 30 capsule; Refill: 0    5. Unintentional weight change  Worsening  Start adipex  Continue to monitor    - phentermine 15 MG capsule; Take 1 capsule by mouth every morning for 30 days. Dispense: 30 capsule; Refill: 0          Return in about 4 weeks (around 8/16/2021) for Pls ensure BP & Wt ready for Adipex. On this date 7/19/2021 I have spent 30 minutes reviewing previous notes, test results and face to face with the patient discussing the diagnosis and importance of compliance with the treatment plan as well as documenting on the day of the visit. This note was completed by using the assistance of a speech-recognition program. However, inadvertent computerized transcription errors may be present. Although every effort was made to ensure accuracy, no guarantees can be provided that every mistake has been identified and corrected by editing.   Electronically signed by JUSTIN Johnson CNP on 4/19/21 at 9:56 PM EDT     --JUSTIN Johnson CNP

## 2021-07-19 ENCOUNTER — OFFICE VISIT (OUTPATIENT)
Dept: FAMILY MEDICINE CLINIC | Age: 36
End: 2021-07-19
Payer: COMMERCIAL

## 2021-07-19 VITALS
OXYGEN SATURATION: 99 % | TEMPERATURE: 97.5 F | WEIGHT: 229.2 LBS | BODY MASS INDEX: 38.19 KG/M2 | DIASTOLIC BLOOD PRESSURE: 84 MMHG | HEIGHT: 65 IN | HEART RATE: 80 BPM | SYSTOLIC BLOOD PRESSURE: 126 MMHG

## 2021-07-19 DIAGNOSIS — H10.33 ACUTE CONJUNCTIVITIS OF BOTH EYES, UNSPECIFIED ACUTE CONJUNCTIVITIS TYPE: Primary | ICD-10-CM

## 2021-07-19 DIAGNOSIS — R73.03 PREDIABETES: ICD-10-CM

## 2021-07-19 DIAGNOSIS — R68.89 UNINTENTIONAL WEIGHT CHANGE: ICD-10-CM

## 2021-07-19 DIAGNOSIS — Z71.3 ENCOUNTER FOR WEIGHT LOSS COUNSELING: ICD-10-CM

## 2021-07-19 DIAGNOSIS — E66.01 SEVERE OBESITY WITH BODY MASS INDEX (BMI) OF 35.0 TO 35.9 AND COMORBIDITY (HCC): ICD-10-CM

## 2021-07-19 PROCEDURE — G8417 CALC BMI ABV UP PARAM F/U: HCPCS | Performed by: FAMILY MEDICINE

## 2021-07-19 PROCEDURE — 4004F PT TOBACCO SCREEN RCVD TLK: CPT | Performed by: FAMILY MEDICINE

## 2021-07-19 PROCEDURE — 99214 OFFICE O/P EST MOD 30 MIN: CPT | Performed by: FAMILY MEDICINE

## 2021-07-19 PROCEDURE — G8427 DOCREV CUR MEDS BY ELIG CLIN: HCPCS | Performed by: FAMILY MEDICINE

## 2021-07-19 RX ORDER — POLYMYXIN B SULFATE AND TRIMETHOPRIM 1; 10000 MG/ML; [USP'U]/ML
1 SOLUTION OPHTHALMIC EVERY 4 HOURS
Qty: 1 BOTTLE | Refills: 0 | Status: SHIPPED | OUTPATIENT
Start: 2021-07-19 | End: 2021-07-29

## 2021-07-19 RX ORDER — PHENTERMINE HYDROCHLORIDE 15 MG/1
15 CAPSULE ORAL EVERY MORNING
Qty: 30 CAPSULE | Refills: 0 | Status: SHIPPED | OUTPATIENT
Start: 2021-07-19 | End: 2021-08-13 | Stop reason: SDUPTHER

## 2021-07-19 SDOH — ECONOMIC STABILITY: FOOD INSECURITY: WITHIN THE PAST 12 MONTHS, THE FOOD YOU BOUGHT JUST DIDN'T LAST AND YOU DIDN'T HAVE MONEY TO GET MORE.: NEVER TRUE

## 2021-07-19 SDOH — ECONOMIC STABILITY: FOOD INSECURITY: WITHIN THE PAST 12 MONTHS, YOU WORRIED THAT YOUR FOOD WOULD RUN OUT BEFORE YOU GOT MONEY TO BUY MORE.: NEVER TRUE

## 2021-07-19 ASSESSMENT — PATIENT HEALTH QUESTIONNAIRE - PHQ9
1. LITTLE INTEREST OR PLEASURE IN DOING THINGS: 0
2. FEELING DOWN, DEPRESSED OR HOPELESS: 0
SUM OF ALL RESPONSES TO PHQ9 QUESTIONS 1 & 2: 0
SUM OF ALL RESPONSES TO PHQ QUESTIONS 1-9: 0

## 2021-07-19 ASSESSMENT — SOCIAL DETERMINANTS OF HEALTH (SDOH): HOW HARD IS IT FOR YOU TO PAY FOR THE VERY BASICS LIKE FOOD, HOUSING, MEDICAL CARE, AND HEATING?: NOT HARD AT ALL

## 2021-07-19 ASSESSMENT — ENCOUNTER SYMPTOMS
EYE DISCHARGE: 1
EYE PAIN: 1
EYE ITCHING: 1
EYE REDNESS: 1

## 2021-07-19 NOTE — PROGRESS NOTES
Visit Information    Have you changed or started any medications since your last visit including any over-the-counter medicines, vitamins, or herbal medicines? no   Have you stopped taking any of your medications? Is so, why? -  no  Are you having any side effects from any of your medications? - no    Have you seen any other physician or provider since your last visit?  no   Have you had any other diagnostic tests since your last visit?  no   Have you been seen in the emergency room and/or had an admission in a hospital since we last saw you?  no   Have you had your routine dental cleaning in the past 6 months?  no     Do you have an active MyChart account? If no, what is the barrier?   Yes    Patient Care Team:  JUSTIN Kong CNP as PCP - General (Family Medicine)  JUSTIN Kong C[facility]  Isabela Schwartz MD as Obstetrician (Perinatology)    Medical History Review  Past Medical, Family, and Social History reviewed and does contribute to the patient presenting condition    Health Maintenance   Topic Date Due    COVID-19 Vaccine (1) Never done    Hepatitis B vaccine (1 of 3 - Risk 3-dose series) Never done    Flu vaccine (1) 09/01/2021    A1C test (Diabetic or Prediabetic)  04/26/2022    Lipid screen  04/26/2022    Potassium monitoring  04/26/2022    Creatinine monitoring  04/26/2022    DTaP/Tdap/Td vaccine (3 - Td or Tdap) 06/18/2025    Cervical cancer screen  10/27/2025    Pneumococcal 0-64 years Vaccine (2 of 2 - PPSV23) 08/30/2051    Hepatitis C screen  Completed    HIV screen  Completed    Hepatitis A vaccine  Aged Out    Hib vaccine  Aged Out    Meningococcal (ACWY) vaccine  Aged Out    Varicella vaccine  Discontinued

## 2021-07-19 NOTE — PATIENT INSTRUCTIONS
Patient Education        phentermine  Pronunciation:  RHETT marry ShankarEMCAS  Brand: Adipdaniella-PShubhammaykel  What is the most important information I should know about phentermine? You should not use this medicine if you have glaucoma, overactive thyroid, severe heart problems, uncontrolled high blood pressure, advanced coronary artery disease, extreme agitation, or a history of drug abuse. Do not use this medicine if you have used an MAO inhibitor in the past 14 days, such as isocarboxazid, linezolid, methylene blue injection, phenelzine, rasagiline, selegiline, or tranylcypromine. What is phentermine? Phentermine is similar to an amphetamine. Phentermine stimulates the central nervous system (nerves and brain), which increases your heart rate and blood pressure and decreases your appetite. Phentermine is used together with diet and exercise to treat obesity, especially in people with risk factors such as high blood pressure, high cholesterol, or diabetes. Phentermine may also be used for purposes not listed in this medication guide. What should I discuss with my healthcare provider before taking phentermine? You should not use phentermine if you are allergic to it, or if you have:  · a history of heart disease (coronary artery disease, heart rhythm problems, congestive heart failure, stroke);  · severe or uncontrolled high blood pressure;  · overactive thyroid;  · glaucoma;  · extreme agitation or nervousness;  · a history of drug abuse; or  · if you take other diet pills. Do not use phentermine if you have used an MAO inhibitor in the past 14 days. A dangerous drug interaction could occur. MAO inhibitors include isocarboxazid, linezolid, methylene blue injection, phenelzine, rasagiline, selegiline, tranylcypromine, and others. Weight loss during pregnancy can harm an unborn baby, even if you are overweight. Do not use phentermine if you are pregnant.  Tell your doctor right away if you become pregnant during treatment. You should not breast-feed while using phentermine. Tell your doctor if you have ever had:  · heart disease or coronary artery disease;  · a heart valve disorder;  · high blood pressure;  · diabetes (your diabetes medication dose may need to be adjusted); or  · kidney disease. Phentermine is not approved for use by anyone younger than 12years old. How should I take phentermine? Follow all directions on your prescription label and read all medication guides or instruction sheets. Your doctor may occasionally change your dose. Use the medicine exactly as directed. Phentermine is usually taken before breakfast, or 1 to 2 hours after breakfast. Follow your doctor's dosing instructions very carefully. Never use phentermine in larger amounts, or for longer than prescribed. Taking more of this medication will not make it more effective and can cause serious, life-threatening side effects. Phentermine is for short-term use only. The effects of appetite suppression may wear off after a few weeks. Phentermine may be habit-forming. Misuse can cause addiction, overdose, or death. Selling or giving away this medicine is against the law. Call your doctor at once if you think this medicine is not working as well, or if you have not lost at least 4 pounds within 4 weeks. Do not stop using phentermine suddenly, or you could have unpleasant withdrawal symptoms. Ask your doctor how to safely stop using this medicine. Store at room temperature away from moisture and heat. Keep the bottle tightly closed when not in use. What happens if I miss a dose? Take the medicine as soon as you can, but skip the missed dose if it is late in the day. Do not  take two doses at one time. What happens if I overdose? Seek emergency medical attention or call the Poison Help line at 1-181.327.4034.  An overdose of phentermine can be fatal.  Overdose symptoms may include confusion, panic, hallucinations, extreme restlessness, nausea, vomiting, diarrhea, stomach cramps, feeling tired or depressed, irregular heartbeats, weak pulse, seizure, or slow breathing (breathing may stop). What should I avoid while taking phentermine? Avoid driving or hazardous activity until you know how this medicine will affect you. Your reactions could be impaired. Drinking alcohol with this medicine can cause side effects. What are the possible side effects of phentermine? Get emergency medical help if you have signs of an allergic reaction: hives; difficult breathing; swelling of your face, lips, tongue, or throat. Call your doctor at once if you have:  · feeling short of breath, even with mild exertion;  · chest pain, feeling like you might pass out;  · swelling in your ankles or feet;  · pounding heartbeats or fluttering in your chest;  · tremors, feeling restless, trouble sleeping;  · unusual changes in mood or behavior; or  · increased blood pressure --severe headache, blurred vision, pounding in your neck or ears, anxiety, nosebleed. Common side effects may include:  · itching;  · dizziness, headache;  · dry mouth, unpleasant taste;  · diarrhea, constipation, stomach pain; or  · increased or decreased interest in sex. This is not a complete list of side effects and others may occur. Call your doctor for medical advice about side effects. You may report side effects to FDA at 7-470-FDA-3856. What other drugs will affect phentermine? Taking phentermine together with other diet medications such as fenfluramine (Phen-Fen) or dexfenfluramine (Redux) can cause a rare fatal lung disorder called pulmonary hypertension. Do not take phentermine with any other diet medications without your doctor's advice. Many drugs can affect phentermine. This includes prescription and over-the-counter medicines, vitamins, and herbal products. Not all possible interactions are listed here.  Tell your doctor about all your current medicines and any medicine you start or stop using. Where can I get more information? Your pharmacist can provide more information about phentermine. Remember, keep this and all other medicines out of the reach of children, never share your medicines with others, and use this medication only for the indication prescribed. Every effort has been made to ensure that the information provided by [address] is accurate, up-to-date, and complete, but no guarantee is made to that effect. Drug information contained herein may be time sensitive. [facility] information has been compiled for use by healthcare practitioners and consumers in the [place] and therefore [facility] does not warrant that uses outside of the [place] are appropriate, unless specifically indicated otherwise. [facility]'s drug information does not endorse drugs, diagnose patients or recommend therapy. [facility]'s drug information is an informational resource designed to assist licensed healthcare practitioners in caring for their patients and/or to serve consumers viewing this service as a supplement to, and not a substitute for, the expertise, skill, knowledge and judgment of healthcare practitioners. The absence of a warning for a given drug or drug combination in no way should be construed to indicate that the drug or drug combination is safe, effective or appropriate for any given patient. [facility] does not assume any responsibility for any aspect of healthcare administered with the aid of information [facility] provides. The information contained herein is not intended to cover all possible uses, directions, precautions, warnings, drug interactions, allergic reactions, or adverse effects. If you have questions about the drugs you are taking, check with your doctor, nurse or pharmacist.  Copyright 7448-1442 [address]. Version: 10.01. Revision date: 7/26/2018. Care instructions adapted under license by [facility] ([facility]).  If you have questions about a medical condition or this instruction, always ask your healthcare professional. [place] [address] any warranty or liability for your use of this information.

## 2021-08-13 ASSESSMENT — ENCOUNTER SYMPTOMS
ABDOMINAL PAIN: 0
RESPIRATORY NEGATIVE: 1
EYE REDNESS: 1
SHORTNESS OF BREATH: 0

## 2021-08-13 NOTE — PROGRESS NOTES
[facility] PHYSICIANS  [facility] PHYSICIANS [address] [facility].  [address] [address]  Dept: [address] (:  1985) is a 30 y.o. female,Established patient, here for evaluation of the following chief complaint(s):  Chief Complaint   Patient presents with    Weight Management    Swelling     crusty, swelling and itchy eyes         SUBJECTIVE/OBJECTIVE:  MG Pleitez is a 30 y.o. female patient. Patient is an established patient. She has a known history of prediabetes, obesity, and unintentional weight gain    EYE SWELLING  And have had some eye irritation for over a week now. Patient states that they were working on a drywall and may have had some insulation pieces on both eyes. Patient was provided with some neomycin polymyxin. Patient states that the redness had improved a little bit but still continues to bother her. She denies any vision issues she does not feel like there is any foreign body in her eyes. We will go ahead and send her some ketorolac to help with that. Patient is aware that if this is not better with ketorolac she might need to go see an eye doctor. She is really not able to check for foreign body in the office. GESTATIONAL DIABEtes/PREDIABETES  Patient has a  stable Diabetes Mellitus. Current therapy includes . Metformin. Patient have never have to use any insulin lately. Patient is responding well with this therapy. However, patient states that she has had some episodes where her glucose readings have been 70. She feels a little out of sorts but dose of the days when she does not eat any breakfast at all. Patient reports home glucose monitoring as Stable readings. Patient denieshypoglycemia episodes such as{symptoms. Patient denies neither vomiting nor diarrhea.    Lab Results   Component Value Date/Time    LABA1C 5.8 2021 10:45 AM    LABA1C 5.8 10/29/2020 08:13 AM      WANTS TO LOSE WEIGHT/INABILITY TO LOSE WEIGHT    Jac Walker 's BMI is Body mass index is 37.94 kg/m². kg/m2. Patient's BMI is elevated. Patient's main causes of weight gain are health problems, inactivity, stress and Work schedule. Patient have been trying to lose weight on her own without any success. So far, she  tried on type of dieting. 3- Strong desire present every day due to reducing carbohydrate consumption, increasing protein, eating regular meals, eliminating pop, reducing fast food consumption, increasing exercise, reducing sugar and using protein bars or shakes. Discussed medication prescribed: Adipex. This condition increases the patient's risk for chronic conditions. Jac Walker wants to use an appetite suppressant. We discussed Adipex as a good option. Original weight 229lbs . Weight goal 200. Current weight: 228    Patient did not do well for her first month. Despite eating fairly healthy cutting down on the carbs and trying to do the best she can to get some exercises done. Patient is unable to do a lot of exercise due to her chronic knee pain. Patient is being helped by her  as far as exercise go but is not able to do it on a routine basis. Patient is currently do a lot more walking into a little bit more exercise that she can tolerate. Patient advised to practice healthy eating habits. Diet should include plenty of fruits, vegetables, whole grains, lean protein, and low-fat dairy. Increase water consumption. Reduce consumption of dietary sugar and sugar-sweetened beverages. Increasing physical exercises at least 3-4 times a week. In addition to the medication, patient also using diet and exercise as follows:  -diet: We'll start with walking-- daily- walk, do more weights. -exercise: Talked about macros    Since Adipex is a controlled substance. Patient informed that when prescribed this medication for weight loss, the provider is required by law to see the patient for an appointment every thirty days.  This is neccessary to record the weight and blood pressure and to assess patient's efforts to lose weight, and to ensure there are no contraindications or adverse effects. Discussed contraindications to controlled substance anorexiant: NONE ( EtOH, drug abuse, pregnancy, BMI < 27 without co morbidities, if patient did not make substantial effort to lose weight)    Patient is going to continue with this medication for the next 4 weeks. BP Readings from Last 3 Encounters:   08/16/21 120/80   07/19/21 126/84   05/18/21 132/82      Pulse Readings from Last 3 Encounters:   08/16/21 71   07/19/21 80   05/18/21 82     Wt Readings from Last 3 Encounters:   08/16/21 228 lb (103.4 kg)   07/19/21 229 lb 3.2 oz (104 kg)   05/18/21 237 lb 9.6 oz (107.8 kg)     No flowsheet data found. Vitals:    08/16/21 1105   BP: 120/80   Pulse: 71   Temp: 97.3 °F (36.3 °C)   SpO2: 98%   Weight: 228 lb (103.4 kg)   Height: 5' 5\" (1.651 m)   Estimated body mass index is 37.94 kg/m² as calculated from the following:    Height as of this encounter: 5' 5\" (1.651 m). Weight as of this encounter: 228 lb (103.4 kg). Review of Systems   Constitutional: Positive for unexpected weight change (mild loss). Negative for chills and fever. HENT: Negative. Eyes: Positive for redness. Negative for pain and itching. Respiratory: Negative. Negative for shortness of breath. Cardiovascular: Negative. Negative for chest pain and palpitations. Gastrointestinal: Negative for abdominal pain. Endocrine: Negative. Genitourinary: Negative for dysuria. Musculoskeletal: Negative for arthralgias and myalgias. Skin: Negative for rash. Neurological: Negative for headaches. Psychiatric/Behavioral: Negative for sleep disturbance and suicidal ideas. The patient is nervous/anxious. Physical Exam  Vitals and nursing note reviewed. Constitutional:       Appearance: She is well-developed. She is obese. HENT:      Head: Normocephalic. Right Ear: External ear normal.      Left Ear: External ear normal.      Nose: Nose normal.      Mouth/Throat:      Mouth: Mucous membranes are moist.   Eyes:      General:         Right eye: Discharge present. Left eye: Discharge present. Conjunctiva/sclera:      Right eye: Right conjunctiva is injected. Left eye: Left conjunctiva is injected. Pupils: Pupils are equal, round, and reactive to light. Cardiovascular:      Rate and Rhythm: Normal rate and regular rhythm. Pulses: Normal pulses. Heart sounds: Normal heart sounds. Pulmonary:      Effort: Pulmonary effort is normal. No respiratory distress. Breath sounds: Normal breath sounds. Abdominal:      General: Abdomen is protuberant. Bowel sounds are normal. There is no distension. Palpations: Abdomen is soft. Tenderness: There is no abdominal tenderness. Comments: obese   Musculoskeletal:         General: Normal range of motion. Cervical back: Normal range of motion and neck supple. Skin:     General: Skin is warm and dry. Capillary Refill: Capillary refill takes less than 2 seconds. Neurological:      Mental Status: She is alert and oriented to person, place, and time. Psychiatric:         Mood and Affect: Mood is elated. Speech: Speech is not rapid and pressured. Behavior: Behavior normal.         Thought Content: Thought content does not include homicidal or suicidal ideation.              Diagnosis Date    Abnormal Pap smear     LSIL    Blood type A+     Bone disease     fibrousdysplasia    Depression 3/30/2021    Gestational diabetes     2 out of 3 preg== 1st-diet control----2nd-insulin/diet    Liver disease     Menarche @14y/o    Parity       --  vag del -- ALVERTO 9/10/2013    Postpartum depression 2011    Dx pp depression, put on Prozac    PreE w/o SF's (G7) 3/31/2021    Venous insufficiency of right leg       Prior to Visit Medications    Medication Sig Taking? Authorizing Provider   phentermine 30 MG capsule Take 1 capsule by mouth every morning for 30 days. Yes JUSTIN Urena CNP   ketorolac (ACULAR) 0.5 % ophthalmic solution Place 1 drop into both eyes 4 times daily for 7 days Yes JUSTIN Urena CNP   metFORMIN (GLUCOPHAGE) 500 MG tablet Take 1 tablet by mouth 2 times daily (with meals) Take 1 tablet daily for the first 7 days. Then BID Yes JUSTIN Urena CNP   vitamin D (CHOLECALCIFEROL) 25 MCG (1000 UT) TABS tablet Take 1 tablet by mouth daily Yes JUSTIN Urena CNP   lisinopril (PRINIVIL;ZESTRIL) 20 MG tablet Take 1 tablet by mouth 2 times daily Yes JUSTIN Urena CNP   pravastatin (PRAVACHOL) 20 MG tablet Take 1 tablet by mouth daily Yes JUSTIN Urena CNP   Blood Pressure Monitor KIT Use as directed. Yes JUSTIN Urena CNP   docusate sodium (COLACE) 100 MG capsule Take 1 capsule by mouth 2 times daily as needed for Constipation Yes Marleni Avery DO   acetaminophen (TYLENOL) 500 MG tablet Take 2 tablets by mouth 3 times daily Yes JUSTIN Kaur CNP   Prenat w/o V-IP-Tvikvlg-FA-DHA (ZATEAN-PN DHA) 27-0.6-0.4-300 MG CAPS  Yes Historical Provider, MD MCCARTY Alcohol Swabs 70 % PADS USE AS DIRECTED four times a day Yes Historical Provider, MD   lisinopril (PRINIVIL;ZESTRIL) 10 MG tablet Take 2 tablets by mouth daily  JUSTIN Urena CNP   ibuprofen (ADVIL;MOTRIN) 600 MG tablet Take 1 tablet by mouth every 6 hours as needed for Pain  Marleni Avery DO       ASSESSMENT/PLAN:  1. Irritation of both eyes  Failure to Improve   use ketorolac  Eye consult if not better  - ketorolac (ACULAR) 0.5 % ophthalmic solution; Place 1 drop into both eyes 4 times daily for 7 days  Dispense: 1 Bottle; Refill: 0    2. Prediabetes  Stable  DISCUSSED and ADVISED TO:  Decrease carbohydrates, sugary drinks, desserts   Exercise regularly, as tolerated. Try to lose weight.       3. Encounter for weight loss counseling  Failure to Improve  Continue adipex    - phentermine 30 MG capsule; Take 1 capsule by mouth every morning for 30 days. Dispense: 30 capsule; Refill: 0    4. Severe obesity (BMI 35.0-39. 9) with comorbidity ([address]  BMI decreasing  DISCUSSED AND ADVISED TO:  Eat a low-fat and low carbohydrates diet. Avoid fried foods especially fast food. Choose healthier options for snacks. Have 5-6 servings of fruits and vegetables per day. Cut down on eating processed food. Add 30 minutes to 1 hour aerobic exercise for 3-4 days a week. - phentermine 30 MG capsule; Take 1 capsule by mouth every morning for 30 days. Dispense: 30 capsule; Refill: 0    5. Unintentional weight change  Improving  Not much weight loss  Increasing muscle mass?    - phentermine 30 MG capsule; Take 1 capsule by mouth every morning for 30 days. Dispense: 30 capsule; Refill: 0      On this date 8/16/2021 I have spent 30 minutes reviewing previous notes, test results and face to face with the patient discussing the diagnosis and importance of compliance with the treatment plan as well as documenting on the day of the visit. Return in about 4 weeks (around 9/13/2021) for Pls ensure BP & Wt ready for Adipex. This note was completed by using the assistance of a speech-recognition program. However, inadvertent computerized transcription errors may be present. Although every effort was made to ensure accuracy, no guarantees can be provided that every mistake has been identified and corrected by editing.   Electronically signed by JUSTIN Santos CNP on 4/19/21 at 9:56 PM EDT     --JUSTIN Santos CNP

## 2021-08-16 ENCOUNTER — OFFICE VISIT (OUTPATIENT)
Dept: FAMILY MEDICINE CLINIC | Age: 36
End: 2021-08-16
Payer: COMMERCIAL

## 2021-08-16 VITALS
HEIGHT: 65 IN | OXYGEN SATURATION: 98 % | BODY MASS INDEX: 37.99 KG/M2 | DIASTOLIC BLOOD PRESSURE: 80 MMHG | TEMPERATURE: 97.3 F | HEART RATE: 71 BPM | SYSTOLIC BLOOD PRESSURE: 120 MMHG | WEIGHT: 228 LBS

## 2021-08-16 DIAGNOSIS — H57.89 IRRITATION OF BOTH EYES: Primary | ICD-10-CM

## 2021-08-16 DIAGNOSIS — R73.03 PREDIABETES: ICD-10-CM

## 2021-08-16 DIAGNOSIS — Z71.3 ENCOUNTER FOR WEIGHT LOSS COUNSELING: ICD-10-CM

## 2021-08-16 DIAGNOSIS — R68.89 UNINTENTIONAL WEIGHT CHANGE: ICD-10-CM

## 2021-08-16 DIAGNOSIS — E66.01 SEVERE OBESITY (BMI 35.0-39.9) WITH COMORBIDITY (HCC): ICD-10-CM

## 2021-08-16 PROCEDURE — G8427 DOCREV CUR MEDS BY ELIG CLIN: HCPCS | Performed by: FAMILY MEDICINE

## 2021-08-16 PROCEDURE — G8417 CALC BMI ABV UP PARAM F/U: HCPCS | Performed by: FAMILY MEDICINE

## 2021-08-16 PROCEDURE — 4004F PT TOBACCO SCREEN RCVD TLK: CPT | Performed by: FAMILY MEDICINE

## 2021-08-16 PROCEDURE — 99214 OFFICE O/P EST MOD 30 MIN: CPT | Performed by: FAMILY MEDICINE

## 2021-08-16 RX ORDER — PHENTERMINE HYDROCHLORIDE 30 MG/1
30 CAPSULE ORAL EVERY MORNING
Qty: 30 CAPSULE | Refills: 0 | Status: SHIPPED | OUTPATIENT
Start: 2021-08-16 | End: 2021-08-31

## 2021-08-16 RX ORDER — KETOROLAC TROMETHAMINE 5 MG/ML
1 SOLUTION OPHTHALMIC 4 TIMES DAILY
Qty: 1 BOTTLE | Refills: 0 | Status: SHIPPED | OUTPATIENT
Start: 2021-08-16 | End: 2021-08-23

## 2021-08-16 ASSESSMENT — ENCOUNTER SYMPTOMS
EYE ITCHING: 0
EYE PAIN: 0

## 2021-08-16 NOTE — PATIENT INSTRUCTIONS
Patient Education        Learning About the Mediterranean Diet  What is the [address]? The Mediterranean diet is a style of eating rather than a diet plan. It features foods eaten in [place], [place], [place] and [place], and other countries along the [facility]. It emphasizes eating foods like fish, fruits, vegetables, beans, high-fiber breads and whole grains, nuts, and olive oil. This style of eating includes limited red meat, cheese, and sweets. Why choose the Mediterranean diet? A Mediterranean-style diet may improve heart health. It contains more fat than other heart-healthy diets. But the fats are mainly from nuts, unsaturated oils (such as fish oils and olive oil), and certain nut or seed oils (such as canola, soybean, or flaxseed oil). These fats may help protect the heart and blood vessels. How can you get started on the Mediterranean diet? Here are some things you can do to switch to a more Mediterranean way of eating. What to eat  · Eat a variety of fruits and vegetables each day, such as grapes, blueberries, tomatoes, broccoli, peppers, figs, olives, spinach, eggplant, beans, lentils, and chickpeas. · Eat a variety of whole-grain foods each day, such as oats, brown rice, and whole wheat bread, pasta, and couscous. · Eat fish at least 2 times a week. Try tuna, salmon, mackerel, lake trout, herring, or sardines. · Eat moderate amounts of low-fat dairy products, such as milk, cheese, or yogurt. · Eat moderate amounts of poultry and eggs. · Choose healthy (unsaturated) fats, such as nuts, olive oil, and certain nut or seed oils like canola, soybean, and flaxseed. · Limit unhealthy (saturated) fats, such as butter, palm oil, and coconut oil. And limit fats found in animal products, such as meat and dairy products made with whole milk. Try to eat red meat only a few times a month in very small amounts. · Limit sweets and desserts to only a few times a week.  This includes questions about a medical condition or this instruction, always ask your healthcare professional. [address] any warranty or liability for your use of this information. Patient Education        Eating Healthy Foods: Care Instructions  Your Care Instructions     Eating healthy foods can help lower your risk for disease. Healthy food gives you energy and keeps your heart strong, your brain active, your muscles working, and your bones strong. A healthy diet includes a variety of foods from the basic food groups: grains, vegetables, fruits, milk and milk products, and meat and beans. Some people may eat more of their favorite foods from only one food group and, as a result, miss getting the nutrients they need. So, it is important to pay attention not only to what you eat but also to what you are missing from your diet. You can eat a healthy, balanced diet by making a few small changes. Follow-up care is a key part of your treatment and safety. Be sure to make and go to all appointments, and call your doctor if you are having problems. It's also a good idea to know your test results and keep a list of the medicines you take. How can you care for yourself at home? Look at what you eat  · Keep a food diary for a week or two and record everything you eat or drink. Track the number of servings you eat from each food group. · For a balanced diet every day, eat a variety of:  ? 6 or more ounce-equivalents of grains, such as cereals, breads, crackers, rice, or pasta, every day. An ounce-equivalent is 1 slice of bread, 1 cup of ready-to-eat cereal, or ½ cup of cooked rice, cooked pasta, or cooked cereal.  ? 2½ cups of vegetables, especially:  § Dark-green vegetables such as broccoli and spinach. § Orange vegetables such as carrots and sweet potatoes. § Dry beans (such as aguayo and kidney beans) and peas (such as lentils). ? 2 cups of fresh, frozen, or canned fruit.  A small apple or 1 banana or orange equals 1 cup. ? 3 cups of nonfat or low-fat milk, yogurt, or other milk products. ? 5½ ounces of meat and beans, such as chicken, fish, lean meat, beans, nuts, and seeds. One egg, 1 tablespoon of peanut butter, ½ ounce nuts or seeds, or ¼ cup of cooked beans equals 1 ounce of meat. · Learn how to read food labels for serving sizes and ingredients. Fast-food and convenience-food meals often contain few or no fruits or vegetables. Make sure you eat some fruits and vegetables to make the meal more nutritious. · Look at your food diary. For each food group, add up what you have eaten and then divide the total by the number of days. This will give you an idea of how much you are eating from each food group. See if you can find some ways to change your diet to make it more healthy. Start small  · Do not try to make dramatic changes to your diet all at once. You might feel that you are missing out on your favorite foods and then be more likely to fail. · Start slowly, and gradually change your habits. Try some of the following:  ? Use whole wheat bread instead of white bread. ? Use nonfat or low-fat milk instead of whole milk. ? Eat brown rice instead of white rice, and eat whole wheat pasta instead of white-flour pasta. ? Try low-fat cheeses and low-fat yogurt. ? Add more fruits and vegetables to meals and have them for snacks. ? Add lettuce, tomato, cucumber, and onion to sandwiches. ? Add fruit to yogurt and cereal.  Enjoy food  · You can still eat your favorite foods. You just may need to eat less of them. If your favorite foods are high in fat, salt, and sugar, limit how often you eat them, but do not cut them out entirely. · Eat a wide variety of foods. Make healthy choices when eating out  · The type of restaurant you choose can help you make healthy choices. Even fast-food chains are now offering more low-fat or healthier choices on the menu.   · Choose smaller portions, or take half of your meal home. · When eating out, try:  ? A veggie pizza with a whole wheat crust or grilled chicken (instead of sausage or pepperoni). ? Pasta with roasted vegetables, grilled chicken, or marinara sauce instead of cream sauce. ? A vegetable wrap or grilled chicken wrap. ? Broiled or poached food instead of fried or breaded items. Make healthy choices easy  · Buy packaged, prewashed, ready-to-eat fresh vegetables and fruits, such as baby carrots, salad mixes, and chopped or shredded broccoli and cauliflower. · Buy packaged, presliced fruits, such as melon or pineapple. · Choose 100% fruit or vegetable juice instead of soda. Limit juice intake to 4 to 6 oz (½ to ¾ cup) a day. · Blend low-fat yogurt, fruit juice, and canned or frozen fruit to make a smoothie for breakfast or a snack. Where can you learn more? Go to https://[employer].Rubik[employer]oud. org and sign in to your [employer] account. Enter S349 in the [facility] box to learn more about \"Eating Healthy Foods: Care Instructions. \"     If you do not have an account, please click on the \"Sign Up Now\" link. Current as of: December 17, 2020               Content Version: 12.9  © 2451-9972 [employer]wise, [employer]. Care instructions adapted under license by [facility] ([facility]). If you have questions about a medical condition or this instruction, always ask your healthcare professional. [place] [address] any warranty or liability for your use of this information.

## 2021-08-31 ENCOUNTER — OFFICE VISIT (OUTPATIENT)
Dept: FAMILY MEDICINE CLINIC | Age: 36
End: 2021-08-31
Payer: COMMERCIAL

## 2021-08-31 VITALS
OXYGEN SATURATION: 98 % | DIASTOLIC BLOOD PRESSURE: 72 MMHG | TEMPERATURE: 98.1 F | BODY MASS INDEX: 36.49 KG/M2 | HEART RATE: 68 BPM | HEIGHT: 65 IN | SYSTOLIC BLOOD PRESSURE: 118 MMHG | WEIGHT: 219 LBS

## 2021-08-31 DIAGNOSIS — Z71.3 ENCOUNTER FOR WEIGHT LOSS COUNSELING: ICD-10-CM

## 2021-08-31 DIAGNOSIS — E66.01 SEVERE OBESITY (BMI 35.0-39.9) WITH COMORBIDITY (HCC): ICD-10-CM

## 2021-08-31 DIAGNOSIS — R73.03 PREDIABETES: ICD-10-CM

## 2021-08-31 DIAGNOSIS — M25.562 ACUTE PAIN OF LEFT KNEE: Primary | ICD-10-CM

## 2021-08-31 DIAGNOSIS — E78.2 MIXED HYPERLIPIDEMIA: ICD-10-CM

## 2021-08-31 LAB — HBA1C MFR BLD: 5.6 %

## 2021-08-31 PROCEDURE — G8427 DOCREV CUR MEDS BY ELIG CLIN: HCPCS | Performed by: FAMILY MEDICINE

## 2021-08-31 PROCEDURE — 99214 OFFICE O/P EST MOD 30 MIN: CPT | Performed by: FAMILY MEDICINE

## 2021-08-31 PROCEDURE — 83036 HEMOGLOBIN GLYCOSYLATED A1C: CPT | Performed by: FAMILY MEDICINE

## 2021-08-31 PROCEDURE — 4004F PT TOBACCO SCREEN RCVD TLK: CPT | Performed by: FAMILY MEDICINE

## 2021-08-31 PROCEDURE — G8417 CALC BMI ABV UP PARAM F/U: HCPCS | Performed by: FAMILY MEDICINE

## 2021-08-31 RX ORDER — PHENTERMINE HYDROCHLORIDE 37.5 MG/1
37.5 TABLET ORAL
Qty: 30 TABLET | Refills: 0 | Status: SHIPPED | OUTPATIENT
Start: 2021-08-31 | End: 2021-09-30

## 2021-08-31 RX ORDER — PRAVASTATIN SODIUM 20 MG
20 TABLET ORAL DAILY
Qty: 30 TABLET | Refills: 5 | Status: SHIPPED | OUTPATIENT
Start: 2021-08-31 | End: 2022-09-08 | Stop reason: SDUPTHER

## 2021-08-31 RX ORDER — MULTIVIT 47/IRON/FOLATE 1/DHA 27-1-300MG
1 CAPSULE ORAL DAILY
Qty: 90 CAPSULE | Refills: 2 | Status: SHIPPED | OUTPATIENT
Start: 2021-08-31

## 2021-08-31 ASSESSMENT — ENCOUNTER SYMPTOMS
ABDOMINAL PAIN: 0
RESPIRATORY NEGATIVE: 1
EYE PAIN: 0
SHORTNESS OF BREATH: 0
EYE ITCHING: 0

## 2021-08-31 NOTE — PROGRESS NOTES
[facility] & [facility] PHYSICIANS  [facility] FAM[facility] PHYSICIANS [address] [facility] [address].  [address] [address]  Dept: [address] (:  1985) is a 29 y.o. female,Established patient, here for evaluation of the following chief complaint(s):  Chief Complaint   Patient presents with    Knee Pain     left, started hurting when jogging may have twisted wrong        SUBJECTIVE/OBJECTIVE:  MG Lu is a 29 y.o. female patient. Patient is an established patient. She has a known history of prediabetes, obesity, and unintentional weight gain    LEFT KNEE PAIN  Patient have beeb walking daily to help lose weight. However, she twisted knee while walking. Patient had some pain with weight bearing on her medial part of the knee. PREDIABETES  Patient has a  stable Diabetes Mellitus. Current therapy includes Metformin but had some diarrhea. Will start Verdene Nicks. . Patient does not shcek glucose levels. . Patient denieshypoglycemia episodes such as{symptoms. Patient denies neither vomiting nor diarrhea. Lab Results   Component Value Date/Time    LABA1C 5.6 2021 02:58 PM    LABA1C 5.8 2021 10:45 AM      WANTS TO LOSE WEIGHT/INABILITY TO LOSE WEIGHT    Nitza Delgado 's BMI is Body mass index is 36.44 kg/m². kg/m2. Patient's BMI is elevated. Decreasing. Patient's main causes of weight gain are health problems, inactivity, stress and Work schedule. Patient have been trying to lose weight on her own without any success. So far, she  tried on type of dieting. 3- Strong desire present every day due to reducing carbohydrate consumption, increasing protein, eating regular meals, eliminating pop, reducing fast food consumption, increasing exercise, reducing sugar and using protein bars or shakes. Discussed medication prescribed: Adipex. This condition increases the patient's risk for chronic conditions. Nitza Delgado wants to use an appetite suppressant.  We discussed Adipex as a good option. Original weight 229lbs . Weight goal 200. Current weight: 219  Lost 10 lbs. Patient did better last month . Continue to eating fairly healthy cutting down on the carbs and trying to do the best she can to get some exercises done. Patient is unable to do a lot of exercise due to her chronic knee pain. Patient is being helped by her  as far as exercise go but is not able to do it on a routine basis. Patient is currently do a lot more walking into a little bit more exercise that she can tolerate. Patient advised to practice healthy eating habits. Diet should include plenty of fruits, vegetables, whole grains, lean protein, and low-fat dairy. Increase water consumption. Reduce consumption of dietary sugar and sugar-sweetened beverages. Increasing physical exercises at least 3-4 times a week. In addition to the medication, patient also using diet and exercise as follows:  -diet: We'll start with walking-- daily- walk, do more weights. -exercise: Talked about macros    Since Adipex is a controlled substance. Patient informed that when prescribed this medication for weight loss, the provider is required by law to see the patient for an appointment every thirty days. This is neccessary to record the weight and blood pressure and to assess patient's efforts to lose weight, and to ensure there are no contraindications or adverse effects. Discussed contraindications to controlled substance anorexiant: NONE ( EtOH, drug abuse, pregnancy, BMI < 27 without co morbidities, if patient did not make substantial effort to lose weight)    Patient is going to continue with this medication for the next 4 weeks.   BP Readings from Last 3 Encounters:   08/31/21 118/72   08/16/21 120/80   07/19/21 126/84      Pulse Readings from Last 3 Encounters:   08/31/21 68   08/16/21 71   07/19/21 80     Wt Readings from Last 3 Encounters:   08/31/21 219 lb (99.3 kg)   08/16/21 228 lb (103.4 kg)   07/19/21 229 lb 3.2 oz (104 kg)     No flowsheet data found. Vitals:    08/31/21 1443   BP: 118/72   Pulse: 68   Temp: 98.1 °F (36.7 °C)   TempSrc: Temporal   SpO2: 98%   Weight: 219 lb (99.3 kg)   Height: 5' 5\" (1.651 m)   Estimated body mass index is 36.44 kg/m² as calculated from the following:    Height as of this encounter: 5' 5\" (1.651 m). Weight as of this encounter: 219 lb (99.3 kg). Review of Systems   Constitutional: Positive for unexpected weight change (mild loss). Negative for chills and fever. HENT: Negative. Eyes: Negative for pain and itching. Respiratory: Negative. Negative for shortness of breath. Cardiovascular: Negative. Negative for chest pain and palpitations. Gastrointestinal: Negative for abdominal pain. Endocrine: Negative. Genitourinary: Negative for dysuria. Musculoskeletal: Positive for arthralgias and joint swelling. Negative for myalgias. Skin: Negative for rash. Neurological: Negative for headaches. Psychiatric/Behavioral: Negative for sleep disturbance and suicidal ideas. The patient is nervous/anxious. Physical Exam  Vitals and nursing note reviewed. Constitutional:       Appearance: She is well-developed. She is obese. HENT:      Head: Normocephalic. Right Ear: External ear normal.      Left Ear: External ear normal.      Nose: Nose normal.      Mouth/Throat:      Mouth: Mucous membranes are moist.   Eyes:      Conjunctiva/sclera:      Right eye: Right conjunctiva is injected. Left eye: Left conjunctiva is injected. Pupils: Pupils are equal, round, and reactive to light. Cardiovascular:      Rate and Rhythm: Normal rate and regular rhythm. Pulses: Normal pulses. Heart sounds: Normal heart sounds. Pulmonary:      Effort: Pulmonary effort is normal. No respiratory distress. Breath sounds: Normal breath sounds. Abdominal:      General: Abdomen is protuberant. Bowel sounds are normal. There is no distension. Palpations: Abdomen is soft. Tenderness: There is no abdominal tenderness. Comments: obese   Musculoskeletal:      Cervical back: Normal range of motion and neck supple. Left knee: Decreased range of motion. Tenderness present over the medial joint line and MCL. Skin:     General: Skin is warm and dry. Capillary Refill: Capillary refill takes less than 2 seconds. Neurological:      Mental Status: She is alert and oriented to person, place, and time. Psychiatric:         Mood and Affect: Mood is elated. Speech: Speech is not rapid and pressured. Behavior: Behavior normal.         Thought Content: Thought content does not include homicidal or suicidal ideation. Diagnosis Date    Abnormal Pap smear     LSIL    Blood type A+     Bone disease     fibrousdysplasia    Depression 3/30/2021    Gestational diabetes     2 out of 3 preg== 1st-diet control----2nd-insulin/diet    Liver disease     Menarche @12y/o    Parity       --  vag del -- ALVERTO 9/10/2013    Postpartum depression 2011    Dx pp depression, put on Prozac    PreE w/o SF's (G7) 3/31/2021    Venous insufficiency of right leg       Prior to Visit Medications    Medication Sig Taking? Authorizing Provider   pravastatin (PRAVACHOL) 20 MG tablet Take 1 tablet by mouth daily Yes JUSTIN Urena CNP   Prenat w/o W-JR-Nzumhax-FA-DHA (ZATEAN-PN DHA) 27-0.6-0.4-300 MG CAPS Take 1 capsule by mouth daily Yes JUSTIN Urena CNP   phentermine (ADIPEX-P) 37.5 MG tablet Take 1 tablet by mouth every morning (before breakfast) for 30 days. Yes JUSTIN Urena CNP   dapagliflozin (FARXIGA) 5 MG tablet Take 1 tablet by mouth every morning Yes JUSTIN Urena CNP   lisinopril (PRINIVIL;ZESTRIL) 20 MG tablet Take 1 tablet by mouth 2 times daily Yes JUSTIN Urena CNP   Blood Pressure Monitor KIT Use as directed.  Yes JUSTIN Urena CNP   acetaminophen (TYLENOL) 500 MG tablet Take 2 tablets by mouth 3 times daily Yes JUSTIN Morrow CNP   RA Alcohol Swabs 70 % PADS USE AS DIRECTED four times a day Yes Historical Provider, MD   vitamin D (CHOLECALCIFEROL) 25 MCG (1000 UT) TABS tablet Take 1 tablet by mouth daily  JUSTIN Urena - CNP   lisinopril (PRINIVIL;ZESTRIL) 10 MG tablet Take 2 tablets by mouth daily  JUSTIN Urena CNP   docusate sodium (COLACE) 100 MG capsule Take 1 capsule by mouth 2 times daily as needed for Constipation  Patient not taking: Reported on 8/31/2021  Concha Holland DO       ASSESSMENT/PLAN:  1. Acute pain of left knee  Worsening  Continue to evaluate  XR  Ace wrap    - XR KNEE LEFT (3 VIEWS); Future    2. Mixed hyperlipidemia  Stable  Continue therapy. Advised to decrease the consumption of red meats, fried foods, trans fats, sweets, sugary beverages. Advised to increase fish, vegetables, and fruits consumption. Advised to add fiber or OTC supplements in diet. Discussed weight loss which will result in improvement of lipids levels. Advised to increase daily physical activities and add regular exercises. - pravastatin (PRAVACHOL) 20 MG tablet; Take 1 tablet by mouth daily  Dispense: 30 tablet; Refill: 5    3. Prediabetes  Stable  DISCUSSED and ADVISED TO:  Decrease carbohydrates, sugary drinks, desserts   Exercise regularly, as tolerated. Try to lose weight.    - POCT glycosylated hemoglobin (Hb A1C)  - phentermine (ADIPEX-P) 37.5 MG tablet; Take 1 tablet by mouth every morning (before breakfast) for 30 days. Dispense: 30 tablet; Refill: 0  - dapagliflozin (FARXIGA) 5 MG tablet; Take 1 tablet by mouth every morning  Dispense: 90 tablet; Refill: 1    4. Encounter for weight loss counseling  Improving  BMI decreasing  DISCUSSED AND ADVISED TO:  Eat a low-fat and low carbohydrates diet. Avoid fried foods especially fast food. Choose healthier options for snacks.   Have 5-6 servings of fruits and vegetables per day. Cut down on eating processed food. Add 30 minutes to 1 hour aerobic exercise for 3-4 days a week. - phentermine (ADIPEX-P) 37.5 MG tablet; Take 1 tablet by mouth every morning (before breakfast) for 30 days. Dispense: 30 tablet; Refill: 0    5. Severe obesity (BMI 35.0-39. 9) with comorbidity ([address]  Improving  Third month of adipex. - Prenat w/o V-TJ-Bzebxwe-FA-DHA (ZATEAN-PN DHA) 27-0.6-0.4-300 MG CAPS; Take 1 capsule by mouth daily  Dispense: 90 capsule; Refill: 2  - phentermine (ADIPEX-P) 37.5 MG tablet; Take 1 tablet by mouth every morning (before breakfast) for 30 days. Dispense: 30 tablet; Refill: 0     On this date 8/31/2021 I have spent 30 minutes reviewing previous notes, test results and face to face with the patient discussing the diagnosis and importance of compliance with the treatment plan as well as documenting on the day of the visit. Return in about 4 weeks (around 9/28/2021) for Pls ensure BP & Wt ready for Adipex. This note was completed by using the assistance of a speech-recognition program. However, inadvertent computerized transcription errors may be present. Although every effort was made to ensure accuracy, no guarantees can be provided that every mistake has been identified and corrected by editing.   Electronically signed by JUSTIN Shankar CNP on 4/19/21 at 9:56 PM EDT     --JUSTIN Shankar CNP

## 2021-08-31 NOTE — PATIENT INSTRUCTIONS
Patient Education        Knee: Exercises  Introduction  Here are some examples of exercises for you to try. The exercises may be suggested for a condition or for rehabilitation. Start each exercise slowly. Ease off the exercises if you start to have pain. You will be told when to start these exercises and which ones will work best for you. How to do the exercises  Quad sets   1. Sit with your leg straight and supported on the floor or a firm bed. (If you feel discomfort in the front or back of your knee, place a small towel roll under your knee.)  2. Tighten the muscles on top of your thigh by pressing the back of your knee flat down to the floor. (If you feel discomfort under your kneecap, place a small towel roll under your knee.)  3. Hold for about 6 seconds, then rest for up to 10 seconds. 4. Do 8 to 12 repetitions several times a day. Straight-leg raises to the front   1. Lie on your back with your good knee bent so that your foot rests flat on the floor. Your injured leg should be straight. Make sure that your low back has a normal curve. You should be able to slip your flat hand in between the floor and the small of your back, with your palm touching the floor and your back touching the back of your hand. 2. Tighten the thigh muscles in the injured leg by pressing the back of your knee flat down to the floor. Hold your knee straight. 3. Keeping the thigh muscles tight, lift your injured leg up so that your heel is about 12 inches off the floor. Hold for about 6 seconds and then lower slowly. 4. Do 8 to 12 repetitions, 3 times a day. Straight-leg raises to the outside   1. Lie on your side, with your injured leg on top. 2. Tighten the front thigh muscles of your injured leg to keep your knee straight. 3. Keep your hip and your leg straight in line with the rest of your body, and keep your knee pointing forward. Do not drop your hip back.   4. Lift your injured leg straight up toward the ceiling, about 12 inches off the floor. Hold for about 6 seconds, then slowly lower your leg. 5. Do 8 to 12 repetitions. Straight-leg raises to the back   1. Lie on your stomach, and lift your leg straight up behind you (toward the ceiling). 2. Lift your toes about 6 inches off the floor, hold for about 6 seconds, then lower slowly. 3. Do 8 to 12 repetitions. Straight-leg raises to the inside   1. Lie on the side of your body with the injured leg. 2. You can either prop your other (good) leg up on a chair, or you can bend your good knee and put that foot in front of your injured knee. Do not drop your hip back. 3. Tighten the muscles on the front of your thigh to straighten your injured knee. 4. Keep your kneecap pointing forward, and lift your whole leg up toward the ceiling about 6 inches. Hold for about 6 seconds, then lower slowly. 5. Do 8 to 12 repetitions. Heel dig bridging   1. Lie on your back with both knees bent and your ankles bent so that only your heels are digging into the floor. Your knees should be bent about 90 degrees. 2. Then push your heels into the floor, squeeze your buttocks, and lift your hips off the floor until your shoulders, hips, and knees are all in a straight line. 3. Hold for about 6 seconds as you continue to breathe normally, and then slowly lower your hips back down to the floor and rest for up to 10 seconds. 4. Do 8 to 12 repetitions. Hamstring curls   1. Lie on your stomach with your knees straight. If your kneecap is uncomfortable, roll up a washcloth and put it under your leg just above your kneecap. 2. Lift the foot of your injured leg by bending the knee so that you bring the foot up toward your buttock. If this motion hurts, try it without bending your knee quite as far. This may help you avoid any painful motion. 3. Slowly lower your leg back to the floor. 4. Do 8 to 12 repetitions.   5. With permission from your doctor or physical therapist, you may also want to add a cuff weight to your ankle (not more than 5 pounds). With weight, you do not have to lift your leg more than 12 inches to get a hamstring workout. Shallow standing knee bends   Do this exercise only if you have very little pain; if you have no clicking, locking, or giving way if you have an injured knee; and if it does not hurt while you are doing 8 to 12 repetitions. 1. Stand with your hands lightly resting on a counter or chair in front of you. Put your feet shoulder-width apart. 2. Slowly bend your knees so that you squat down like you are going to sit in a chair. Make sure your knees do not go in front of your toes. 3. Lower yourself about 6 inches. Your heels should remain on the floor at all times. 4. Rise slowly to a standing position. Heel raises   1. Stand with your feet a few inches apart, with your hands lightly resting on a counter or chair in front of you. 2. Slowly raise your heels off the floor while keeping your knees straight. 3. Hold for about 6 seconds, then slowly lower your heels to the floor. 4. Do 8 to 12 repetitions several times during the day. Follow-up care is a key part of your treatment and safety. Be sure to make and go to all appointments, and call your doctor if you are having problems. It's also a good idea to know your test results and keep a list of the medicines you take. Where can you learn more? Go to https://[email or web address]. org and sign in to your [employer] account. Enter S056 in the [facility] box to learn more about \"Knee: Exercises. \"     If you do not have an account, please click on the \"Sign Up Now\" link. Current as of: November 16, 2020               Content Version: 12.9  © 5387-4760 Healthwise, Incorporated. Care instructions adapted under license by [facility] ([facility]).  If you have questions about a medical condition or this instruction, always ask your healthcare professional. [address] any warranty or liability for your use of this information.

## 2021-08-31 NOTE — PROGRESS NOTES
Visit Information    Have you changed or started any medications since your last visit including any over-the-counter medicines, vitamins, or herbal medicines? no   Are you having any side effects from any of your medications? -  no  Have you stopped taking any of your medications? Is so, why? -  no    Have you seen any other physician or provider since your last visit? No  Have you had any other diagnostic tests since your last visit? No  Have you been seen in the emergency room and/or had an admission to a hospital since we last saw you? No  Have you had your routine dental cleaning in the past 6 months? no    Have you activated your [employer] account? If not, what are your barriers?  Yes     Patient Care Team:  JUSTIN Veloz CNP as PCP - General (Family Medicine)  JUSITN Veloz CNP as PCP - [facility]  Angel Wilson MD as Obstetrician (Perinatology)    Medical History Review  Past Medical, Family, and Social History reviewed and does contribute to the patient presenting condition    Health Maintenance   Topic Date Due    COVID-19 Vaccine (1) Never done    Hepatitis B vaccine (1 of 3 - Risk 3-dose series) Never done    Flu vaccine (1) 09/01/2021    A1C test (Diabetic or Prediabetic)  04/26/2022    Lipid screen  04/26/2022    Potassium monitoring  04/26/2022    Creatinine monitoring  04/26/2022    DTaP/Tdap/Td vaccine (3 - Td or Tdap) 06/18/2025    Cervical cancer screen  10/27/2025    Pneumococcal 0-64 years Vaccine (2 of 2 - PPSV23) 08/30/2051    Hepatitis C screen  Completed    HIV screen  Completed    Hepatitis A vaccine  Aged Out    Hib vaccine  Aged Out    Meningococcal (ACWY) vaccine  Aged Out    Varicella vaccine  Discontinued

## 2021-08-31 NOTE — LETTER
[facility]  [address] [place], [place] [id number] [facility] [address]  Phone: [phone]  Fax: [phone]    Camille Mei CNP        August 31, 2021     Patient: Candace Ying   YOB: 1985   Date of Visit: 8/31/2021       To Whom it May Concern:    Dilma Truong was seen in my clinic on 8/31/2021. She may return to work on September 7, 2021 Please excuse her from then. If you have any questions or concerns, please don't hesitate to call.     Sincerely,         JUSTIN Urena - CNP

## 2021-09-02 ENCOUNTER — HOSPITAL ENCOUNTER (OUTPATIENT)
Dept: GENERAL RADIOLOGY | Age: 36
Discharge: HOME | End: 2021-09-04
Payer: COMMERCIAL

## 2021-09-02 ENCOUNTER — HOSPITAL ENCOUNTER (OUTPATIENT)
Age: 36
Discharge: HOME | End: 2021-09-04
Payer: COMMERCIAL

## 2021-09-02 DIAGNOSIS — M25.562 ACUTE PAIN OF LEFT KNEE: ICD-10-CM

## 2021-09-02 PROCEDURE — 73562 X-RAY EXAM OF KNEE 3: CPT

## 2021-09-02 NOTE — RESULT ENCOUNTER NOTE
Let her know that she does not have any fracture on the knee. She strained her MCL ligament like we discussed on her visit. Limit squats and intense working out on that knee. Ace wrap. Thanks.

## 2021-09-23 ENCOUNTER — HOSPITAL ENCOUNTER (EMERGENCY)
Age: 36
Discharge: HOME | End: 2021-09-23
Attending: EMERGENCY MEDICINE
Payer: COMMERCIAL

## 2021-09-23 ENCOUNTER — TELEPHONE (OUTPATIENT)
Dept: FAMILY MEDICINE CLINIC | Age: 36
End: 2021-09-23

## 2021-09-23 ENCOUNTER — APPOINTMENT (OUTPATIENT)
Dept: GENERAL RADIOLOGY | Age: 36
End: 2021-09-23
Payer: COMMERCIAL

## 2021-09-23 VITALS
OXYGEN SATURATION: 99 % | SYSTOLIC BLOOD PRESSURE: 122 MMHG | WEIGHT: 200 LBS | BODY MASS INDEX: 33.32 KG/M2 | RESPIRATION RATE: 21 BRPM | HEART RATE: 71 BPM | HEIGHT: 65 IN | DIASTOLIC BLOOD PRESSURE: 60 MMHG | TEMPERATURE: 98.2 F

## 2021-09-23 DIAGNOSIS — R07.9 CHEST PAIN, UNSPECIFIED TYPE: Primary | ICD-10-CM

## 2021-09-23 LAB
ABSOLUTE EOS #: 0.1 K/UL (ref 0–0.4)
ABSOLUTE IMMATURE GRANULOCYTE: ABNORMAL K/UL (ref 0–0.3)
ABSOLUTE LYMPH #: 1.7 K/UL (ref 1–4.8)
ABSOLUTE MONO #: 0.5 K/UL (ref 0.1–1.3)
ANION GAP SERPL CALCULATED.3IONS-SCNC: 9 MMOL/L (ref 9–17)
BASOPHILS # BLD: 1 % (ref 0–2)
BASOPHILS ABSOLUTE: 0.1 K/UL (ref 0–0.2)
BNP INTERPRETATION: NORMAL
BUN BLDV-MCNC: 8 MG/DL (ref 6–20)
BUN/CREAT BLD: ABNORMAL (ref 9–20)
CALCIUM SERPL-MCNC: 9.4 MG/DL (ref 8.6–10.4)
CHLORIDE BLD-SCNC: 103 MMOL/L (ref 98–107)
CO2: 24 MMOL/L (ref 20–31)
CREAT SERPL-MCNC: 0.55 MG/DL (ref 0.5–0.9)
D-DIMER QUANTITATIVE: <0.27 MG/L FEU (ref 0–0.59)
DIFFERENTIAL TYPE: ABNORMAL
EOSINOPHILS RELATIVE PERCENT: 1 % (ref 0–4)
GFR AFRICAN AMERICAN: >60 ML/MIN
GFR NON-AFRICAN AMERICAN: >60 ML/MIN
GFR SERPL CREATININE-BSD FRML MDRD: ABNORMAL ML/MIN/{1.73_M2}
GFR SERPL CREATININE-BSD FRML MDRD: ABNORMAL ML/MIN/{1.73_M2}
GLUCOSE BLD-MCNC: 115 MG/DL (ref 70–99)
HCT VFR BLD CALC: 44.5 % (ref 36–46)
HEMOGLOBIN: 15 G/DL (ref 12–16)
IMMATURE GRANULOCYTES: ABNORMAL %
INR BLD: 1.1
LYMPHOCYTES # BLD: 20 % (ref 24–44)
MCH RBC QN AUTO: 32 PG (ref 26–34)
MCHC RBC AUTO-ENTMCNC: 33.7 G/DL (ref 31–37)
MCV RBC AUTO: 94.8 FL (ref 80–100)
MONOCYTES # BLD: 6 % (ref 1–7)
MYOGLOBIN: <21 NG/ML (ref 25–58)
MYOGLOBIN: <21 NG/ML (ref 25–58)
NRBC AUTOMATED: ABNORMAL PER 100 WBC
PARTIAL THROMBOPLASTIN TIME: 35.4 SEC (ref 24–36)
PDW BLD-RTO: 13.8 % (ref 11.5–14.9)
PLATELET # BLD: 243 K/UL (ref 150–450)
PLATELET ESTIMATE: ABNORMAL
PMV BLD AUTO: 7.5 FL (ref 6–12)
POTASSIUM SERPL-SCNC: 4 MMOL/L (ref 3.7–5.3)
PRO-BNP: 85 PG/ML
PROTHROMBIN TIME: 14.2 SEC (ref 11.8–14.6)
RBC # BLD: 4.7 M/UL (ref 4–5.2)
RBC # BLD: ABNORMAL 10*6/UL
SARS-COV-2, RAPID: NOT DETECTED
SEG NEUTROPHILS: 72 % (ref 36–66)
SEGMENTED NEUTROPHILS ABSOLUTE COUNT: 6.3 K/UL (ref 1.3–9.1)
SODIUM BLD-SCNC: 136 MMOL/L (ref 135–144)
SPECIMEN DESCRIPTION: NORMAL
THYROXINE, FREE: 1.19 NG/DL (ref 0.93–1.7)
TROPONIN INTERP: ABNORMAL
TROPONIN INTERP: ABNORMAL
TROPONIN T: ABNORMAL NG/ML
TROPONIN T: ABNORMAL NG/ML
TROPONIN, HIGH SENSITIVITY: <6 NG/L (ref 0–14)
TROPONIN, HIGH SENSITIVITY: <6 NG/L (ref 0–14)
TSH SERPL DL<=0.05 MIU/L-ACNC: 1.04 MIU/L (ref 0.3–5)
WBC # BLD: 8.6 K/UL (ref 3.5–11)
WBC # BLD: ABNORMAL 10*3/UL

## 2021-09-23 PROCEDURE — 36415 COLL VENOUS BLD VENIPUNCTURE: CPT

## 2021-09-23 PROCEDURE — 6370000000 HC RX 637 (ALT 250 FOR IP): Performed by: EMERGENCY MEDICINE

## 2021-09-23 PROCEDURE — 83880 ASSAY OF NATRIURETIC PEPTIDE: CPT

## 2021-09-23 PROCEDURE — 83874 ASSAY OF MYOGLOBIN: CPT

## 2021-09-23 PROCEDURE — 85610 PROTHROMBIN TIME: CPT

## 2021-09-23 PROCEDURE — 93005 ELECTROCARDIOGRAM TRACING: CPT | Performed by: EMERGENCY MEDICINE

## 2021-09-23 PROCEDURE — 80048 BASIC METABOLIC PNL TOTAL CA: CPT

## 2021-09-23 PROCEDURE — 85025 COMPLETE CBC W/AUTO DIFF WBC: CPT

## 2021-09-23 PROCEDURE — 84443 ASSAY THYROID STIM HORMONE: CPT

## 2021-09-23 PROCEDURE — 87635 SARS-COV-2 COVID-19 AMP PRB: CPT

## 2021-09-23 PROCEDURE — 85379 FIBRIN DEGRADATION QUANT: CPT

## 2021-09-23 PROCEDURE — 84439 ASSAY OF FREE THYROXINE: CPT

## 2021-09-23 PROCEDURE — 99284 EMERGENCY DEPT VISIT MOD MDM: CPT

## 2021-09-23 PROCEDURE — 84484 ASSAY OF TROPONIN QUANT: CPT

## 2021-09-23 PROCEDURE — 85730 THROMBOPLASTIN TIME PARTIAL: CPT

## 2021-09-23 PROCEDURE — 71045 X-RAY EXAM CHEST 1 VIEW: CPT

## 2021-09-23 RX ORDER — ASPIRIN 81 MG/1
324 TABLET, CHEWABLE ORAL ONCE
Status: COMPLETED | OUTPATIENT
Start: 2021-09-23 | End: 2021-09-23

## 2021-09-23 RX ADMIN — ASPIRIN 324 MG: 81 TABLET, CHEWABLE ORAL at 11:14

## 2021-09-23 ASSESSMENT — ENCOUNTER SYMPTOMS
EYE DISCHARGE: 0
FACIAL SWELLING: 0
NAUSEA: 1
CHEST TIGHTNESS: 0
WHEEZING: 0
SINUS PRESSURE: 0
BACK PAIN: 0
CONSTIPATION: 0
SHORTNESS OF BREATH: 1
EYE REDNESS: 0
RHINORRHEA: 0
VOMITING: 0
ABDOMINAL PAIN: 0
BLOOD IN STOOL: 0
SORE THROAT: 0
DIARRHEA: 0
EYE PAIN: 0
COUGH: 0
COLOR CHANGE: 0
TROUBLE SWALLOWING: 0

## 2021-09-23 ASSESSMENT — PAIN DESCRIPTION - PAIN TYPE: TYPE: ACUTE PAIN

## 2021-09-23 ASSESSMENT — PAIN SCALES - GENERAL: PAINLEVEL_OUTOF10: 5

## 2021-09-23 ASSESSMENT — PAIN DESCRIPTION - LOCATION: LOCATION: CHEST

## 2021-09-23 NOTE — ED NOTES
Pt comes to this ER with c/o dyspnea, SOB, midsternal chest tightness, and feeling shaky. Pt stastes, \"I just couldn't breathe for nothing. \"  Pt states onset of s/s's was around 0630 this morning while at work. Pt arrives A+O x 4, GCS = 15, PMS x 4 intact, eupneic, and PWD. Pulse is regular et strong with s1 and s2 heart tones. Lung sounds clear t/o bilat, and she speaks in complete sentences without difficulty. Pt has no visible peripheral edema.      Corbin Cotter RN  09/23/21 [id number]

## 2021-09-23 NOTE — TELEPHONE ENCOUNTER
----- Message from Diane Fofana sent at 9/23/2021  9:26 AM EDT -----  Subject: Medication Problem    QUESTIONS  Name of Medication? phentermine (ADIPEX-P) 37.5 MG tablet  Patient-reported dosage and instructions? 1 tablet every morning  What question or problem do you have with the medication? Since the   increase in dose, she is having chest pain and fatigue. What should she   do? Please reach out to patient asap. Preferred Pharmacy? [facility] [phone] [place] [phone]  Pharmacy phone number (if available)? [phone]  Additional Information for Provider?   ---------------------------------------------------------------------------  --------------  [address]  What is the best way for the office to contact you? OK to leave message on   voicemail  Preferred Call Back Phone Number? [phone]  ---------------------------------------------------------------------------  --------------  SCRIPT ANSWERS  Relationship to Patient?  Self

## 2021-09-23 NOTE — TELEPHONE ENCOUNTER
Pt is complaining of increased fatigue and chest pain not needing a refill.  Looked in pts chart and looks like she went in to ER

## 2021-09-23 NOTE — ED PROVIDER NOTES
[facility] [address] [facility] ED  eMERGENCY dEPARTMENT eNCOUnter      Pt Name: Shruthi Damon  MRN: [id number]  [place] 1985  Date of evaluation: 9/23/21      CHIEF COMPLAINT       Chief Complaint   Patient presents with    Chest Pain    Shortness of Breath         HISTORY OF PRESENT ILLNESS    Shruthi Damon is a 29 y.o. female who presents complaining of chest pain. Patient states that around 6 AM when she was on her way into work she started feeling off. Patient states she started having shortness of breath while at work got significantly worse to the point where she just felt like she could not breathe and started having some chest pain with activity. Patient states with this that she does have some palpitations early on when this started she felt like her heart was really racing. Patient states she has had headaches. Patient states that she had a funny taste in her mouth on her way into work also. Patient has not been ill recently with no cough no vomiting. Patient has since felt a little nauseous. Patient states she did just recently a couple days ago to start taking an increased dose of Adipex. REVIEW OF SYSTEMS       Review of Systems   Constitutional: Negative for activity change, appetite change, chills, diaphoresis and fever. HENT: Negative for congestion, ear pain, facial swelling, nosebleeds, rhinorrhea, sinus pressure, sore throat and trouble swallowing. Eyes: Negative for pain, discharge and redness. Respiratory: Positive for shortness of breath. Negative for cough, chest tightness and wheezing. Cardiovascular: Positive for chest pain and palpitations. Negative for leg swelling. Gastrointestinal: Positive for nausea. Negative for abdominal pain, blood in stool, constipation, diarrhea and vomiting. Genitourinary: Negative for difficulty urinating, dysuria, flank pain, frequency, genital sores and hematuria.    Musculoskeletal: Negative for arthralgias, back pain, gait problem, joint swelling, myalgias and neck pain. Skin: Negative for color change, pallor, rash and wound. Neurological: Positive for headaches. Negative for dizziness, tremors, seizures, syncope, speech difficulty, weakness and numbness. Psychiatric/Behavioral: Negative for confusion, decreased concentration, hallucinations, self-injury, sleep disturbance and suicidal ideas. PAST MEDICAL HISTORY     Past Medical History:   Diagnosis Date    Abnormal Pap smear     LSIL    Blood type A+     Bone disease     fibrousdysplasia    Depression 3/30/2021    Gestational diabetes     2 out of 3 preg== 1st-diet control----2nd-insulin/diet    Liver disease     Menarche @14y/o    Parity       --  vag del -- ALVERTO 9/10/2013    Postpartum depression 2011    Dx pp depression, put on Prozac    PreE w/o SF's (G7) 3/31/2021    Venous insufficiency of right leg        SURGICAL HISTORY       Past Surgical History:   Procedure Laterality Date    DILATION AND CURETTAGE      DILATION AND CURETTAGE OF UTERUS      LEG SURGERY Right 10/2012    right leg d/t fibrousdysplaia    UMBILICAL HERNIA REPAIR N/A     HERNIA UMBILICAL REPAIR W/MESH performed by Frantz Del Castillo MD at [place] [address]       Current Discharge Medication List      CONTINUE these medications which have NOT CHANGED    Details   pravastatin (PRAVACHOL) 20 MG tablet Take 1 tablet by mouth daily  Qty: 30 tablet, Refills: 5    Associated Diagnoses: Mixed hyperlipidemia      Prenat w/o W-FE-Nsihqhf-FA-DHA (ZATEAN-PN DHA) 27-0.6-0.4-300 MG CAPS Take 1 capsule by mouth daily  Qty: 90 capsule, Refills: 2    Associated Diagnoses: Severe obesity (BMI 35.0-39. 9) with comorbidity (HCC)      phentermine (ADIPEX-P) 37.5 MG tablet Take 1 tablet by mouth every morning (before breakfast) for 30 days. Qty: 30 tablet, Refills: 0    Associated Diagnoses: Prediabetes; Encounter for weight loss counseling; Severe obesity (BMI 35.0-39. 9) with Heart sounds: Normal heart sounds. No murmur heard. No friction rub. No gallop. Pulmonary:      Effort: Pulmonary effort is normal. No respiratory distress. Breath sounds: Normal breath sounds. No wheezing or rales. Chest:      Chest wall: No tenderness. Abdominal:      General: Bowel sounds are normal. There is no distension. Palpations: Abdomen is soft. There is no mass. Tenderness: There is no abdominal tenderness. There is no guarding or rebound. Musculoskeletal:         General: No tenderness. Normal range of motion. Skin:     General: Skin is warm and dry. Coloration: Skin is not pale. Findings: No erythema or rash. Neurological:      Mental Status: She is alert and oriented to person, place, and time. Cranial Nerves: No cranial nerve deficit. Sensory: No sensory deficit. Motor: No abnormal muscle tone. Coordination: Coordination normal.      Deep Tendon Reflexes: Reflexes normal.   Psychiatric:         Behavior: Behavior normal.         Thought Content: Thought content normal.         Judgment: Judgment normal.         MEDICAL DECISION MAKING:     Patient symptoms I think are most likely related to her increased dose of Adipex but she also has multiple cardiac risk factors we will do a full cardiac work-up get a D-dimer and also check Covid though she does not really sound like she is ill. If everything comes back negative I think she will be okay to be discharged home.     DIAGNOSTIC RESULTS     EKG: All EKG's are interpreted by the Emergency Department Physician who either signs or Co-signs this chart in the absence of a cardiologist.    EKG Interpretation    Interpreted by me    Rhythm: normal sinus   Rate: normal  Axis: normal  Ectopy: none  Conduction: normal  ST Segments: no acute change  T Waves: no acute change  Q Waves: none    Clinical Impression: no acute changes and normal EKG    RADIOLOGY:All plain film, CT, MRI, and formal ultrasound images (except ED bedside ultrasound)are read by the radiologist and interpretations are directly viewed by the emergency physician. XR CHEST PORTABLE    Result Date: 9/23/2021  EXAMINATION: ONE XRAY VIEW OF THE CHEST 9/23/2021 10:28 am COMPARISON: None. HISTORY: ORDERING SYSTEM PROVIDED HISTORY: Chest Pain TECHNOLOGIST PROVIDED HISTORY: Chest Pain Reason for Exam: chest pain Acuity: Unknown Type of Exam: Unknown FINDINGS: Normal cardiopericardial silhouette There are no significant pleural, parenchymal or mediastinal findings     No acute cardiopulmonary findings           LABS: All lab results were reviewed byglo, and all abnormals are listed below.   Labs Reviewed   BASIC METABOLIC PANEL - Abnormal; Notable for the following components:       Result Value    Glucose 115 (*)     All other components within normal limits   CBC WITH AUTO DIFFERENTIAL - Abnormal; Notable for the following components:    Seg Neutrophils 72 (*)     Lymphocytes 20 (*)     All other components within normal limits   TROP/MYOGLOBIN - Abnormal; Notable for the following components:    Myoglobin <21 (*)     All other components within normal limits   TROP/MYOGLOBIN - Abnormal; Notable for the following components:    Myoglobin <21 (*)     All other components within normal limits   COVID-19, RAPID   BRAIN NATRIURETIC PEPTIDE   D-DIMER, QUANTITATIVE   TSH WITHOUT REFLEX   T4, FREE   APTT   PROTIME-INR         EMERGENCY DEPARTMENT COURSE:   Vitals:    Vitals:    09/23/21 1018 09/23/21 1021 09/23/21 1114 09/23/21 1115   BP: 130/76  115/64 117/77   Pulse: 79  74 77   Resp:  18 15 23   Temp: 98.2 °F (36.8 °C)      TempSrc: Oral      SpO2:  96% 97% 98%   Weight: 200 lb (90.7 kg)      Height: 5' 5\" (1.651 m)          The patient was given the following medications while in the emergency department:     Orders Placed This Encounter   Medications    aspirin chewable tablet 324 mg       -------------------------  1:15 PM EDT  Patient has been reevaluated and updated on results. Even though patient does have risk factors for cardiac disease I think most likely her symptoms are probably related to her increase in the Adipex. Because of this I think I feel comfortable discharging her home and she is comfortable going home she will call her doctor about the medication and also to see if they want to do anything further about the chest pain. CRITICAL CARE:   None    CONSULTS:  None    PROCEDURES:  None    FINAL IMPRESSION      1.  Chest pain, unspecified type          DISPOSITION/PLAN   DISPOSITION Decision To Discharge 09/23/2021 01:14:59 PM      PATIENT REFERRED TO:  Trevor Wynne, JUSTIN [facility]  [address] [place] [address]  [phone]    Schedule an appointment as soon as possible for a visit in 3 days  Follow up within 3 days, Return to ED sooner if symptoms worsen    [address]  [place] [id number] [phone]    If symptoms worsen      DISCHARGE MEDICATIONS:  Current Discharge Medication List          (Please note that portions of this note were completed with a voice recognition program.  Efforts were made to edit the dictations but occasionally words are mis-transcribed.)    Barbara Kilgore MD  Attending Tacos Cast MD  09/23/21 0981

## 2021-09-23 NOTE — ED TRIAGE NOTES
Mode of arrival (squad #, walk in, police, etc) : Walk In        Chief complaint(s): Chest pain, shortness of breath        Arrival Note (brief scenario, treatment PTA, etc). : Pt arrives to ED c/o chest pain and shortness of breath that she states started this morning. Patient denies any cardiac history. Patient is placed on cardiac monitor and continuous pulse oximetry. Patient is resting on stretcher with no s/s of distress. C= \"Have you ever felt that you should Cut down on your drinking? \"  No  A= \"Have people Annoyed you by criticizing your drinking? \"  No  G= \"Have you ever felt bad or Guilty about your drinking? \"  No  E= \"Have you ever had a drink as an Eye-opener first thing in the morning to steady your nerves or to help a hangover? \"  No      Deferred []      Reason for deferring: N/A    *If yes to two or more: probable alcohol abuse. *

## 2021-09-23 NOTE — TELEPHONE ENCOUNTER
Patient was evaluated in the emergency room    Future Appointments   Date Time Provider Junior Perez   10/4/2021 11:00 AM [facility]

## 2021-09-26 LAB
EKG ATRIAL RATE: 60 BPM
EKG ATRIAL RATE: 80 BPM
EKG P AXIS: 3 DEGREES
EKG P AXIS: 59 DEGREES
EKG P-R INTERVAL: 132 MS
EKG P-R INTERVAL: 140 MS
EKG Q-T INTERVAL: 378 MS
EKG Q-T INTERVAL: 412 MS
EKG QRS DURATION: 100 MS
EKG QRS DURATION: 100 MS
EKG QTC CALCULATION (BAZETT): 412 MS
EKG QTC CALCULATION (BAZETT): 435 MS
EKG R AXIS: -31 DEGREES
EKG R AXIS: 88 DEGREES
EKG T AXIS: -5 DEGREES
EKG T AXIS: 51 DEGREES
EKG VENTRICULAR RATE: 60 BPM
EKG VENTRICULAR RATE: 80 BPM

## 2021-09-26 PROCEDURE — 93010 ELECTROCARDIOGRAM REPORT: CPT | Performed by: INTERNAL MEDICINE

## 2021-10-03 ASSESSMENT — ENCOUNTER SYMPTOMS
RESPIRATORY NEGATIVE: 1
EYE PAIN: 0
ABDOMINAL PAIN: 0
EYE ITCHING: 0
SHORTNESS OF BREATH: 0

## 2021-10-03 NOTE — PROGRESS NOTES
[facility] PHYSICIANS  [facility] [address]  [address] [address]  Dept: [address] (:  1985) is a 29 y.o. female,Established patient, here for evaluation of the following chief complaint(s):  Chief Complaint   Patient presents with    Weight Management     adipex     Other     was seen in ER and states she thinks she is DM has been taking metformin     Other     states blood sugar has been running a bit high after she wakes up    [employer] will not cover Jessi Greek will need something else         SUBJECTIVE/OBJECTIVE:  Knee Pain       Jyothi Lazar is a 29 y.o. female patient. Patient is an established patient. She has a known history of prediabetes, obesity, and unintentional weight gain    PREDIABETES  Patient has a  stable Diabetes Mellitus. Current therapy includes Metformin but had some diarrhea. [place] was denied we will send extended release Metformin. . Patient does not shcek glucose levels. . Patient denieshypoglycemia episodes such as{symptoms. Patient denies neither vomiting nor diarrhea. Lab Results   Component Value Date/Time    LABA1C 5.6 2021 02:58 PM    LABA1C 5.8 2021 10:45 AM      ADJUSTMENT DISORDER DEPRESSION AND ANXIETY  Jyothi Lazar reported some ongoing issues with depression and anxiety. Reported her mom's death anniversary had caused a lot of anxiety and stress. Patient's kids are also in school. Have had some adjusting done. She ended up quitting from work since she had a lot of absences. Symptoms includes feelings of losing control, insomnia and insomnia. Current therapy includes will start Wellbutrin, which is working well for her. she denies adverse reaction to current therapy. she also denies suicidal/homicidal ideation, plan or intent. .  PHQ-2 Over the past 2 weeks, how often have you been bothered by any of the following problems?   Little interest or pleasure in doing things: Not at all  Feeling down, depressed, or hopeless: Several days  PHQ-2 Score: 1  PHQ-9 Over the past 2 weeks, how often have you been bothered by any of the following problems? Thoughts that you would be better off dead, or of hurting yourself in some way: Not at all  PHQ-9 Total Score: 1   ANTHONY-7 SCREENING 10/4/2021   Feeling nervous, anxious, or on edge 1-Several days   Not able to stop or control worrying 1-Several days   Worrying too much about different things 0-Not at all   Trouble relaxing 0-Not at all   Being so restless that it's hard to sit still 0-Not at all   Becoming easily annoyed or irritable 1-Several days   Feeling afraid as if something awful might happen 1-Several days   ANTHONY-7 Total Score 4       OBESITY  Patient's BMI is Body mass index is 35.28 kg/m². kg/m2. BMI is increasing. Patient just finished a course of Adipex she has lost some weight but gained some of them back. Patient was having a lot of stress at home as mentioned above. We were thinking about starting her on Topamax. However, since she presented with some anxiety and depression we will start her on some Wellbutrin instead. Patient understands that this condition increases the patient's risk for chronic conditions. Wt Readings from Last 3 Encounters:   10/04/21 212 lb (96.2 kg)   09/23/21 200 lb (90.7 kg)   08/31/21 219 lb (99.3 kg)     Patient advised to practice healthy eating habits. Diet should include plenty of fruits, vegetables, whole grains, lean protein, and low-fat dairy. Increase water consumption. Reduce consumption of dietary sugar and sugar-sweetened beverages. Increasing physical exercises at least 3-4 times a week. In addition to the medication, patient also using diet and exercise as follows:  -diet: We'll start with walking-- daily- walk, do more weights.    -exercise: Talked about macros    BP Readings from Last 3 Encounters:   10/04/21 110/80   09/23/21 122/60   08/31/21 118/72      Pulse Readings from Last 3 Encounters:   10/04/21 78   09/23/21 71   08/31/21 68     Wt Readings from Last 3 Encounters:   10/04/21 212 lb (96.2 kg)   09/23/21 200 lb (90.7 kg)   08/31/21 219 lb (99.3 kg)     No flowsheet data found. Vitals:    10/04/21 1103   BP: 110/80   Pulse: 78   Temp: 97.1 °F (36.2 °C)   SpO2: 98%   Weight: 212 lb (96.2 kg)   Height: 5' 5\" (1.651 m)   Estimated body mass index is 35.28 kg/m² as calculated from the following:    Height as of this encounter: 5' 5\" (1.651 m). Weight as of this encounter: 212 lb (96.2 kg). Review of Systems   Constitutional: Positive for unexpected weight change (gained some weight). Negative for chills, fatigue and fever. HENT: Negative. Eyes: Negative for pain and itching. Respiratory: Negative. Negative for shortness of breath. Cardiovascular: Negative. Negative for chest pain and palpitations. Gastrointestinal: Negative for abdominal pain. Endocrine: Negative. Genitourinary: Negative for dysuria. Musculoskeletal: Negative for myalgias. Skin: Negative for rash. Neurological: Negative for headaches. Psychiatric/Behavioral: Positive for dysphoric mood. Negative for sleep disturbance and suicidal ideas. The patient is nervous/anxious. Physical Exam  Vitals and nursing note reviewed. Constitutional:       Appearance: She is well-developed. She is obese. HENT:      Head: Normocephalic. Right Ear: External ear normal.      Left Ear: External ear normal.      Nose: Nose normal.      Mouth/Throat:      Mouth: Mucous membranes are moist.   Eyes:      Pupils: Pupils are equal, round, and reactive to light. Cardiovascular:      Rate and Rhythm: Normal rate and regular rhythm. Pulses: Normal pulses. Heart sounds: Normal heart sounds. Pulmonary:      Effort: Pulmonary effort is normal. No respiratory distress. Breath sounds: Normal breath sounds. Abdominal:      General: Abdomen is protuberant.  Bowel sounds are normal. There is no distension. Palpations: Abdomen is soft. Tenderness: There is no abdominal tenderness. Comments: obese   Musculoskeletal:      Cervical back: Normal range of motion and neck supple. Left knee: Decreased range of motion. Tenderness present over the medial joint line and MCL. Skin:     General: Skin is warm and dry. Capillary Refill: Capillary refill takes less than 2 seconds. Neurological:      Mental Status: She is alert and oriented to person, place, and time. Psychiatric:         Mood and Affect: Mood is depressed. Affect is tearful. Speech: Speech is rapid and pressured. Behavior: Behavior normal.         Thought Content: Thought content does not include homicidal or suicidal ideation. Diagnosis Date    Abnormal Pap smear     LSIL    Blood type A+     Bone disease     fibrousdysplasia    Depression 3/30/2021    Gestational diabetes     2 out of 3 preg== 1st-diet control----2nd-insulin/diet    Liver disease     Menarche @12y/o    Parity       --  vag del -- ALVERTO 9/10/2013    Postpartum depression 2011    Dx pp depression, put on Prozac    PreE w/o SF's (G7) 3/31/2021    Venous insufficiency of right leg       Prior to Visit Medications    Medication Sig Taking?  Authorizing Provider   vitamin D (CHOLECALCIFEROL) 25 MCG (1000 UT) TABS tablet Take 1 tablet by mouth daily Yes JUSTIN Urena CNP   metFORMIN (GLUCOPHAGE-XR) 500 MG extended release tablet Take 1 tablet by mouth daily (with breakfast) Yes JUSTIN Urena CNP   buPROPion (WELLBUTRIN XL) 150 MG extended release tablet Take 1 tablet by mouth 2 times daily Yes JUSTIN Urena CNP   pravastatin (PRAVACHOL) 20 MG tablet Take 1 tablet by mouth daily Yes JUSTIN Urena CNP   Prenat w/o T-DU-Qeepypu-FA-DHA (ZATEAN-PN DHA) 27-0.6-0.4-300 MG CAPS Take 1 capsule by mouth daily Yes JUSTIN Urena CNP   lisinopril (PRINIVIL;ZESTRIL) 20 MG tablet Take 1 tablet by mouth 2 times daily Yes JUSTIN Urena CNP   acetaminophen (TYLENOL) 500 MG tablet Take 2 tablets by mouth 3 times daily Yes JUSTIN Nielsen CNP   RA Alcohol Swabs 70 % PADS USE AS DIRECTED four times a day Yes Historical Provider, MD   Blood Pressure Monitor KIT Use as directed. Patient not taking: Reported on 10/4/2021  JUSTIN Urena CNP   lisinopril (PRINIVIL;ZESTRIL) 10 MG tablet Take 2 tablets by mouth daily  JUSTIN Urena CNP       ASSESSMENT/PLAN:  1. Prediabetes  Stable  Change Metformin to extended release  FARCIGA denied  DISCUSSED and ADVISED TO:  Decrease carbohydrates, sugary drinks, desserts   Exercise regularly, as tolerated. Try to lose weight. - metFORMIN (GLUCOPHAGE-XR) 500 MG extended release tablet; Take 1 tablet by mouth daily (with breakfast)  Dispense: 90 tablet; Refill: 1    2. Adjustment disorder with mixed anxiety and depressed mood  Worsening  Continue current therapy. Discussed how to recognize anxiety. Advised to relieve tension with exercise or a massage. Advised to get enough rest.  Advised to avoid alcohol, caffeine, nicotine, and illegal drugs. Which can increase anxiety level and cause sleep problems. - buPROPion (WELLBUTRIN XL) 150 MG extended release tablet; Take 1 tablet by mouth 2 times daily  Dispense: 180 tablet; Refill: 1    3. Vitamin D deficiency  Continue Vitamin D supplementation  DISCUSSED AND ADVISED TO:  Foods that contain a lot of vitamin D includes [place], tuna, and mackerel. Cheese, egg yolks, and beef liver have small amounts of vit D.  Milk, soy drinks, orange juice, yogurt, margarine, and some kinds of cereal have vitamin D added to them. Continue to use sunblock when out in the sun to prevent skin cancer.    - vitamin D (CHOLECALCIFEROL) 25 MCG (1000 UT) TABS tablet; Take 1 tablet by mouth daily  Dispense: 90 tablet; Refill: 5    4.  Encounter for weight loss counseling  Failure to Improve  Start wellbutrim    5. Class 1 obesity due to excess calories without serious comorbidity with body mass index (BMI) of 33.0 to 33.9 in adult  BMI increasing  DISCUSSED AND ADVISED TO:  Eat a low-fat and low carbohydrates diet. Avoid fried foods especially fast food. Choose healthier options for snacks. Have 5-6 servings of fruits and vegetables per day. Cut down on eating processed food. Add 30 minutes to 1 hour aerobic exercise for 3-4 days a week. - metFORMIN (GLUCOPHAGE-XR) 500 MG extended release tablet; Take 1 tablet by mouth daily (with breakfast)  Dispense: 90 tablet; Refill: 1      On this date 10/4/2021 I have spent 30 minutes reviewing previous notes, test results and face to face with the patient discussing the diagnosis and importance of compliance with the treatment plan as well as documenting on the day of the visit. Return in about 3 months (around 1/4/2022) for Chronic conditions, 30mins. This note was completed by using the assistance of a speech-recognition program. However, inadvertent computerized transcription errors may be present. Although every effort was made to ensure accuracy, no guarantees can be provided that every mistake has been identified and corrected by editing.   Electronically signed by JUSTIN Meraz CNP on 4/19/21 at 9:56 PM EDT     --JUSTIN Meraz CNP

## 2021-10-04 ENCOUNTER — OFFICE VISIT (OUTPATIENT)
Dept: FAMILY MEDICINE CLINIC | Age: 36
End: 2021-10-04
Payer: COMMERCIAL

## 2021-10-04 VITALS
HEART RATE: 78 BPM | OXYGEN SATURATION: 98 % | TEMPERATURE: 97.1 F | HEIGHT: 65 IN | DIASTOLIC BLOOD PRESSURE: 80 MMHG | SYSTOLIC BLOOD PRESSURE: 110 MMHG | WEIGHT: 212 LBS | BODY MASS INDEX: 35.32 KG/M2

## 2021-10-04 DIAGNOSIS — E55.9 VITAMIN D DEFICIENCY: ICD-10-CM

## 2021-10-04 DIAGNOSIS — Z71.3 ENCOUNTER FOR WEIGHT LOSS COUNSELING: ICD-10-CM

## 2021-10-04 DIAGNOSIS — R73.03 PREDIABETES: Primary | ICD-10-CM

## 2021-10-04 DIAGNOSIS — F43.23 ADJUSTMENT DISORDER WITH MIXED ANXIETY AND DEPRESSED MOOD: ICD-10-CM

## 2021-10-04 DIAGNOSIS — E66.09 CLASS 1 OBESITY DUE TO EXCESS CALORIES WITHOUT SERIOUS COMORBIDITY WITH BODY MASS INDEX (BMI) OF 33.0 TO 33.9 IN ADULT: ICD-10-CM

## 2021-10-04 PROCEDURE — 99214 OFFICE O/P EST MOD 30 MIN: CPT | Performed by: FAMILY MEDICINE

## 2021-10-04 PROCEDURE — G8427 DOCREV CUR MEDS BY ELIG CLIN: HCPCS | Performed by: FAMILY MEDICINE

## 2021-10-04 PROCEDURE — G8417 CALC BMI ABV UP PARAM F/U: HCPCS | Performed by: FAMILY MEDICINE

## 2021-10-04 PROCEDURE — G8484 FLU IMMUNIZE NO ADMIN: HCPCS | Performed by: FAMILY MEDICINE

## 2021-10-04 PROCEDURE — 4004F PT TOBACCO SCREEN RCVD TLK: CPT | Performed by: FAMILY MEDICINE

## 2021-10-04 RX ORDER — BUPROPION HYDROCHLORIDE 150 MG/1
150 TABLET ORAL 2 TIMES DAILY
Qty: 180 TABLET | Refills: 1 | Status: SHIPPED | OUTPATIENT
Start: 2021-10-04 | End: 2022-09-08

## 2021-10-04 RX ORDER — METFORMIN HYDROCHLORIDE 500 MG/1
500 TABLET, EXTENDED RELEASE ORAL
Qty: 90 TABLET | Refills: 1 | Status: SHIPPED | OUTPATIENT
Start: 2021-10-04 | End: 2022-09-08

## 2021-10-04 ASSESSMENT — PATIENT HEALTH QUESTIONNAIRE - PHQ9
SUM OF ALL RESPONSES TO PHQ QUESTIONS 1-9: 1
2. FEELING DOWN, DEPRESSED OR HOPELESS: 1
SUM OF ALL RESPONSES TO PHQ QUESTIONS 1-9: 1
1. LITTLE INTEREST OR PLEASURE IN DOING THINGS: 0
9. THOUGHTS THAT YOU WOULD BE BETTER OFF DEAD, OR OF HURTING YOURSELF: 0
SUM OF ALL RESPONSES TO PHQ QUESTIONS 1-9: 1
SUM OF ALL RESPONSES TO PHQ9 QUESTIONS 1 & 2: 1

## 2021-10-04 ASSESSMENT — ANXIETY QUESTIONNAIRES
4. TROUBLE RELAXING: 0-NOT AT ALL
1. FEELING NERVOUS, ANXIOUS, OR ON EDGE: 1-SEVERAL DAYS
6. BECOMING EASILY ANNOYED OR IRRITABLE: 1-SEVERAL DAYS
5. BEING SO RESTLESS THAT IT IS HARD TO SIT STILL: 0-NOT AT ALL
7. FEELING AFRAID AS IF SOMETHING AWFUL MIGHT HAPPEN: 1-SEVERAL DAYS
2. NOT BEING ABLE TO STOP OR CONTROL WORRYING: 1-SEVERAL DAYS
GAD7 TOTAL SCORE: 4
3. WORRYING TOO MUCH ABOUT DIFFERENT THINGS: 0-NOT AT ALL

## 2021-10-04 NOTE — PATIENT INSTRUCTIONS
how to safely stop using bupropion. If you take Zyban to help you stop smoking, you may continue to smoke for about 1 week after you start the medicine. Set a date to quit smoking during the first 2 weeks of treatment. Talk to your doctor if you have trouble quitting after taking Zyban for 7 to 12 weeks. Your doctor may prescribe a nicotine replacement product (such as patches or gum) to help you stop smoking. Start using the nicotine replacement product on the same day you stop (quit) smoking or using tobacco products. Some people taking bupropion (Wellbutrin or Zyban) have had high blood pressure that is severe, especially when also using a nicotine replacement product (patch or gum). Your blood pressure may need to be checked before and during treatment with bupropion. Read and carefully follow any Instructions for Use provided with your medicine. Ask your doctor or pharmacist if you do not understand these instructions. You may have nicotine withdrawal symptoms when you stop smoking, including: increased appetite, weight gain, trouble sleeping, trouble concentrating, slower heart rate, having the urge to smoke, and feeling anxious, restless, depressed, angry, frustrated, or irritated. These symptoms may occur with or without using medication such as Zyban. Smoking cessation may also cause new or worsening mental health problems, such as depression. This medicine may affect a drug-screening urine test and you may have false results. Tell the laboratory staff that you use bupropion. Store at room temperature away from moisture, heat, and light. What happens if I miss a dose? Skip the missed dose and use your next dose at the regular time. Do not use two doses at one time. What happens if I overdose? Seek emergency medical attention or call the Poison Help line at 1-759.886.2919.  An overdose of bupropion can be fatal.  Overdose symptoms may include muscle stiffness, hallucinations, fast or uneven heartbeat, shallow breathing, or fainting. What should I avoid while taking bupropion? Drinking alcohol with bupropion may increase your risk of seizures. If you drink alcohol regularly, talk with your doctor before changing the amount you drink. Bupropion can also cause seizures in a regular drinker who suddenly stops drinking at the start of treatment with bupropion. Avoid driving or hazardous activity until you know how this medicine will affect you. Your reactions could be impaired. What are the possible side effects of bupropion? Get emergency medical help if you have signs of an allergic reaction (hives, itching, fever, swollen glands, difficult breathing, swelling in your face or throat) or a severe skin reaction (fever, sore throat, burning eyes, skin pain, red or purple skin rash with blistering and peeling). Report any new or worsening symptoms to your doctor, such as: mood or behavior changes, anxiety, depression, panic attacks, trouble sleeping, or if you feel impulsive, irritable, agitated, hostile, aggressive, restless, hyperactive (mentally or physically), more depressed, or have thoughts about suicide or hurting yourself. Call your doctor at once if you have:  · a seizure (convulsions);  · confusion, unusual changes in mood or behavior;  · blurred vision, tunnel vision, eye pain or swelling, or seeing halos around lights;  · fast or irregular heartbeats; or  · a manic episode --racing thoughts, increased energy, reckless behavior, feeling extremely happy or irritable, talking more than usual, severe problems with sleep.   Common side effects may include:  · dry mouth, sore throat, stuffy nose;  · ringing in the ears;  · blurred vision;  · nausea, vomiting, stomach pain, loss of appetite, constipation;  · sleep problems (insomnia);  · tremors, sweating, feeling anxious or nervous;  · fast heartbeats;  · confusion, agitation, hostility;  · rash;  · weight loss;  · increased urination;  · headache, dizziness; or  · muscle or joint pain. This is not a complete list of side effects and others may occur. Call your doctor for medical advice about side effects. You may report side effects to FDA at 7-356-SNJ-1644. What other drugs will affect bupropion? You may have a higher risk of seizures if you use certain other medicines while taking bupropion. Many drugs can affect bupropion. This includes prescription and over-the-counter medicines, vitamins, and herbal products. Not all possible interactions are listed here. Tell your doctor about all your current medicines and any medicine you start or stop using. Where can I get more information? Your pharmacist can provide more information about bupropion. Remember, keep this and all other medicines out of the reach of children, never share your medicines with others, and use this medication only for the indication prescribed. Every effort has been made to ensure that the information provided by [address] is accurate, up-to-date, and complete, but no guarantee is made to that effect. Drug information contained herein may be time sensitive. [employer] information has been compiled for use by healthcare practitioners and consumers in the [place] and therefore [employer] does not warrant that uses outside of the [place] are appropriate, unless specifically indicated otherwise. [employer] drug information does not endorse drugs, diagnose patients or recommend therapy. [employer] drug information is an informational resource designed to assist licensed healthcare practitioners in caring for their patients and/or to serve consumers viewing this service as a supplement to, and not a substitute for, the expertise, skill, knowledge and judgment of healthcare practitioners.  The absence of a warning for a given drug or drug combination in no way should be construed to indicate that the drug or drug combination is safe, effective or appropriate for any given patient. [facility] does not assume any responsibility for any aspect of healthcare administered with the aid of information [facility] provides. The information contained herein is not intended to cover all possible uses, directions, precautions, warnings, drug interactions, allergic reactions, or adverse effects. If you have questions about the drugs you are taking, check with your doctor, nurse or pharmacist.  Copyright 3305-0981 [address]: 24.01. Revision date: 1/27/2020. Care instructions adapted under license by [facility] ([facility]). If you have questions about a medical condition or this instruction, always ask your healthcare professional. [place] [address] any warranty or liability for your use of this information.

## 2022-04-06 DIAGNOSIS — I10 ESSENTIAL HYPERTENSION: ICD-10-CM

## 2022-04-06 RX ORDER — LISINOPRIL 10 MG/1
20 TABLET ORAL DAILY
Qty: 60 TABLET | Refills: 1 | OUTPATIENT
Start: 2022-04-06

## 2022-05-30 DIAGNOSIS — F43.23 ADJUSTMENT DISORDER WITH MIXED ANXIETY AND DEPRESSED MOOD: ICD-10-CM

## 2022-05-30 DIAGNOSIS — R73.03 PREDIABETES: ICD-10-CM

## 2022-05-30 DIAGNOSIS — E66.09 CLASS 1 OBESITY DUE TO EXCESS CALORIES WITHOUT SERIOUS COMORBIDITY WITH BODY MASS INDEX (BMI) OF 33.0 TO 33.9 IN ADULT: ICD-10-CM

## 2022-05-31 RX ORDER — BUPROPION HYDROCHLORIDE 150 MG/1
TABLET ORAL
Qty: 180 TABLET | Refills: 1 | OUTPATIENT
Start: 2022-05-31

## 2022-05-31 RX ORDER — METFORMIN HYDROCHLORIDE 500 MG/1
TABLET, EXTENDED RELEASE ORAL
Qty: 90 TABLET | Refills: 1 | OUTPATIENT
Start: 2022-05-31

## 2022-06-14 DIAGNOSIS — I10 ESSENTIAL HYPERTENSION: ICD-10-CM

## 2022-06-14 RX ORDER — LISINOPRIL 10 MG/1
20 TABLET ORAL DAILY
Qty: 60 TABLET | Refills: 1 | OUTPATIENT
Start: 2022-06-14

## 2022-08-31 DIAGNOSIS — I10 ESSENTIAL HYPERTENSION: ICD-10-CM

## 2022-09-01 RX ORDER — LISINOPRIL 10 MG/1
20 TABLET ORAL DAILY
Qty: 60 TABLET | Refills: 1 | OUTPATIENT
Start: 2022-09-01

## 2022-09-08 ENCOUNTER — OFFICE VISIT (OUTPATIENT)
Dept: FAMILY MEDICINE CLINIC | Age: 37
End: 2022-09-08
Payer: COMMERCIAL

## 2022-09-08 DIAGNOSIS — E66.01 SEVERE OBESITY (BMI 35.0-39.9) WITH COMORBIDITY (HCC): ICD-10-CM

## 2022-09-08 DIAGNOSIS — R73.03 PREDIABETES: ICD-10-CM

## 2022-09-08 DIAGNOSIS — I10 ESSENTIAL HYPERTENSION: ICD-10-CM

## 2022-09-08 DIAGNOSIS — F33.0 MILD EPISODE OF RECURRENT MAJOR DEPRESSIVE DISORDER (HCC): ICD-10-CM

## 2022-09-08 DIAGNOSIS — I10 ESSENTIAL HYPERTENSION: Primary | ICD-10-CM

## 2022-09-08 DIAGNOSIS — E55.9 VITAMIN D DEFICIENCY: ICD-10-CM

## 2022-09-08 DIAGNOSIS — E78.2 MIXED HYPERLIPIDEMIA: ICD-10-CM

## 2022-09-08 PROCEDURE — G8427 DOCREV CUR MEDS BY ELIG CLIN: HCPCS | Performed by: FAMILY MEDICINE

## 2022-09-08 PROCEDURE — 99214 OFFICE O/P EST MOD 30 MIN: CPT | Performed by: FAMILY MEDICINE

## 2022-09-08 RX ORDER — LISINOPRIL 10 MG/1
20 TABLET ORAL DAILY
Qty: 60 TABLET | Refills: 1 | OUTPATIENT
Start: 2022-09-08

## 2022-09-08 RX ORDER — PRAVASTATIN SODIUM 20 MG
20 TABLET ORAL DAILY
Qty: 90 TABLET | Refills: 0 | Status: SHIPPED | OUTPATIENT
Start: 2022-09-08

## 2022-09-08 RX ORDER — LISINOPRIL 20 MG/1
20 TABLET ORAL 2 TIMES DAILY
Qty: 180 TABLET | Refills: 0 | Status: SHIPPED | OUTPATIENT
Start: 2022-09-08

## 2022-09-08 SDOH — ECONOMIC STABILITY: FOOD INSECURITY: WITHIN THE PAST 12 MONTHS, THE FOOD YOU BOUGHT JUST DIDN'T LAST AND YOU DIDN'T HAVE MONEY TO GET MORE.: NEVER TRUE

## 2022-09-08 SDOH — ECONOMIC STABILITY: FOOD INSECURITY: WITHIN THE PAST 12 MONTHS, YOU WORRIED THAT YOUR FOOD WOULD RUN OUT BEFORE YOU GOT MONEY TO BUY MORE.: NEVER TRUE

## 2022-09-08 ASSESSMENT — ENCOUNTER SYMPTOMS
ABDOMINAL PAIN: 0
CONSTIPATION: 0
WHEEZING: 0
COUGH: 0
SHORTNESS OF BREATH: 0
NAUSEA: 0
ABDOMINAL DISTENTION: 0
CHEST TIGHTNESS: 0
VOMITING: 0
DIARRHEA: 0

## 2022-09-08 ASSESSMENT — PATIENT HEALTH QUESTIONNAIRE - PHQ9
3. TROUBLE FALLING OR STAYING ASLEEP: 0
9. THOUGHTS THAT YOU WOULD BE BETTER OFF DEAD, OR OF HURTING YOURSELF: 0
SUM OF ALL RESPONSES TO PHQ QUESTIONS 1-9: 3
10. IF YOU CHECKED OFF ANY PROBLEMS, HOW DIFFICULT HAVE THESE PROBLEMS MADE IT FOR YOU TO DO YOUR WORK, TAKE CARE OF THINGS AT HOME, OR GET ALONG WITH OTHER PEOPLE: 1
SUM OF ALL RESPONSES TO PHQ QUESTIONS 1-9: 3
8. MOVING OR SPEAKING SO SLOWLY THAT OTHER PEOPLE COULD HAVE NOTICED. OR THE OPPOSITE, BEING SO FIGETY OR RESTLESS THAT YOU HAVE BEEN MOVING AROUND A LOT MORE THAN USUAL: 0
SUM OF ALL RESPONSES TO PHQ QUESTIONS 1-9: 3
2. FEELING DOWN, DEPRESSED OR HOPELESS: 1
6. FEELING BAD ABOUT YOURSELF - OR THAT YOU ARE A FAILURE OR HAVE LET YOURSELF OR YOUR FAMILY DOWN: 0
1. LITTLE INTEREST OR PLEASURE IN DOING THINGS: 0
SUM OF ALL RESPONSES TO PHQ9 QUESTIONS 1 & 2: 1
7. TROUBLE CONCENTRATING ON THINGS, SUCH AS READING THE NEWSPAPER OR WATCHING TELEVISION: 1
5. POOR APPETITE OR OVEREATING: 1
4. FEELING TIRED OR HAVING LITTLE ENERGY: 0
SUM OF ALL RESPONSES TO PHQ QUESTIONS 1-9: 3

## 2022-09-08 ASSESSMENT — SOCIAL DETERMINANTS OF HEALTH (SDOH): HOW HARD IS IT FOR YOU TO PAY FOR THE VERY BASICS LIKE FOOD, HOUSING, MEDICAL CARE, AND HEATING?: NOT HARD AT ALL

## 2022-09-08 NOTE — PROGRESS NOTES
She does    2022    TELEHEALTH EVALUATION -- Audio/Visual (During ICUBM-84 public health emergency)      Shweta Mckeon (:  1985) is a 40 y.o. female,Established patient, here for evaluation of the following chief complaint(s): Hypertension, Hyperglycemia, and Hyperlipidemia        ASSESSMENT/PLAN:    1. Essential hypertension  Uncontrolled based on her readings  Needs nurse visit appointment BP check in 1 week. BP Readings from Last 3 Encounters:   10/04/21 110/80   21 122/60   21 118/72       -    To restart lisinopril (PRINIVIL;ZESTRIL) 20 MG tablet; Take 1 tablet by mouth 2 times daily, Disp-180 tablet, R-0Normal  -     CBC; Future  -     Comprehensive Metabolic Panel; Future  -     TSH; Future  -     Magnesium; Future  -     Uric Acid; Future  Discussed low salt diet and BP and pulse monitoring. 2. Prediabetes  Previously improved, has stopped metformin, has history of gestational diabetes x2  I believe her prediabetes is worsening due to unintended weight gain  -     Hemoglobin A1C; Future    Lab Results   Component Value Date    LABA1C 5.6 2021    LABA1C 5.8 2021    LABA1C 5.8 10/29/2020       3. Mixed hyperlipidemia  Inadequately controlled  To continue pravastatin, check lipid panel  Lab Results   Component Value Date    LDLCHOLESTEROL 155 (H) 2021       -     pravastatin (PRAVACHOL) 20 MG tablet; Take 1 tablet by mouth daily, Disp-90 tablet, R-0Normal  -     Lipid Panel; Future  4. Vitamin D deficiency  Unsure if improving or not. Will recheck level  She is taking vitamin D 1000 units daily, advised to take with food    -     Vitamin D 25 Hydroxy; Future  5. Mild episode of recurrent major depressive disorder (HCC)  Mild, stable  Will continue to monitor. Not on antidepressant, self discontinued Wellbutrin, she says she does not need it anymore    6. Severe obesity (BMI 35.0-39. 9) with comorbidity ([address]  Worsening  BMI 38.27 kg/M2  Patient reports her weight is 230 pounds  Wt Readings from Last 3 Encounters:   10/04/21 212 lb (96.2 kg)   21 200 lb (90.7 kg)   21 219 lb (99.3 kg)     Low carb, low fat diet, increase fruits and vegetables, and exercise 4-5 times a week 30-40 minutes a day, or walk 1-2 hours per day, or wear a pedometer and get at least 10,000 steps per day. Shayla Esposito received counseling on the following healthy behaviors: nutrition, exercise, medication adherence, and weight loss  Reviewed prior labs and health maintenance  Discussed use, benefit, and side effects of prescribed medications. Barriers to medication compliance addressed. Patient given educational materials - see patient instructions  All patient questions answered. Patient voiced understanding. The patient's past medical,surgical, social, and family history as well as her current medications and allergies were reviewed as documented in today's encounter. Medications, labs, diagnostic studies, consultations and follow-up as documented in this encounter. Return in about 3 months (around 2022) for Visit type PHYSICAL, VISION screen, PHQ9. .    Patient needs a nurse visit in 1 week for blood pressure check, blood work to be done in 1 week fasting    No future appointments. SUBJECTIVE/OBJECTIVE:  Jyothi Lazar (:  1985) has requested an audio/video evaluation for the following concern(s):Hypertension, Hyperglycemia, and Hyperlipidemia    Hypertension:    she  is not exercising, but staying very active, and is adherent to low salt diet. Blood pressure is not well controlled at home. Cardiac symptoms none. Patient denies chest pain, chest pressure/discomfort, claudication, dyspnea, exertional chest pressure/discomfort, fatigue, irregular heart beat, lower extremity edema, near-syncope, orthopnea, palpitations, paroxysmal nocturnal dyspnea, syncope, and tachypnea.   Cardiovascular risk factors: dyslipidemia, hypertension, and obesity (BMI >= or sleeping too much: Not at all  Feeling tired or having little energy: Not at all  Poor appetite or overeating: Several days (poor appetite)  Feeling bad about yourself - or that you are a failure or have let yourself or your family down: Not at all  Trouble concentrating on things, such as reading the newspaper or watching television: Several days  Moving or speaking so slowly that other people could have noticed. Or the opposite - being so fidgety or restless that you have been moving around a lot more than usual: Not at all  Thoughts that you would be better off dead, or of hurting yourself in some way: Not at all  If you checked off any problems, how difficult have these problems made it for you to do your work, take care of things at home, or get along with other people?: Somewhat difficult  PHQ-9 Total Score: 3  PHQ-9 Total Score: 3      PHQ Scores 9/8/2022 10/4/2021 7/19/2021 5/18/2021 1/25/2021 9/17/2020 8/15/2019   PHQ2 Score 1 1 0 0 0 0 0   PHQ9 Score 3 1 0 0 0 0 0       Raisa has Vitamin D deficiency. Suzan Alcaraz  is taking Vitamin D supplementation   she does not feel tired anymore  Lab Results   Component Value Date    VITD25 16.7 (L) 04/26/2021         Review of Systems   Constitutional:  Positive for appetite change and unexpected weight change. Negative for activity change, chills, diaphoresis, fatigue and fever. Respiratory:  Negative for cough, chest tightness, shortness of breath and wheezing. Cardiovascular:  Negative for chest pain, palpitations and leg swelling. Gastrointestinal:  Negative for abdominal distention, abdominal pain, constipation, diarrhea, nausea and vomiting. Endocrine: Negative for cold intolerance, heat intolerance, polydipsia, polyphagia and polyuria. Hematological:  Does not bruise/bleed easily. Psychiatric/Behavioral:  Positive for decreased concentration and dysphoric mood. Negative for self-injury, sleep disturbance and suicidal ideas.  The patient is not nervous/anxious. Prior to Visit Medications    Medication Sig Taking? Authorizing Provider   vitamin D (CHOLECALCIFEROL) 25 MCG (1000 UT) TABS tablet Take 1 tablet by mouth daily Yes JUSTIN Urena CNP   pravastatin (PRAVACHOL) 20 MG tablet Take 1 tablet by mouth daily Yes JUSTIN Urena CNP   Prenat w/o N-QI-Xrgwtqk-FA-DHA (ZATEAN-PN DHA) 27-0.6-0.4-300 MG CAPS Take 1 capsule by mouth daily Yes JUSTIN Urena CNP   lisinopril (PRINIVIL;ZESTRIL) 20 MG tablet Take 1 tablet by mouth 2 times daily Yes JUSTIN Urena CNP   acetaminophen (TYLENOL) 500 MG tablet Take 2 tablets by mouth 3 times daily Yes JUSTIN Mcbride CNP   metFORMIN (GLUCOPHAGE-XR) 500 MG extended release tablet Take 1 tablet by mouth daily (with breakfast)  Patient not taking: Reported on 9/8/2022  JUSTIN Urena CNP   buPROPion (WELLBUTRIN XL) 150 MG extended release tablet Take 1 tablet by mouth 2 times daily  Patient not taking: Reported on 9/8/2022  JUSTIN Urena CNP   Blood Pressure Monitor KIT Use as directed.   Patient not taking: No sig reported  JUSTIN Urena CNP   lisinopril (PRINIVIL;ZESTRIL) 10 MG tablet Take 2 tablets by mouth daily  JUSTIN Urena CNP   RA Alcohol Swabs 70 % PADS USE AS DIRECTED four times a day  Patient not taking: Reported on 9/8/2022  Historical Provider, MD       Social History     Tobacco Use    Smoking status: Every Day     Packs/day: 0.25     Years: 17.00     Pack years: 4.25     Types: Cigarettes    Smokeless tobacco: Never    Tobacco comments:     \"3cigs/day\" 3/22/2021   Vaping Use    Vaping Use: Never used   Substance Use Topics    Alcohol use: Not Currently    Drug use: Not Currently     Types: Marijuana [place])     Comment: \"last used in 2012\"  12/17/2020          PHYSICAL EXAMINATION:    Vital Signs: (As obtained by patient/caregiver or practitioner observation)  -vital signs stable and within normal limits except severe obesity per BMI, BMI 38.27 kg/M2  Patient-Reported Vitals 9/8/2022   Patient-Reported Weight 230 lbs   Patient-Reported Height 5 ft 5 in       Wt Readings from Last 3 Encounters:   10/04/21 212 lb (96.2 kg)   09/23/21 200 lb (90.7 kg)   08/31/21 219 lb (99.3 kg)         Intensity of pain is: 0 out of 10      Constitutional: [x] Appears well-developed and well-nourished [x] No apparent distress      [x] Abnormal-obese      Mental status  [x] Alert and awake  [x] Oriented to person/place/time [x]Able to follow commands      Eyes:  EOM    [x]  Normal  [] Abnormal-  Sclera  [x]  Normal  [] Abnormal -         Discharge [x]  None visible  [] Abnormal -    HENT:   [x] Normocephalic, atraumatic. [] Abnormal   [x] Mouth/Throat: Mucous membranes are moist.     External Ears [x] Normal  [] Abnormal-     Neck: [x] No visualized mass     Pulmonary/Chest: [x] Respiratory effort normal.  [x] No visualized signs of difficulty breathing or respiratory distress        [] Abnormal        Musculoskeletal:   [x] Normal gait with no signs of ataxia         [x] Normal range of motion of neck        [] Abnormal-       Neurological:        [x] No Facial Asymmetry (Cranial nerve 7 motor function) (limited exam to video visit)          [x] No gaze palsy        [] Abnormal-            Skin:        [x] No significant exanthematous lesions or discoloration noted on facial skin         [] Abnormal-            Psychiatric:      [x] No Hallucinations       [x]Mood is normal      [x]Behavior is normal      [x]Judgment is normal      [x]Thought content is normal       [] Abnormal-     Other pertinent observable physical exam findings-none    Due to this being a TeleHealth encounter, evaluation of the following organ systems is limited: Vitals/Constitutional/EENT/Resp/CV/GI//MS/Neuro/Skin/Heme-Lymph-Imm. I personally reviewed most recent labs reviewed with the patient and all questions fully answered.    Hyperlipidemia  Vitamin D deficiency  Hyperglycemia and prediabetes  Otherwise labs within normal limits        Lab Results   Component Value Date    WBC 8.6 09/23/2021    HGB 15.0 09/23/2021    HCT 44.5 09/23/2021    MCV 94.8 09/23/2021     09/23/2021       Lab Results   Component Value Date/Time     09/23/2021 10:39 AM    K 4.0 09/23/2021 10:39 AM     09/23/2021 10:39 AM    CO2 24 09/23/2021 10:39 AM    BUN 8 09/23/2021 10:39 AM    CREATININE 0.55 09/23/2021 10:39 AM    GLUCOSE 115 09/23/2021 10:39 AM    GLUCOSE 76 01/11/2012 07:44 PM    CALCIUM 9.4 09/23/2021 10:39 AM        Lab Results   Component Value Date    ALT 22 04/26/2021    AST 26 04/26/2021    ALKPHOS 92 04/26/2021    BILITOT 0.30 04/26/2021       Lab Results   Component Value Date    TSH 1.04 09/23/2021       Lab Results   Component Value Date    CHOL 233 (H) 04/26/2021    CHOL 188 06/23/2012     Lab Results   Component Value Date    TRIG 120 04/26/2021    TRIG 180 (H) 06/23/2012     Lab Results   Component Value Date    HDL 54 04/26/2021    HDL 37 (L) 09/12/2019    HDL 45 06/23/2012     Lab Results   Component Value Date    LDLCHOLESTEROL 155 (H) 04/26/2021    LDLCHOLESTEROL 112 09/12/2019    LDLCHOLESTEROL 107 (H) 06/23/2012       Lab Results   Component Value Date    CHOLHDLRATIO 4.3 04/26/2021    CHOLHDLRATIO 4.5 09/12/2019    CHOLHDLRATIO 4.2 06/23/2012       Lab Results   Component Value Date    LABA1C 5.6 08/31/2021    LABA1C 5.8 04/26/2021    LABA1C 5.8 10/29/2020         No results found for: [id number]    Lab Results   Component Value Date    VITD25 16.7 (L) 04/26/2021       Orders Placed This Encounter   Medications    lisinopril (PRINIVIL;ZESTRIL) 20 MG tablet     Sig: Take 1 tablet by mouth 2 times daily     Dispense:  180 tablet     Refill:  0    pravastatin (PRAVACHOL) 20 MG tablet     Sig: Take 1 tablet by mouth daily     Dispense:  90 tablet     Refill:  0         Orders Placed This Encounter   Procedures    CBC     Standing Status:   Future Standing Expiration Date:   9/8/2023    Comprehensive Metabolic Panel     Standing Status:   Future     Standing Expiration Date:   11/5/2022    Hemoglobin A1C     Standing Status:   Future     Standing Expiration Date:   9/8/2023    Lipid Panel     Standing Status:   Future     Standing Expiration Date:   9/8/2023     Order Specific Question:   Is Patient Fasting?/# of Hours     Answer:   8-10 Hours, water ok to drink    Vitamin D 25 Hydroxy     Standing Status:   Future     Standing Expiration Date:   9/8/2023    TSH     Standing Status:   Future     Standing Expiration Date:   9/8/2023    Magnesium     Standing Status:   Future     Standing Expiration Date:   9/8/2023    Uric Acid     Standing Status:   Future     Standing Expiration Date:   9/8/2023         Medications Discontinued During This Encounter   Medication Reason    lisinopril (PRINIVIL;ZESTRIL) 20 MG tablet REORDER    pravastatin (PRAVACHOL) 20 MG tablet REORDER    metFORMIN (GLUCOPHAGE-XR) 500 MG extended release tablet Patient Choice    buPROPion (WELLBUTRIN XL) 150 MG extended release tablet Patient Choice    RA Alcohol Swabs 70 % PADS Patient Choice              Shweta , was evaluated through a synchronous (real-time) audio-video encounter. The patient (or guardian if applicable) is aware that this is a billable service, which includes applicable co-pays. The virtual visit was conducted with patient's (and/or legal guardian consent). Verbal consent to proceed has been obtained within the past 12 months. The visit was conducted pursuant to the emergency declaration under the [facility], [address] [place] [address] authority and the [employer] General Act. Patient identification was verified  Patient was alone   Provider was located at primary practice location. The patient was located at home, in a state where the provider was licensed to provide care.     On

## 2022-09-08 NOTE — PROGRESS NOTES
Visit Information    Have you changed or started any medications since your last visit including any over-the-counter medicines, vitamins, or herbal medicines? no   Have you stopped taking any of your medications? Is so, why? -  yes - metformin  Are you having any side effects from any of your medications? - no    Have you seen any other physician or provider since your last visit?  no   Have you had any other diagnostic tests since your last visit?  no   Have you been seen in the emergency room and/or had an admission in a hospital since we last saw you?  no   Have you had your routine dental cleaning in the past 6 months?  no     Do you have an active MyChart account? If no, what is the barrier?   Yes    Patient Care Team:  JUSTIN Lewis CNP as PCP - General (Family Medicine)  JUSTIN Lewis CNP as PCP - [facility] Empaneled Provider  Marylin Nixon MD as Obstetrician (Perinatology)    Medical History Review  Past Medical, Family, and Social History reviewed and does contribute to the patient presenting condition    Health Maintenance   Topic Date Due    COVID-19 Vaccine (1) Never done    Hepatitis B vaccine (1 of 3 - Risk 3-dose series) Never done    Pneumococcal 0-64 years Vaccine (2 - PCV) 10/28/2020    Lipids  04/26/2022    Flu vaccine (1) 09/01/2022    A1C test (Diabetic or Prediabetic)  08/31/2022    Depression Monitoring  10/04/2022    DTaP/Tdap/Td vaccine (3 - Td or Tdap) 06/18/2025    Cervical cancer screen  10/27/2025    Hepatitis C screen  Completed    HIV screen  Completed    Hepatitis A vaccine  Aged Out    Hib vaccine  Aged Out    Meningococcal (ACWY) vaccine  Aged Out    Varicella vaccine  Discontinued

## 2022-09-12 ENCOUNTER — TELEPHONE (OUTPATIENT)
Dept: FAMILY MEDICINE CLINIC | Age: 37
End: 2022-09-12

## 2022-09-12 VITALS — HEIGHT: 65 IN | BODY MASS INDEX: 38.32 KG/M2 | WEIGHT: 230 LBS

## 2022-09-12 PROBLEM — O03.9 SAB (SPONTANEOUS ABORTION): Status: RESOLVED | Noted: 2021-03-30 | Resolved: 2022-09-12

## 2022-09-12 PROBLEM — F43.23 ADJUSTMENT DISORDER WITH MIXED ANXIETY AND DEPRESSED MOOD: Status: RESOLVED | Noted: 2021-10-04 | Resolved: 2022-09-12

## 2022-09-12 PROBLEM — O09.292 PREVIOUS GESTATIONAL DIABETES MELLITUS, ANTEPARTUM, SECOND TRIMESTER: Status: RESOLVED | Noted: 2020-10-27 | Resolved: 2022-09-12

## 2022-09-12 PROBLEM — Z86.32 PREVIOUS GESTATIONAL DIABETES MELLITUS, ANTEPARTUM, SECOND TRIMESTER: Status: RESOLVED | Noted: 2020-10-27 | Resolved: 2022-09-12

## 2022-09-12 PROBLEM — Z23 NEED FOR VACCINATION: Status: RESOLVED | Noted: 2020-10-27 | Resolved: 2022-09-12

## 2022-09-12 PROBLEM — E66.01 MORBID OBESITY WITH BMI OF 40.0-44.9, ADULT (HCC): Status: RESOLVED | Noted: 2021-03-04 | Resolved: 2022-09-12

## 2022-09-12 PROBLEM — O24.319 PREGESTATIONAL DIABETES MELLITUS, MODIFIED WHITE CLASS B: Status: RESOLVED | Noted: 2020-10-27 | Resolved: 2022-09-12

## 2022-09-12 PROBLEM — Z64.1 GRAND MULTIPARA: Status: RESOLVED | Noted: 2021-03-30 | Resolved: 2022-09-12

## 2022-09-12 PROBLEM — F32.A DEPRESSION: Status: RESOLVED | Noted: 2021-03-30 | Resolved: 2022-09-12

## 2022-09-12 NOTE — TELEPHONE ENCOUNTER
Return in about 3 months (around 12/8/2022) for Visit type PHYSICAL, VISION screen, PHQ9. .    Patient needs a nurse visit in 1 week for blood pressure check, blood work to be done in 1 week fasting

## 2022-10-20 ENCOUNTER — HOSPITAL ENCOUNTER (EMERGENCY)
Age: 37
Discharge: HOME | End: 2022-10-20
Attending: EMERGENCY MEDICINE
Payer: COMMERCIAL

## 2022-10-20 ENCOUNTER — APPOINTMENT (OUTPATIENT)
Dept: GENERAL RADIOLOGY | Age: 37
End: 2022-10-20
Payer: COMMERCIAL

## 2022-10-20 VITALS
TEMPERATURE: 97.7 F | SYSTOLIC BLOOD PRESSURE: 155 MMHG | RESPIRATION RATE: 14 BRPM | DIASTOLIC BLOOD PRESSURE: 98 MMHG | WEIGHT: 230 LBS | BODY MASS INDEX: 38.27 KG/M2 | OXYGEN SATURATION: 98 % | HEART RATE: 65 BPM

## 2022-10-20 DIAGNOSIS — S63.695A OTHER SPRAIN OF LEFT RING FINGER, INITIAL ENCOUNTER: Primary | ICD-10-CM

## 2022-10-20 PROCEDURE — 6370000000 HC RX 637 (ALT 250 FOR IP): Performed by: PHYSICIAN ASSISTANT

## 2022-10-20 PROCEDURE — 99283 EMERGENCY DEPT VISIT LOW MDM: CPT

## 2022-10-20 PROCEDURE — 73140 X-RAY EXAM OF FINGER(S): CPT

## 2022-10-20 RX ORDER — IBUPROFEN 400 MG/1
400 TABLET ORAL ONCE
Status: DISCONTINUED | OUTPATIENT
Start: 2022-10-20 | End: 2022-10-20

## 2022-10-20 RX ORDER — IBUPROFEN 400 MG/1
400 TABLET ORAL ONCE
Status: COMPLETED | OUTPATIENT
Start: 2022-10-20 | End: 2022-10-20

## 2022-10-20 RX ORDER — IBUPROFEN 600 MG/1
600 TABLET ORAL EVERY 8 HOURS PRN
Qty: 30 TABLET | Refills: 0 | Status: SHIPPED | OUTPATIENT
Start: 2022-10-20

## 2022-10-20 RX ADMIN — IBUPROFEN 400 MG: 400 TABLET ORAL at 17:46

## 2022-10-20 ASSESSMENT — PAIN DESCRIPTION - LOCATION: LOCATION: HAND

## 2022-10-20 ASSESSMENT — PAIN - FUNCTIONAL ASSESSMENT: PAIN_FUNCTIONAL_ASSESSMENT: 0-10

## 2022-10-20 ASSESSMENT — PAIN SCALES - GENERAL
PAINLEVEL_OUTOF10: 9
PAINLEVEL_OUTOF10: 9

## 2022-10-20 ASSESSMENT — PAIN DESCRIPTION - ORIENTATION: ORIENTATION: LEFT

## 2022-10-20 ASSESSMENT — PAIN DESCRIPTION - DESCRIPTORS: DESCRIPTORS: BURNING

## 2022-10-20 NOTE — ED PROVIDER NOTES
[address]  eMERGENCY dEPARTMENT eNCOUnter      Pt Name: Viviane Ramos  MRN: [id number]  [place] 1985  Date of evaluation: 10/20/22      CHIEF COMPLAINT       Chief Complaint   Patient presents with    Finger Injury     L middle finger          HISTORY OF PRESENT ILLNESS    Viviane Ramos is a 40 y.o. female who presents complaining of left finger pain  The history is provided by the patient. Hand Problem  Location:  Finger  Finger location:  L ring finger  Injury: yes    Time since incident:  2 days (1)  Mechanism of injury comment:  She was wearing gloves in her glove got caught in a drill and twisted her left fourth finger this occurred 2 days ago  Pain details:     Quality:  Aching    Severity:  Mild    Onset quality:  Sudden    Duration:  2 days    Timing:  Intermittent  Handedness:  Right-handed  Dislocation: no    Foreign body present:  No foreign bodies  Tetanus status:  Unknown  Prior injury to area:  No  Relieved by:  Nothing  Worsened by:  Nothing  Ineffective treatments:  None tried  Associated symptoms: decreased range of motion and swelling      REVIEW OF SYSTEMS       Review of Systems   Musculoskeletal:  Positive for arthralgias. All other systems reviewed and are negative.     PAST MEDICAL HISTORY     Past Medical History:   Diagnosis Date    Abnormal Pap smear     LSIL    Adjustment disorder with mixed anxiety and depressed mood 10/4/2021    Blood type A+     Bone disease     fibrousdysplasia    Depression 3/30/2021    Gestational diabetes     2 out of 3 preg== 1st-diet control----2nd-insulin/diet    Gestational diabetes mellitus (GDM) in first trimester     Grand multipara 3/30/2021    Hx GDMA1 and 2 10/27/2020    Liver disease     Menarche @12y/o    Morbid obesity with BMI of 40.0-44.9, adult ([facility] [address] 3/4/2021    Parity       --  vag del -- ALVERTO 9/10/2013    Postpartum depression 2011    Dx pp depression, put on Prozac    PreE w/o SF's (G7) 3/31/2021    Pregestational diabetes mellitus, modified White class B 10/27/2020    SAB x1 3/30/2021    Required D&C     3/31/21 F Apg 8/9 Wt 6#14 2953    Umbilical hernia     Venous insufficiency of right leg        SURGICAL HISTORY       Past Surgical History:   Procedure Laterality Date    DILATION AND CURETTAGE      DILATION AND CURETTAGE OF UTERUS      LEG SURGERY Right 10/2012    right leg d/t fibrousdysplaia    UMBILICAL HERNIA REPAIR N/A     HERNIA UMBILICAL REPAIR W/MESH performed by Kirstie Veras MD at [facility]       Previous Medications    ACETAMINOPHEN (TYLENOL) 500 MG TABLET    Take 2 tablets by mouth 3 times daily    BLOOD PRESSURE MONITOR KIT    Use as directed. LISINOPRIL (PRINIVIL;ZESTRIL) 20 MG TABLET    Take 1 tablet by mouth 2 times daily    PRAVASTATIN (PRAVACHOL) 20 MG TABLET    Take 1 tablet by mouth daily    PRENAT W/O N-RD-HJAPBHN-FA-DHA (ZATEAN-PN DHA) 27-0.6-0.4-300 MG CAPS    Take 1 capsule by mouth daily    VITAMIN D (CHOLECALCIFEROL) 25 MCG (1000 UT) TABS TABLET    Take 1 tablet by mouth daily       ALLERGIES     is allergic to morphine. FAMILY HISTORY     She indicated that the status of her mother is unknown. She indicated that the status of her maternal grandmother is unknown. She indicated that the status of her maternal grandfather is unknown. SOCIAL HISTORY      reports that she has been smoking cigarettes. She has a 4.25 pack-year smoking history. She has never used smokeless tobacco. She reports that she does not currently use alcohol. She reports that she does not currently use drugs after having used the following drugs: Marijuana Ericjude Vernonberg). PHYSICAL EXAM     INITIAL VITALS: BP (!) 155/98   Pulse 65   Temp 97.7 °F (36.5 °C) (Oral)   Resp 14   Wt 230 lb (104.3 kg)   SpO2 98%   BMI 38.27 kg/m²      Physical Exam  Vitals and nursing note reviewed. Constitutional:       Appearance: She is well-developed.    HENT:      Head: Normocephalic and atraumatic. Cardiovascular:      Rate and Rhythm: Normal rate and regular rhythm. Heart sounds: Normal heart sounds. Pulmonary:      Effort: Pulmonary effort is normal.      Breath sounds: Normal breath sounds. Musculoskeletal:         General: Normal range of motion. Comments: Left fourth digit palmar aspect there is some small bruising and some mild swelling no obvious deformity brisk capillary refill is noted. Skin:     General: Skin is warm and dry. Capillary Refill: Capillary refill takes less than 2 seconds. Findings: No rash. Neurological:      Mental Status: She is alert and oriented to person, place, and time. MEDICAL DECISION MAKING:     No obvious fracture on x-ray as read by radiology. Will apply a splint recommend ice elevate use Tylenol Motrin for pain follow-up with your doctor for recheck as needed in 2 to 3 days if symptoms worsen or any other concerns please return the emergency department    DIAGNOSTIC RESULTS     EKG: All EKG's are interpreted by the Emergency Department Physician who either signs or Co-signs this chart in the absence of acardiologist.        RADIOLOGY:Allplain film, CT, MRI, and formal ultrasound images (except ED bedside ultrasound) are read by the radiologist and the images and interpretations are directly viewed by the emergency physician. LABS:All lab results were reviewed by myself, and all abnormals are listed below.   Labs Reviewed - No data to display      EMERGENCY DEPARTMENT COURSE:   Vitals:    Vitals:    10/20/22 1725 10/20/22 1726   BP:  (!) 155/98   Pulse: 65    Resp: 14    Temp:  97.7 °F (36.5 °C)   TempSrc:  Oral   SpO2: 98%    Weight: 230 lb (104.3 kg)        The patient was given the following medications while in the emergency department:  Orders Placed This Encounter   Medications    ibuprofen (ADVIL;MOTRIN) tablet 400 mg    DISCONTD: ibuprofen (ADVIL;MOTRIN) tablet 400 mg    ibuprofen (ADVIL;MOTRIN) 600 MG tablet     Sig: Take 1 tablet by mouth every 8 hours as needed for Pain     Dispense:  30 tablet     Refill:  0       -------------------------      CRITICAL CARE:     CONSULTS:  None    PROCEDURES:  Procedures    FINAL IMPRESSION      1.  Other sprain of left ring finger, initial encounter          DISPOSITION/PLAN   DISPOSITION Decision To Discharge 10/20/2022 06:51:38 PM      PATIENT REFERREDTO:  Ivet Tovar, [address] [facility] [address]  [address] [facility]  [address] [facility]    Schedule an appointment as soon as possible for a visit in 2 days      [facility] ED  [facility] [address] [phone]    If symptoms worsen    DISCHARGEMEDICATIONS:  New Prescriptions    IBUPROFEN (ADVIL;MOTRIN) 600 MG TABLET    Take 1 tablet by mouth every 8 hours as needed for Pain       (Please note that portions of this note were completed with a voice recognition program.  Efforts were made to edit thedictations but occasionally words are mis-transcribed.)    NEIL Hancock PA-C  10/20/22 7560

## 2022-10-20 NOTE — ED PROVIDER NOTES
Pt Name: Jim Smith  MRN: [id number]  [place] 1985  Date of evaluation: 10/20/22   Jim Smith is a 40 y.o. female with CC: Finger Injury (L middle finger )    MDM:   This visit was performed by both a physician and an APC. I performed all aspects of the MDM as documented. RADIOLOGY:All plain film, CT, MRI, and formal ultrasound images (except ED bedside ultrasound) are read by the radiologist, see reports below, unless otherwise noted in MDM or here. XR FINGER LEFT (MIN 2 VIEWS)   Final Result   No acute abnormality identified.                  PASTMEDICAL HISTORY     Past Medical History:   Diagnosis Date    Abnormal Pap smear     LSIL    Adjustment disorder with mixed anxiety and depressed mood 10/4/2021    Blood type A+     Bone disease     fibrousdysplasia    Depression 3/30/2021    Gestational diabetes     2 out of 3 preg== 1st-diet control----2nd-insulin/diet    Gestational diabetes mellitus (GDM) in first trimester     Grand multipara 3/30/2021    Hx GDMA1 and 2 10/27/2020    Liver disease     Menarche @14y/o    Morbid obesity with BMI of 40.0-44.9, adult ([facility] 3/4/2021    Parity       --  vag del -- ALVERTO 9/10/2013    Postpartum depression 2011    Dx pp depression, put on Prozac    PreE w/o SF's ([address] [place] [address]) 3/31/2021    Pregestational diabetes mellitus, modified White class B 10/27/2020    SAB x1 3/30/2021    Required D&C     3/31/21 F Apg  Wt 6#14     Umbilical hernia     Venous insufficiency of right leg      SURGICAL HISTORY       Past Surgical History:   Procedure Laterality Date    DILATION AND CURETTAGE      DILATION AND CURETTAGE OF UTERUS      LEG SURGERY Right 10/2012    right leg d/t fibrousdysplaia    UMBILICAL HERNIA REPAIR N/A     HERNIA UMBILICAL REPAIR W/MESH performed by Taylor Bolivar MD at [address] [place] [address]       Previous Medications    ACETAMINOPHEN (TYLENOL) 500 MG TABLET    Take 2 tablets by mouth 3 times daily BLOOD PRESSURE MONITOR KIT    Use as directed. LISINOPRIL (PRINIVIL;ZESTRIL) 20 MG TABLET    Take 1 tablet by mouth 2 times daily    PRAVASTATIN (PRAVACHOL) 20 MG TABLET    Take 1 tablet by mouth daily    PRENAT W/O Y-ZM-ATMCLOU-FA-DHA (ZATEAN-PN DHA) 27-0.6-0.4-300 MG CAPS    Take 1 capsule by mouth daily    VITAMIN D (CHOLECALCIFEROL) 25 MCG (1000 UT) TABS TABLET    Take 1 tablet by mouth daily     ALLERGIES     is allergic to morphine. FAMILY HISTORY     She indicated that the status of her mother is unknown. She indicated that the status of her maternal grandmother is unknown. She indicated that the status of her maternal grandfather is unknown. SOCIAL HISTORY       Social History     Tobacco Use    Smoking status: Every Day     Packs/day: 0.25     Years: 17.00     Pack years: 4.25     Types: Cigarettes    Smokeless tobacco: Never    Tobacco comments:     \"3cigs/day\" 3/22/2021   Vaping Use    Vaping Use: Never used   Substance Use Topics    Alcohol use: Not Currently    Drug use: Not Currently     Types: Marijuana Stevie Kim)     Comment: \"last used in 2012\"  12/17/2020          Christin Viramontes MD  The care is provided during an unprecedented national emergency due to the novel coronavirus, COVID 19.   Attending Emergency Physician       Christin Viramontes MD  10/20/22 7475

## 2022-11-28 ENCOUNTER — TELEPHONE (OUTPATIENT)
Dept: FAMILY MEDICINE CLINIC | Age: 37
End: 2022-11-28

## 2022-11-28 NOTE — TELEPHONE ENCOUNTER
LVM for pt advising appt on 12/5/22 needs to be rescheduled due to provider being out of the office. Writer also adv if she is experiencing any symptoms that need immediate attention to please submit an E-visit through My Chart. Sending message through My Chart as well.

## 2023-03-16 ENCOUNTER — HOSPITAL ENCOUNTER (EMERGENCY)
Age: 38
Discharge: HOME | End: 2023-03-16
Attending: EMERGENCY MEDICINE
Payer: COMMERCIAL

## 2023-03-16 VITALS
OXYGEN SATURATION: 97 % | HEIGHT: 65 IN | HEART RATE: 83 BPM | WEIGHT: 200 LBS | DIASTOLIC BLOOD PRESSURE: 71 MMHG | BODY MASS INDEX: 33.32 KG/M2 | RESPIRATION RATE: 20 BRPM | SYSTOLIC BLOOD PRESSURE: 127 MMHG | TEMPERATURE: 98.2 F

## 2023-03-16 DIAGNOSIS — H10.9 CONJUNCTIVITIS OF BOTH EYES, UNSPECIFIED CONJUNCTIVITIS TYPE: ICD-10-CM

## 2023-03-16 DIAGNOSIS — K08.89 PAIN, DENTAL: Primary | ICD-10-CM

## 2023-03-16 DIAGNOSIS — J06.9 UPPER RESPIRATORY TRACT INFECTION, UNSPECIFIED TYPE: ICD-10-CM

## 2023-03-16 LAB
FLUAV RNA RESP QL NAA+PROBE: NOT DETECTED
FLUBV RNA RESP QL NAA+PROBE: NOT DETECTED
SARS-COV-2 RNA RESP QL NAA+PROBE: NOT DETECTED
SOURCE: NORMAL
SPECIMEN DESCRIPTION: NORMAL

## 2023-03-16 PROCEDURE — 99283 EMERGENCY DEPT VISIT LOW MDM: CPT

## 2023-03-16 PROCEDURE — 87636 SARSCOV2 & INF A&B AMP PRB: CPT

## 2023-03-16 RX ORDER — PENICILLIN V POTASSIUM 500 MG/1
500 TABLET ORAL 4 TIMES DAILY
Qty: 40 TABLET | Refills: 0 | Status: SHIPPED | OUTPATIENT
Start: 2023-03-16 | End: 2023-03-26

## 2023-03-16 RX ORDER — POLYMYXIN B SULFATE AND TRIMETHOPRIM 1; 10000 MG/ML; [USP'U]/ML
1 SOLUTION OPHTHALMIC EVERY 4 HOURS
Qty: 10 ML | Refills: 0 | Status: SHIPPED | OUTPATIENT
Start: 2023-03-16 | End: 2023-03-23

## 2023-03-16 RX ORDER — BENZONATATE 100 MG/1
100 CAPSULE ORAL 3 TIMES DAILY PRN
Qty: 20 CAPSULE | Refills: 0 | Status: SHIPPED | OUTPATIENT
Start: 2023-03-16 | End: 2023-03-26

## 2023-03-16 ASSESSMENT — ENCOUNTER SYMPTOMS
BACK PAIN: 0
EYE PAIN: 0
SHORTNESS OF BREATH: 0
COUGH: 1
EYE REDNESS: 1
ABDOMINAL PAIN: 0
COLOR CHANGE: 0
EYE DISCHARGE: 1

## 2023-03-16 ASSESSMENT — PAIN - FUNCTIONAL ASSESSMENT: PAIN_FUNCTIONAL_ASSESSMENT: NONE - DENIES PAIN

## 2023-03-16 NOTE — ED TRIAGE NOTES
Mode of arrival (squad #, walk in, police, etc) : Walk in        Chief complaint(s): Cough, eyes are irritated bilaterally with crusting drainage. Arrival Note (brief scenario, treatment PTA, etc). : For past two weeks, patient has had a productive cough with thick green/yellow expectorant. Eyes are irritated bilaterally with patient waking this AM with crusting yellow drainage to eyes, making eye opening difficult. C= \"Have you ever felt that you should Cut down on your drinking? \"  No  A= \"Have people Annoyed you by criticizing your drinking? \"  No  G= \"Have you ever felt bad or Guilty about your drinking? \"  No  E= \"Have you ever had a drink as an Eye-opener first thing in the morning to steady your nerves or to help a hangover? \"  No      Deferred []      Reason for deferring: N/A    *If yes to two or more: probable alcohol abuse. *

## 2023-03-16 NOTE — ED PROVIDER NOTES
EMERGENCY DEPARTMENT ENCOUNTER    Pt Name: Anna Acosta  MRN: [id number]  [place] 1985  Date of evaluation: 3/16/23  CHIEF COMPLAINT       Chief Complaint   Patient presents with    Conjunctivitis     Sclera is red to bilateral eyes. Eyes were crusted shut this AM with yellow crust.     Cough     Thick green/yellow expectorant, feels congestided. Dental Pain     Right lower tooth     HISTORY OF PRESENT ILLNESS   44-year-old female presents for complaints of eye redness, cough and dental pain. Patient reports that she been having nasal congestion for the last week or so and then developed associated cough with nasal congestion. She also reports that in the last couple days she has developed some bilateral eye redness states that they were crusted this morning. She also reports he been having pain in the right lower tooth for the last couple days has a known history of a crown there. She denies any difficulty swallowing or difficulty breathing denies any fevers chest pain or shortness of breath denies any known sick contacts. The history is provided by the patient. REVIEW OF SYSTEMS     Review of Systems   Constitutional:  Negative for fever. HENT:  Positive for congestion and dental problem. Negative for ear pain. Eyes:  Positive for discharge and redness. Negative for pain. Respiratory:  Positive for cough. Negative for shortness of breath. Cardiovascular:  Negative for chest pain, palpitations and leg swelling. Gastrointestinal:  Negative for abdominal pain. Genitourinary:  Negative for dysuria and flank pain. Musculoskeletal:  Negative for back pain. Skin:  Negative for color change. Neurological:  Negative for numbness and headaches. Psychiatric/Behavioral:  Negative for confusion. All other systems reviewed and are negative.   PASTMEDICAL HISTORY     Past Medical History:   Diagnosis Date    Abnormal Pap smear 2008    LSIL    Adjustment disorder with mixed anxiety and depressed mood 10/4/2021    Blood type A+     Bone disease     fibrousdysplasia    Depression 3/30/2021    Gestational diabetes     2 out of 3 preg== 1st-diet control----2nd-insulin/diet    Gestational diabetes mellitus (GDM) in first trimester     Grand multipara 3/30/2021    Hx GDMA1 and 2 10/27/2020    Liver disease     Menarche @14y/o    Morbid obesity with BMI of 40.0-44.9, adult ([facility] [address] 3/4/2021    Parity       --  vag del -- ALVERTO 9/10/2013    Postpartum depression 2011    Dx pp depression, put on Prozac    PreE w/o SF's (G7) 3/31/2021    Pregestational diabetes mellitus, modified White class B 10/27/2020    SAB x1 3/30/2021    Required D&C     3/31/21 F Apg 8/ Wt 6#14     Umbilical hernia     Venous insufficiency of right leg      Past Problem List  Patient Active Problem List   Diagnosis Code    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 9) with comorbidity ([facility] [address] E66.01    Fatty liver disease, nonalcoholic S30.0    Noncompliance Z91.199    Essential hypertension I10    Mixed hyperlipidemia E78.2    Prediabetes R73.03    Mild episode of recurrent major depressive disorder ([facility] [address] F33.0     SURGICAL HISTORY       Past Surgical History:   Procedure Laterality Date    DILATION AND CURETTAGE      DILATION AND CURETTAGE OF UTERUS      LEG SURGERY Right 10/2012    right leg d/t fibrousdysplaia    UMBILICAL HERNIA REPAIR N/A 5064    HERNIA UMBILICAL REPAIR W/MESH performed by Anita Grullon MD at [address] [place] [address]       Discharge Medication List as of 3/16/2023 11:32 AM        CONTINUE these medications which have NOT CHANGED    Details   ibuprofen (ADVIL;MOTRIN) 600 MG tablet Take 1 tablet by mouth every 8 hours as needed for Pain, Disp-30 tablet, R-0Normal      lisinopril (PRINIVIL;ZESTRIL) 20 MG tablet Take 1 tablet by mouth 2 times daily, Disp-180 tablet, R-0Normal      pravastatin (PRAVACHOL) 20 MG tablet Take 1 tablet by mouth daily, Disp-90 tablet, R-0Normal      vitamin D (CHOLECALCIFEROL) 25 MCG (1000 UT) TABS tablet Take 1 tablet by mouth daily, Disp-90 tablet, R-5Normal      Prenat w/o S-LQ-Rjwrwgx-FA-DHA (ZATEAN-PN DHA) 27-0.6-0.4-300 MG CAPS Take 1 capsule by mouth daily, Disp-90 capsule, R-2Normal      Blood Pressure Monitor KIT Disp-1 kit, R-1, PrintUse as directed. acetaminophen (TYLENOL) 500 MG tablet Take 2 tablets by mouth 3 times daily, Disp-540 tablet, R-1Normal           ALLERGIES     is allergic to morphine. FAMILY HISTORY     She indicated that the status of her mother is unknown. She indicated that the status of her maternal grandmother is unknown. She indicated that the status of her maternal grandfather is unknown. SOCIAL HISTORY       Social History     Tobacco Use    Smoking status: Every Day     Packs/day: 0.25     Years: 17.00     Pack years: 4.25     Types: Cigarettes    Smokeless tobacco: Never    Tobacco comments:     \"3cigs/day\" 3/22/2021   Vaping Use    Vaping Use: Never used   Substance Use Topics    Alcohol use: Not Currently    Drug use: Not Currently     Types: Marijuana Aloma Lizandro)     Comment: \"last used in 2012\"  12/17/2020     PHYSICAL EXAM     INITIAL VITALS: /71   Pulse 83   Temp 98.2 °F (36.8 °C) (Oral)   Resp 20   Ht 5' 5\" (1.651 m)   Wt 200 lb (90.7 kg)   LMP 03/01/2023   SpO2 97%   BMI 33.28 kg/m²    Physical Exam  Vitals and nursing note reviewed. Constitutional:       General: She is not in acute distress. Appearance: Normal appearance. She is not toxic-appearing. HENT:      Head: Normocephalic and atraumatic. Jaw: There is normal jaw occlusion. No trismus. Nose: Congestion present.       Mouth/Throat:      Mouth: Mucous membranes are moist. Dentition: Dental tenderness and dental caries present. No dental abscesses. Pharynx: Oropharynx is clear. Uvula midline. No pharyngeal swelling. Tonsils: No tonsillar exudate. Comments: No facial swelling, mouth is soft, no tongue elevation, no swelling or erythema noted under the chin  Eyes:      Extraocular Movements: Extraocular movements intact. Conjunctiva/sclera:      Right eye: Right conjunctiva is injected. Left eye: Left conjunctiva is injected. Pupils: Pupils are equal, round, and reactive to light. Cardiovascular:      Rate and Rhythm: Normal rate and regular rhythm. Pulses: Normal pulses. Heart sounds: Normal heart sounds. Pulmonary:      Effort: Pulmonary effort is normal.      Breath sounds: Normal breath sounds. Abdominal:      General: Bowel sounds are normal. There is no distension. Palpations: Abdomen is soft. Tenderness: There is no abdominal tenderness. Musculoskeletal:         General: Normal range of motion. Cervical back: Normal range of motion. No spinous process tenderness or muscular tenderness. Skin:     General: Skin is warm and dry. Capillary Refill: Capillary refill takes less than 2 seconds. Neurological:      General: No focal deficit present. Mental Status: She is alert and oriented to person, place, and time. Cranial Nerves: Cranial nerves 2-12 are intact. Sensory: Sensation is intact. Motor: Motor function is intact. Psychiatric:         Mood and Affect: Mood normal.         Thought Content: Thought content does not include homicidal or suicidal ideation. MEDICAL DECISION MAKING / ED COURSE:   30-year-old female presents for complaints of eye redness, dental pain and cough.   On initial exam patient in no acute distress, vitals are stable does have some injection of bilateral sclera, tenderness on the right posterior molars, no evidence of Ludwigs angina, lungs are clear      1)  Number and Complexity of Problems Addressed at this Encounter  Problem List This Visit: Cough, eye redness, dental pain    Differential Diagnosis: Viral illness, conjunctivitis, dental infection    Diagnoses Considered but Do Not Suspect: Pneumonia    Pertinent Comorbid Conditions: None      3)  Treatment and Disposition       Patient noted to have injection of bilateral sclera lungs are clear, dental pain in the right posterior molar, suspect likely viral illness we will check COVID and flu testing    COVID and flu testing is negative    Suspect likely viral illness, also suspect likely conjunctivitis and dental infection    We will start antibiotics for dental infection, will start on antibiotic drops for conjunctivitis    Results were discussed with patient is likely viral illness discussed with her and likely infection assessment for PCP, dentist and return precautions, patient voiced understanding of the plan and discharge    Patient/Guardian was informed of their diagnosis and told to follow up with PCP & dentist in 1-3 days. Patient demonstrates understanding and agreement with the plan. They were given the opportunity to ask questions and those questions were answered to the best of our ability with the available information. Patient/Guardian told to return to the ED for any new, worsening, changing or persistent symptoms. This dictation was prepared using [employer] CK voice recognition software. MIPS:  MIPS Measure #65:  Appropriate Treatment for Patients with URI    [] The patient was diagnosed with upper respiratory infection and was not prescribed or dispensed an antibiotic. [SATISFIES MIPS]    [x] MIPS EXCLUSIONS:  [ ] The patient has comorbid condition (within last 12 months) [] (e.g., neutropenia, cystic fibrosis, chronic bronchitis, pulmonary edema, respiratory failure, rheumatoid lung disease)   [ ] The patient is already on Abx,  or has taken them in the last 30 days.    [ X] The patient had a competing diagnosis of [toothache/dental infection] (e.g. acute otitis media, chronic sinusitis, cellulitis, UTI, etc)     [] The patient was diagnosed with upper respiratory infection and was prescribed or dispensed an antibiotic. [DOES NOT SATISFY MIPS]       CRITICAL CARE:       PROCEDURES:    Procedures      DATA FOR LAB AND RADIOLOGY TESTS ORDERED BELOW ARE REVIEWED BY THE ED CLINICIAN:    RADIOLOGY: All x-rays, CT, MRI, and formal ultrasound images (except ED bedside ultrasound) are read by the radiologist, see reports below, unless otherwise noted in MDM or here. Reports below are reviewed by myself. No orders to display       LABS: Lab orders shown below, the results are reviewed by myself, and all abnormals are listed below.   Labs Reviewed   COVID-19 & INFLUENZA COMBO       Vitals Reviewed:    Vitals:    03/16/23 0951   BP: 127/71   Pulse: 83   Resp: 20   Temp: 98.2 °F (36.8 °C)   TempSrc: Oral   SpO2: 97%   Weight: 200 lb (90.7 kg)   Height: 5' 5\" (1.651 m)     MEDICATIONS GIVEN TO PATIENT THIS ENCOUNTER:  Orders Placed This Encounter   Medications    penicillin v potassium (VEETID) 500 MG tablet     Sig: Take 1 tablet by mouth 4 times daily for 10 days     Dispense:  40 tablet     Refill:  0    benzonatate (TESSALON) 100 MG capsule     Sig: Take 1 capsule by mouth 3 times daily as needed for Cough     Dispense:  20 capsule     Refill:  0    trimethoprim-polymyxin b (POLYTRIM) 79119-3.1 UNIT/ML-% ophthalmic solution     Sig: Place 1 drop into both eyes every 4 hours for 7 days     Dispense:  10 mL     Refill:  0     DISCHARGE PRESCRIPTIONS:  Discharge Medication List as of 3/16/2023 11:32 AM        START taking these medications    Details   penicillin v potassium (VEETID) 500 MG tablet Take 1 tablet by mouth 4 times daily for 10 days, Disp-40 tablet, R-0Normal      benzonatate (TESSALON) 100 MG capsule Take 1 capsule by mouth 3 times daily as needed for Cough, Disp-20 capsule, R-0Normal      trimethoprim-polymyxin b (POLYTRIM) 06600-4.1 UNIT/ML-% ophthalmic solution Place 1 drop into both eyes every 4 hours for 7 days, Disp-10 mL, R-0Normal           PHYSICIAN CONSULTS ORDERED THIS ENCOUNTER:  None  FINAL IMPRESSION      1. Pain, dental    2. Upper respiratory tract infection, unspecified type    3.  Conjunctivitis of both eyes, unspecified conjunctivitis type          DISPOSITION/PLAN   DISPOSITION Decision To Discharge 03/16/2023 11:28:42 AM      OUTPATIENT FOLLOW UP THE PATIENT:  Kathy Hankins, APRN - SANDRA  [address]  [address] [facility]  [address] [facility] [place]    Schedule an appointment as soon as possible for a visit       [facility] ED  [address]  [address] [place] [address]  [phone]    As needed, If symptoms worsen    Your Dentist    Schedule an appointment as soon as possible for a visit       [facility], [address] [facility],   03/16/23 3975

## 2023-05-09 ENCOUNTER — NURSE TRIAGE (OUTPATIENT)
Dept: OTHER | Facility: CLINIC | Age: 38
End: 2023-05-09

## 2023-05-09 NOTE — TELEPHONE ENCOUNTER
Location of patient: [place]    Received call from [facility]; Patient with [employer] Complaint requesting to establish care with [facility]. Subjective: Caller states \"I am having a cough, thick mucus that is yellow to white and like a glob, it wont come out of my nose or when I cough. I was recently treated in the ER for a tooth infection anf they gave me Penicllin but it did not take away the cough. I am feeling gurgling in my chest and my dtr was in just in the ER today for pneumonia so I am not sure if I have the same thing. \"     Current Symptoms: Chest tightness, cough, thick mucous in nose-yellow/white, wheezing in inhalation/exhalation, SOB on exertion    Onset: 2 months ago; gradual    Associated Symptoms: none    Pain Severity: 2-3/10;  chest tightness; constant    Temperature: Unsure-has not taken    What has been tried: Cough medicine, Antibiotics, went to ER, Tylenol    LMP:  04/2023  Pregnant: No    Recommended disposition: Go to ED/UCC Now (Or to Office with PCP Approval)    Care advice provided, patient verbalizes understanding; denies any other questions or concerns; instructed to call back for any new or worsening symptoms. Patient/caller agrees to proceed to nearest [facility]     Attention Provider: Thank you for allowing me to participate in the care of your patient. The patient was connected to triage in response to information provided to the [facility]. Please do not respond through this encounter as the response is not directed to a shared pool.       Reason for Disposition   Wheezing is present    Protocols used: Cough-ADULT-[place]

## 2023-05-13 ENCOUNTER — APPOINTMENT (OUTPATIENT)
Dept: GENERAL RADIOLOGY | Age: 38
End: 2023-05-13
Payer: COMMERCIAL

## 2023-05-13 ENCOUNTER — HOSPITAL ENCOUNTER (EMERGENCY)
Age: 38
Discharge: HOME | End: 2023-05-13
Attending: STUDENT IN AN ORGANIZED HEALTH CARE EDUCATION/TRAINING PROGRAM
Payer: COMMERCIAL

## 2023-05-13 VITALS
HEIGHT: 65 IN | WEIGHT: 200 LBS | SYSTOLIC BLOOD PRESSURE: 124 MMHG | HEART RATE: 73 BPM | BODY MASS INDEX: 33.32 KG/M2 | RESPIRATION RATE: 18 BRPM | OXYGEN SATURATION: 98 % | DIASTOLIC BLOOD PRESSURE: 79 MMHG | TEMPERATURE: 97.8 F

## 2023-05-13 DIAGNOSIS — R05.1 ACUTE COUGH: Primary | ICD-10-CM

## 2023-05-13 PROCEDURE — 6370000000 HC RX 637 (ALT 250 FOR IP): Performed by: STUDENT IN AN ORGANIZED HEALTH CARE EDUCATION/TRAINING PROGRAM

## 2023-05-13 PROCEDURE — 71046 X-RAY EXAM CHEST 2 VIEWS: CPT

## 2023-05-13 PROCEDURE — 99283 EMERGENCY DEPT VISIT LOW MDM: CPT

## 2023-05-13 RX ORDER — PREDNISONE 20 MG/1
40 TABLET ORAL ONCE
Status: COMPLETED | OUTPATIENT
Start: 2023-05-13 | End: 2023-05-13

## 2023-05-13 RX ORDER — PREDNISONE 20 MG/1
40 TABLET ORAL DAILY
Qty: 4 TABLET | Refills: 0 | Status: SHIPPED | OUTPATIENT
Start: 2023-05-14 | End: 2023-05-16

## 2023-05-13 RX ADMIN — PREDNISONE 40 MG: 20 TABLET ORAL at 16:38

## 2023-05-13 ASSESSMENT — ENCOUNTER SYMPTOMS
VOMITING: 0
ABDOMINAL PAIN: 0
SINUS PRESSURE: 1
SORE THROAT: 1
COUGH: 1
SHORTNESS OF BREATH: 0
NAUSEA: 0
RHINORRHEA: 0

## 2023-05-13 NOTE — ED PROVIDER NOTES
discharged and was instructed to return to the ED for new or worsening symptoms. Any changes to existing medications or new prescriptions were reviewed with patient/patient representative and they expressed understanding of how to correctly take their medications and the possible side effects.     PATIENT REFERRED TO:  JUSTIN Hester - CNP  [address] [facility] [address] [facility] [address] [phone]    Schedule an appointment as soon as possible for a visit       [facility] ED  [place] [address] [phone]  Go to   If symptoms worsen      DISCHARGE MEDICATIONS:  Discharge Medication List as of 5/13/2023  4:33 PM        START taking these medications    Details   predniSONE (DELTASONE) 20 MG tablet Take 2 tablets by mouth daily for 2 days, Disp-4 tablet, R-0Normal             Sindhu Santoyo DO  Emergency Medicine Attending    (Please note that portions of this note were completed with a voice recognition program.  Efforts were made to edit the dictations but occasionally words are mis-transcribed.)          Sindhu Santoyo DO  05/13/23 [id number]

## 2023-05-13 NOTE — DISCHARGE INSTRUCTIONS
Please take the prednisone once a day every day as prescribed  Please follow-up with your family doctor soon as possible  Should you have any severe, worsening, or unrelenting symptoms, please return to the emergency room immediately

## 2023-05-13 NOTE — ED TRIAGE NOTES
Mode of arrival (squad #, walk in, police, etc) : walk-in        Chief complaint(s): SOB, sore throat        Arrival Note (brief scenario, treatment PTA, etc). : Pt reports SOB, chest tightness, and sore throat x2 days after giving her daughter a nebulizer tx.        C= \"Have you ever felt that you should Cut down on your drinking? \"  No  A= \"Have people Annoyed you by criticizing your drinking? \"  No  G= \"Have you ever felt bad or Guilty about your drinking? \"  No  E= \"Have you ever had a drink as an Eye-opener first thing in the morning to steady your nerves or to help a hangover? \"  No      Deferred []      Reason for deferring: N/A    *If yes to two or more: probable alcohol abuse. *

## 2023-06-13 PROBLEM — E78.2 MIXED HYPERLIPIDEMIA: Status: RESOLVED | Noted: 2021-05-17 | Resolved: 2023-06-13

## 2023-06-13 PROBLEM — E66.01 SEVERE OBESITY (BMI 35.0-39.9) WITH COMORBIDITY (HCC): Status: RESOLVED | Noted: 2021-03-04 | Resolved: 2023-06-13

## 2023-06-13 PROBLEM — B37.2 CANDIDAL SKIN INFECTION: Status: RESOLVED | Noted: 2023-06-13 | Resolved: 2023-06-13

## 2023-06-13 PROBLEM — Z91.199 NONCOMPLIANCE: Status: RESOLVED | Noted: 2021-03-30 | Resolved: 2023-06-13

## 2023-06-13 PROBLEM — B37.2 CANDIDAL SKIN INFECTION: Status: ACTIVE | Noted: 2023-06-13

## 2023-09-18 ENCOUNTER — HOSPITAL ENCOUNTER (EMERGENCY)
Age: 38
Discharge: HOME | End: 2023-09-18
Attending: STUDENT IN AN ORGANIZED HEALTH CARE EDUCATION/TRAINING PROGRAM
Payer: COMMERCIAL

## 2023-09-18 VITALS
SYSTOLIC BLOOD PRESSURE: 117 MMHG | DIASTOLIC BLOOD PRESSURE: 70 MMHG | HEART RATE: 73 BPM | HEIGHT: 65 IN | RESPIRATION RATE: 18 BRPM | TEMPERATURE: 98.2 F | BODY MASS INDEX: 29.99 KG/M2 | OXYGEN SATURATION: 100 % | WEIGHT: 180 LBS

## 2023-09-18 DIAGNOSIS — H10.32 ACUTE BACTERIAL CONJUNCTIVITIS OF LEFT EYE: Primary | ICD-10-CM

## 2023-09-18 DIAGNOSIS — J06.9 VIRAL UPPER RESPIRATORY TRACT INFECTION: ICD-10-CM

## 2023-09-18 PROCEDURE — 99283 EMERGENCY DEPT VISIT LOW MDM: CPT

## 2023-09-18 RX ORDER — GUAIFENESIN AND PSEUDOEPHEDRINE HCL 1200; 120 MG/1; MG/1
1 TABLET, EXTENDED RELEASE ORAL 2 TIMES DAILY
Qty: 20 TABLET | Refills: 0 | Status: SHIPPED | OUTPATIENT
Start: 2023-09-18

## 2023-09-18 RX ORDER — ERYTHROMYCIN 5 MG/G
OINTMENT OPHTHALMIC
Qty: 3.5 G | Refills: 0 | Status: SHIPPED | OUTPATIENT
Start: 2023-09-18 | End: 2023-09-28

## 2023-09-18 ASSESSMENT — LIFESTYLE VARIABLES
HOW OFTEN DO YOU HAVE A DRINK CONTAINING ALCOHOL: MONTHLY OR LESS
HOW MANY STANDARD DRINKS CONTAINING ALCOHOL DO YOU HAVE ON A TYPICAL DAY: 1 OR 2

## 2023-09-18 ASSESSMENT — PAIN SCALES - GENERAL: PAINLEVEL_OUTOF10: 6

## 2023-09-18 ASSESSMENT — PAIN DESCRIPTION - DESCRIPTORS: DESCRIPTORS: BURNING

## 2023-09-18 ASSESSMENT — PAIN DESCRIPTION - ORIENTATION: ORIENTATION: LEFT

## 2023-09-18 ASSESSMENT — PAIN DESCRIPTION - LOCATION: LOCATION: EYE

## 2023-09-18 ASSESSMENT — PAIN DESCRIPTION - PAIN TYPE: TYPE: ACUTE PAIN

## 2023-09-18 ASSESSMENT — PAIN - FUNCTIONAL ASSESSMENT: PAIN_FUNCTIONAL_ASSESSMENT: 0-10

## 2024-02-23 ENCOUNTER — TELEMEDICINE (OUTPATIENT)
Dept: PRIMARY CARE CLINIC | Age: 39
End: 2024-02-23
Payer: COMMERCIAL

## 2024-02-23 DIAGNOSIS — R19.8 ALTERED BOWEL FUNCTION: ICD-10-CM

## 2024-02-23 DIAGNOSIS — B37.31 CANDIDIASIS OF FEMALE GENITALIA: Primary | ICD-10-CM

## 2024-02-23 PROCEDURE — G8484 FLU IMMUNIZE NO ADMIN: HCPCS | Performed by: NURSE PRACTITIONER

## 2024-02-23 PROCEDURE — G8427 DOCREV CUR MEDS BY ELIG CLIN: HCPCS | Performed by: NURSE PRACTITIONER

## 2024-02-23 PROCEDURE — G8419 CALC BMI OUT NRM PARAM NOF/U: HCPCS | Performed by: NURSE PRACTITIONER

## 2024-02-23 PROCEDURE — 4004F PT TOBACCO SCREEN RCVD TLK: CPT | Performed by: NURSE PRACTITIONER

## 2024-02-23 PROCEDURE — 99213 OFFICE O/P EST LOW 20 MIN: CPT | Performed by: NURSE PRACTITIONER

## 2024-02-23 RX ORDER — FLUCONAZOLE 150 MG/1
150 TABLET ORAL
Qty: 2 TABLET | Refills: 0 | Status: SHIPPED | OUTPATIENT
Start: 2024-02-23 | End: 2024-02-29

## 2024-02-23 NOTE — PROGRESS NOTES
different. Constipation may occur with pain in the rectum and cramping. The pain may get worse when you try to pass stools. Sometimes there are small amounts of bright red blood on toilet paper or the surface of stools. This is because of enlarged veins near the rectum (hemorrhoids).  A few changes in your diet and lifestyle may help you avoid ongoing constipation. Your doctor may also prescribe medicine to help loosen your stool.  Some medicines can cause constipation. These include pain medicines and antidepressants. Tell your doctor about all the medicines you take. Your doctor may want to make a medicine change to ease your symptoms.  Follow-up care is a key part of your treatment and safety. Be sure to make and go to all appointments, and call your doctor if you are having problems. It's also a good idea to know your test results and keep a list of the medicines you take.  How can you care for yourself at home?  Drink plenty of fluids. If you have kidney, heart, or liver disease and have to limit fluids, talk with your doctor before you increase the amount of fluids you drink.  Include high-fiber foods in your diet each day. These include fruits, vegetables, beans, and whole grains.  Get at least 30 minutes of exercise on most days of the week. Walking is a good choice. You also may want to do other activities, such as running, swimming, cycling, or playing tennis or team sports.  Take a fiber supplement, such as Citrucel or Metamucil, every day. Read and follow all instructions on the label.  Schedule time each day for a bowel movement. A daily routine may help. Take your time having a bowel movement, but don't sit for more than 10 minutes at a time. And don't strain too much.  Support your feet with a small step stool when you sit on the toilet. This helps flex your hips and places your pelvis in a squatting position.  Your doctor may recommend an over-the-counter laxative to relieve your constipation.

## 2024-06-16 ENCOUNTER — HOSPITAL ENCOUNTER (EMERGENCY)
Age: 39
Discharge: HOME | End: 2024-06-16
Attending: EMERGENCY MEDICINE

## 2024-06-16 ENCOUNTER — APPOINTMENT (OUTPATIENT)
Dept: CT IMAGING | Age: 39
End: 2024-06-16

## 2024-06-16 VITALS
HEART RATE: 57 BPM | TEMPERATURE: 98.3 F | RESPIRATION RATE: 19 BRPM | SYSTOLIC BLOOD PRESSURE: 122 MMHG | OXYGEN SATURATION: 98 % | WEIGHT: 230 LBS | DIASTOLIC BLOOD PRESSURE: 78 MMHG | BODY MASS INDEX: 38.27 KG/M2

## 2024-06-16 DIAGNOSIS — M54.42 ACUTE LEFT-SIDED LOW BACK PAIN WITH LEFT-SIDED SCIATICA: Primary | ICD-10-CM

## 2024-06-16 LAB
BACTERIA URNS QL MICRO: ABNORMAL
BILIRUB UR QL STRIP: NEGATIVE
CASTS #/AREA URNS LPF: ABNORMAL /LPF
CLARITY UR: CLEAR
COLOR UR: YELLOW
EPI CELLS #/AREA URNS HPF: ABNORMAL /HPF
GLUCOSE UR STRIP-MCNC: NEGATIVE MG/DL
HCG UR QL: NEGATIVE
HGB UR QL STRIP.AUTO: ABNORMAL
KETONES UR STRIP-MCNC: NEGATIVE MG/DL
LEUKOCYTE ESTERASE UR QL STRIP: NEGATIVE
NITRITE UR QL STRIP: NEGATIVE
PH UR STRIP: 7.5 [PH] (ref 5–8)
PROT UR STRIP-MCNC: NEGATIVE MG/DL
RBC #/AREA URNS HPF: ABNORMAL /HPF
SP GR UR STRIP: 1.02 (ref 1–1.03)
UROBILINOGEN UR STRIP-ACNC: NORMAL EU/DL (ref 0–1)
WBC #/AREA URNS HPF: ABNORMAL /HPF

## 2024-06-16 PROCEDURE — 6370000000 HC RX 637 (ALT 250 FOR IP): Performed by: EMERGENCY MEDICINE

## 2024-06-16 PROCEDURE — 81025 URINE PREGNANCY TEST: CPT

## 2024-06-16 PROCEDURE — 99284 EMERGENCY DEPT VISIT MOD MDM: CPT

## 2024-06-16 PROCEDURE — 72131 CT LUMBAR SPINE W/O DYE: CPT

## 2024-06-16 PROCEDURE — 6360000002 HC RX W HCPCS: Performed by: EMERGENCY MEDICINE

## 2024-06-16 PROCEDURE — 81001 URINALYSIS AUTO W/SCOPE: CPT

## 2024-06-16 PROCEDURE — 96372 THER/PROPH/DIAG INJ SC/IM: CPT

## 2024-06-16 RX ORDER — KETOROLAC TROMETHAMINE 30 MG/ML
30 INJECTION, SOLUTION INTRAMUSCULAR; INTRAVENOUS ONCE
Status: COMPLETED | OUTPATIENT
Start: 2024-06-16 | End: 2024-06-16

## 2024-06-16 RX ORDER — IBUPROFEN 600 MG/1
600 TABLET ORAL 3 TIMES DAILY PRN
Qty: 20 TABLET | Refills: 0 | Status: SHIPPED | OUTPATIENT
Start: 2024-06-16

## 2024-06-16 RX ORDER — LIDOCAINE 4 G/G
1 PATCH TOPICAL ONCE
Status: DISCONTINUED | OUTPATIENT
Start: 2024-06-16 | End: 2024-06-16 | Stop reason: HOSPADM

## 2024-06-16 RX ORDER — ORPHENADRINE CITRATE 30 MG/ML
60 INJECTION INTRAMUSCULAR; INTRAVENOUS ONCE
Status: COMPLETED | OUTPATIENT
Start: 2024-06-16 | End: 2024-06-16

## 2024-06-16 RX ORDER — ORPHENADRINE CITRATE 100 MG/1
100 TABLET, EXTENDED RELEASE ORAL 2 TIMES DAILY PRN
Qty: 20 TABLET | Refills: 0 | Status: SHIPPED | OUTPATIENT
Start: 2024-06-16 | End: 2024-06-26

## 2024-06-16 RX ORDER — LIDOCAINE 4 G/G
1 PATCH TOPICAL DAILY
Qty: 10 PATCH | Refills: 0 | Status: SHIPPED | OUTPATIENT
Start: 2024-06-16 | End: 2024-06-26

## 2024-06-16 RX ADMIN — ORPHENADRINE CITRATE 60 MG: 60 INJECTION INTRAMUSCULAR; INTRAVENOUS at 11:46

## 2024-06-16 RX ADMIN — KETOROLAC TROMETHAMINE 30 MG: 30 INJECTION, SOLUTION INTRAMUSCULAR at 11:47

## 2024-06-16 ASSESSMENT — PAIN DESCRIPTION - ORIENTATION: ORIENTATION: LOWER

## 2024-06-16 ASSESSMENT — PAIN DESCRIPTION - LOCATION: LOCATION: BACK

## 2024-06-16 ASSESSMENT — PAIN SCALES - GENERAL: PAINLEVEL_OUTOF10: 8

## 2024-06-16 ASSESSMENT — PAIN DESCRIPTION - DESCRIPTORS: DESCRIPTORS: SHOOTING

## 2024-06-16 NOTE — ED NOTES
Mode of arrival (squad #, walk in, police, etc) : walk in         Chief complaint(s): back pain        Arrival Note (brief scenario, treatment PTA, etc).:  Pt states back pain x Tuesday pt denies any injury.         C= \"Have you ever felt that you should Cut down on your drinking?\"  No  A= \"Have people Annoyed you by criticizing your drinking?\"  No  G= \"Have you ever felt bad or Guilty about your drinking?\"  No  E= \"Have you ever had a drink as an Eye-opener first thing in the morning to steady your nerves or to help a hangover?\"  No      Deferred []      Reason for deferring: N/A    *If yes to two or more: probable alcohol abuse.*

## 2024-06-16 NOTE — ED PROVIDER NOTES
EMERGENCY DEPARTMENT ENCOUNTER    Pt Name: Raisa Padilla  MRN: [id number]  Birthdate 1985  Date of evaluation: 6/16/24  CHIEF COMPLAINT       Chief Complaint   Patient presents with    Back Pain     HISTORY OF PRESENT ILLNESS   38-year-old female presents for back pain.  Patient reports that she was in the shower yesterday and slipped in the shower, states that she caught herself but states since that time she been having pain in the left side of her back.  She states that she occasionally feels it down her leg, states pain is worse with movements and trying to bend over.  She denies any new numbness tingling or weakness to the extremity denies any bowel or bladder incontinence or saddle paresthesias.  She states that she has been trying to stretch to help with the pain but has not been getting any better.    The history is provided by the patient.           REVIEW OF SYSTEMS     Review of Systems   Constitutional:  Negative for fever.   HENT:  Negative for congestion and ear pain.    Eyes:  Negative for pain.   Respiratory:  Negative for shortness of breath.    Cardiovascular:  Negative for chest pain, palpitations and leg swelling.   Gastrointestinal:  Negative for abdominal pain.   Genitourinary:  Negative for dysuria and flank pain.   Musculoskeletal:  Positive for back pain.   Skin:  Negative for color change.   Neurological:  Negative for numbness and headaches.   Psychiatric/Behavioral:  Negative for confusion.    All other systems reviewed and are negative.    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

## 2025-06-10 NOTE — L&D DELIVERY NOTE
All Patches/Pads removed. Skin Intact. Mother's Information    Labor Events     labor?: No  Rupture type: Spontaneous=SROM  Fluid color: Clear  Fluid odor: None     Mother Delivery Information    Episiotomy: None  Lacerations: None  Repair Suture: None  Surgical or Additional Est. Blood Loss (mL): 0 (View Only): Edit in Flowsheets   Combined Est. Blood Loss (mL): 0        Michele Lemus [[id number]]    Labor Events     labor?: No   steroids?: None  Cervical ripening date/time:     Antibiotics received during labor?: No  Rupture Identifier: Sac 1   Rupture date/time: 3/30/21 13:30:00   Rupture type: Spontaneous=SROM  Fluid color: Clear  Fluid odor: None  Induction: None  Indications for induction: Premature ROM  Augmentation: Oxytocin  Labor complications: None          Labor Event Times    Labor onset date/time: 3/31/21 0715   Dilation complete date/time:  3/31/21 0939 EDT   Start pushing: 3/31/2021 0943   Decision time (emergent ):        Anesthesia    Method: Epidural  Analgesics: FENTANYL 2 MCG/ML ROPIVICAINE 0.15% IN  ML OB EPIDURAL CMPD     Assisted Delivery Details    Forceps attempted?: No  Vacuum extractor attempted?: No     Document Additional Attempt       Document Additional Attempt             Shoulder Dystocia    Shoulder dystocia present?: No  Add Second Maneuver  Add Third Maneuver  Add Fourth Maneuver  Add Fifth Maneuver  Add Sixth Maneuver  Add Seventh Maneuver  Add Eighth Maneuver  Add Ninth Maneuver     [place] Presentation    Presentation: Vertex  Position: Right  _: Occiput  _: Anterior     [place] Information    Head delivery date/time: 3/31/2021 09:43:00   Changing the 's delivery date/time could affect patient care.:    Delivery date/time:  3/31/21 0824   Delivery type: Vaginal, Spontaneous    Details:        Delivery Providers    Delivering clinician: Long Lu DO   Provider Role    Neelam Carroll, DO Resident    Bertha Callahan, RN Delivery Nurse    Bailee Manuel Lanie Mills, RN Registered Nurse      Cord    Vessels: 3 Vessels  Complications: Nuchal  Nuchal intervention: reduced  Nuchal cord description: loose nuchal cord  Cord around: head  Number of loops: 1  Delayed cord clamping?: Yes  Cord clamped date/time: 3/31/2021 0944  Cord blood disposition: Refrigerator  Gases sent?: Yes  Stem cell collection (by provider): No     Placenta    Date/time: 3/31/2021 09:48:00  Removal: Spontaneous  Appearance: Intact  Disposition: Pathology     Delivery Resuscitation    Method: Bulb Suction, Stimulation     Apgars    Living status: Living  Apgars   1 Minute:  5 Minute:  10 Minute 15 Minute 20 Minute   Skin Color: 0  1       Heart Rate: 2  2       Reflex Irritability: 2  2       Muscle Tone: 2  2       Respiratory Effort: 2  2       Total: 8  9               Apgars Assigned By: Case Koroma     Skin to Skin    Skin to skin initiation date/time: 3/31/21 09:44:00   Skin to skin with: Mother  Skin to skin end date/time:         Measurements       Delivery Information    Episiotomy: None  Perineal lacerations: None    Vaginal laceration: No    Cervical laceration: No    Surgical or additional est. blood loss (mL): 0 (View Only): Edit in Flowsheets   Combined est. blood loss (mL): 0  Repair suture: None     Vaginal Delivery Counts    Initial count personnel: DR. LOPEZ  Initial count verified by: BAYLEE MILTON RN   4x4:  Needles:  Instruments:  Lap Pads:  Sponges:    Initial counts:         Final counts:         Final count personnel: DR. LOPEZ  Final count verified by: BAYLEE Mclean RN  Accurate final count?: Yes  Final vaginal sweep completed: Yes     Other Procedures    Procedures: None          Vaginal Delivery Note  Department of Obstetrics and Gynecology  [facility]       Patient: Yemi Gaming   : 1985  MRN: [id number]   Date of delivery: 3/31/21     Pre-operative Diagnosis: Yemi Gaming [id number] at 38w0d  PROM  Pre-gestational DM (diagnosed via early GTT)  S/P hernia repair  Exposure to hepatitis C  Fatty Liver disease   Hx GDMA1 and 2  Tobacco abuse  Grand Multiparity  Noncompliance  Hx of SAB x1 (D&C)  BMI 40.4    Post-operative Diagnosis:  Same as above, living  female infant    Delivering Obstetrician & Assistant(s): Dr. Ana Lilia Goodman DO; Bon Hsu DO, PGY1    Infant Information:   Information for the patient's :  Alyssia Dolan [[id number]]        Information for the patient's :  Alyssia Carters [[id number]]          Apgar scores: 8 at 1 minute and 9 at 5 minutes. Anesthesia:  epidural anesthesia    Application and Delivery:    She was known to be GBS negative. The patient progressed well, received an epidural, became complete and felt the urge to push. After pushing with contractions the fetal head delivered Cephalic, right occiput anterior over an intact perineum, nuchal cord was present and reduced. The anterior, then posterior shoulder delivered easily and atraumatically followed by the rest of the infant. Nose and mouth suctioned with bulb suction, infant was stimulated and dried. Cord was clamped and cut after one minute delayed cord clamping and infant was placed on the maternal abdomen, and attended by RN for evaluation. The delivery of the placenta was spontaneous and appeared intact, whole and that the umbilical cord had three vessels noted. Pitocin was started. The vagina was swept of all clots and debris. The perineum and vagina were evaluated and no laceration was found. Mother and baby tolerated procedure well. Dr. Carrie Connolly was present for entire delivery.     Delivery Summary:  Labor & Delivery Summary  Dilation Complete Date: 21  Dilation Complete Time:     Specimen: placenta sent to pathology and cord gases  Quantitative blood loss:  112ml immediately following delivery  Condition:  Mother and infant stable  Counts: instrument and sponge counts correct  Blood Type and Rh: A POSITIVE    Rubella Immunity Status: immune      Rebecca Camacho DO  Ob/Gyn Resident  3/31/2021, 10:31 AM

## (undated) DEVICE — SKIN AFFIX SURG ADHESIVE 72/CS 0.55ML: Brand: MEDLINE

## (undated) DEVICE — 20 ML SYRINGE LUER-LOCK TIP: Brand: MONOJECT

## (undated) DEVICE — STRAP,POSITIONING,KNEE/BODY,FOAM,4X60": Brand: MEDLINE

## (undated) DEVICE — COVER,MAYO STAND,STERILE: Brand: MEDLINE

## (undated) DEVICE — SUTURE VCRL + SZ 4-0 L27IN ABSRB UD PS-2 3/8 CIR REV CUT VCP426H

## (undated) DEVICE — SUTURE PDS + SZ 0 L36IN ABSRB VLT CT 1 L36MM 1 2 CIR PDP346H

## (undated) DEVICE — SUTURE VCRL + SZ 0 L27IN ABSRB WHT CT-2 1/2 CIR TAPERPOINT VCP270H

## (undated) DEVICE — SOLUTION IV IRRIG POUR BRL 0.9% SODIUM CHL 2F7124

## (undated) DEVICE — 3M™ WARMING BLANKET, UPPER BODY, 10 PER CASE, 42268: Brand: BAIR HUGGER™

## (undated) DEVICE — HYPODERMIC SAFETY NEEDLE: Brand: MAGELLAN

## (undated) DEVICE — SUTURE VCRL + SZ 3-0 L27IN ABSRB UD L26MM SH 1/2 CIR VCP416H

## (undated) DEVICE — ST CHARLES MINOR ABDOMINAL PK: Brand: MEDLINE INDUSTRIES, INC.

## (undated) DEVICE — GAUZE,SPONGE,4"X4",16PLY,XRAY,STRL,LF: Brand: MEDLINE

## (undated) DEVICE — SUTURE VCRL + SZ 4-0 L18IN ABSRB UD L19MM PS-2 3/8 CIR PRIM VCP496H

## (undated) DEVICE — GLOVE ORANGE PI 7   MSG9070

## (undated) DEVICE — BINDER ABD UNISX 9IN 62IN L AND XL UNIV

## (undated) DEVICE — SUTURE MCRYL + SZ 4-0 L27IN ABSRB UD L19MM PS-2 3/8 CIR MCP426H

## (undated) DEVICE — GLOVE SURG SZ 7 L12IN FNGR THK13MIL BRN LTX SYN POLYMER W

## (undated) DEVICE — GOWN,AURORA,NONREINFORCED,LARGE: Brand: MEDLINE

## (undated) DEVICE — SINGLE PORT MANIFOLD: Brand: NEPTUNE 2

## (undated) DEVICE — SHEET, T, LAPAROTOMY, STERILE: Brand: MEDLINE